# Patient Record
Sex: MALE | Race: WHITE | NOT HISPANIC OR LATINO | Employment: OTHER | URBAN - METROPOLITAN AREA
[De-identification: names, ages, dates, MRNs, and addresses within clinical notes are randomized per-mention and may not be internally consistent; named-entity substitution may affect disease eponyms.]

---

## 2017-02-08 ENCOUNTER — OFFICE VISIT (OUTPATIENT)
Dept: FAMILY MEDICINE CLINIC | Facility: CLINIC | Age: 67
End: 2017-02-08

## 2017-02-08 VITALS
BODY MASS INDEX: 29.4 KG/M2 | HEART RATE: 74 BPM | SYSTOLIC BLOOD PRESSURE: 114 MMHG | TEMPERATURE: 98.6 F | DIASTOLIC BLOOD PRESSURE: 68 MMHG | RESPIRATION RATE: 16 BRPM | WEIGHT: 194 LBS | HEIGHT: 68 IN

## 2017-02-08 DIAGNOSIS — R51.9 HEADACHE, UNSPECIFIED HEADACHE TYPE: Primary | ICD-10-CM

## 2017-02-08 DIAGNOSIS — M51.37 DEGENERATION OF INTERVERTEBRAL DISC OF LUMBOSACRAL REGION: ICD-10-CM

## 2017-02-08 PROCEDURE — 99213 OFFICE O/P EST LOW 20 MIN: CPT | Performed by: FAMILY MEDICINE

## 2017-02-08 RX ORDER — HYDROCODONE BITARTRATE AND ACETAMINOPHEN 5; 325 MG/1; MG/1
TABLET ORAL
Qty: 90 TABLET | Refills: 0 | Status: SHIPPED | OUTPATIENT
Start: 2017-02-08 | End: 2017-05-10 | Stop reason: SDUPTHER

## 2017-02-08 NOTE — PROGRESS NOTES
"Subjective   Rob Shah is a 66 y.o. male.     History of Present Illness     Chief Complaint:   Chief Complaint   Patient presents with   • chronic headache     HOSPITAL FOLLOW UP    • Back Pain     MED REFILL - PAIN MED - J[facility]       Rob Shah 66 y.o. male who presents today for Medical Management of the below listed issues and medication refills.  he has a history of   Patient Active Problem List   Diagnosis   • Degeneration of lumbar intervertebral disc   • ED (erectile dysfunction)   • Hypothyroidism, acquired   • Insomnia   • Nodular prostate without urinary obstruction   • Osteoarthritis   • Hyperlipidemia   • Testicular hypofunction   • Bilateral tinnitus   • Allergic rhinitis due to pollen   • Nausea   .  Since the last visit, he has overall felt well until having to go to the Ed in November for headaches. That visit was as follows:         HPI:  HPI Comments: For the past month, pt has had constant, waxing and waning sharp global headache (starts in frontal head and radiates to vertex and occiput) and dizziness (\"feeling off balance\"). Headache is currently rated at 4/10 and is exacerbated by lying down and improves by pushing on neck. For the last 2 days, he has also had nausea and vomiting (1 episode today). He denies focal weakness, numbness, vision changes, vertigo, neck pain, neck stiffness, fevers, chills, cough, sinus congestion, sore throat, chest pain, and trouble breathing. He reports being referred to ED from  for further evaluation. Pt has no other complaints at this time.      12:58 PM- Rechecked pt. He is resting comfortably and is in no acute distress. Headache has improved after toradol and nausea has resolved after zofran. Pt has had no vomiting in ED. D/w pt and family about all pertinent stable results including normal creatinine and nothing acute found on CT head. Informed pt of plan to prescribe fioricet for headaches (advised pt to not take fioricet while taking his " "hydrocodone prescription). Instructed to f/u with PMD next week for recheck. RTER if sx worsen. Pt understands and agrees with plan. All questions addressed.      His HAs have been gone ever since the ED visit.      he has been compliant with   Current Outpatient Prescriptions:   •  HYDROcodone-acetaminophen (NORCO) 5-325 MG per tablet, Take 1 tablet QD, Disp: 90 tablet, Rfl: 0  •  atorvastatin (LIPITOR) 40 MG tablet, Take 1 tablet by mouth daily., Disp: 90 tablet, Rfl: 3  •  butalbital-acetaminophen-caffeine (FIORICET, ESGIC) -40 MG per tablet, Take 1-2 tablets by mouth Every 6 (Six) Hours As Needed for headaches., Disp: 15 tablet, Rfl: 0  •  fluticasone (FLONASE) 50 MCG/ACT nasal spray, 2 sprays into each nostril daily., Disp: 3 each, Rfl: 3  •  levothyroxine (SYNTHROID) 100 MCG tablet, Take 1 tablet by mouth daily., Disp: 90 tablet, Rfl: 3  •  ondansetron ODT (ZOFRAN-ODT) 4 MG disintegrating tablet, Take 1 tablet by mouth every 8 (eight) hours as needed for nausea or vomiting., Disp: 30 tablet, Rfl: 2  •  traZODone (DESYREL) 150 MG tablet, Take 0.5-2 tabs qhs prn sleep (Patient taking differently: Take 150 mg by mouth Every Night. Take 0.5-2 tabs qhs prn sleep), Disp: 60 tablet, Rfl: 5.  he denies medication side effects.    All of the chronic condition(s) listed above are stable w/o issues.    Visit Vitals   • /68   • Pulse 74   • Temp 98.6 °F (37 °C) (Oral)   • Resp 16   • Ht 68\" (172.7 cm)   • Wt 194 lb (88 kg)   • BMI 29.5 kg/m2       Results for orders placed or performed during the hospital encounter of 12/08/16   POCT rapid strep A   Result Value Ref Range    Rapid Strep A Screen Negative Negative, VALID, INVALID, Not Performed    Internal Control Passed Passed    Lot Number [id number]     Expiration Date 10/2018          The following portions of the patient's history were reviewed and updated as appropriate: allergies, current medications, past family history, past medical history, past social " history, past surgical history and problem list.    Review of Systems   Constitutional: Negative for activity change, chills, fatigue and fever.   Respiratory: Negative for cough and chest tightness.    Cardiovascular: Negative for chest pain and palpitations.   Gastrointestinal: Negative for abdominal pain and nausea.   Endocrine: Negative for cold intolerance and polydipsia.   Neurological: Negative for headaches.   Psychiatric/Behavioral: Negative for behavioral problems and dysphoric mood.   All other systems reviewed and are negative.      Objective   Physical Exam   Constitutional: He appears well-developed and well-nourished.   Neck: Neck supple. No thyromegaly present.   Cardiovascular: Normal rate and regular rhythm.    No murmur heard.  Pulmonary/Chest: Effort normal and breath sounds normal.   Abdominal: Bowel sounds are normal.   Psychiatric: He has a normal mood and affect. His behavior is normal.   Nursing note and vitals reviewed.  Hospital records reviewed with pt confirming HPI.      Assessment/Plan   Rob was seen today for chronic headache and back pain.    Diagnoses and all orders for this visit:    Headache, unspecified headache type    Degeneration of intervertebral disc of lumbosacral region  -     HYDROcodone-acetaminophen (NORCO) 5-325 MG per tablet; Take 1 tablet QD      Headaches resolved currently. Will monitor and do further neck w/u (source) if returns.

## 2017-05-04 DIAGNOSIS — E03.9 HYPOTHYROIDISM, ACQUIRED: ICD-10-CM

## 2017-05-04 DIAGNOSIS — E78.5 HYPERLIPIDEMIA, UNSPECIFIED HYPERLIPIDEMIA TYPE: Primary | ICD-10-CM

## 2017-05-04 LAB
ALBUMIN SERPL-MCNC: 4.4 G/DL (ref 3.5–5.2)
ALBUMIN/GLOB SERPL: 1.6 G/DL
ALP SERPL-CCNC: 91 U/L (ref 39–117)
ALT SERPL-CCNC: 29 U/L (ref 1–41)
AST SERPL-CCNC: 29 U/L (ref 1–40)
BASOPHILS # BLD AUTO: 0.03 10*3/MM3 (ref 0–0.2)
BASOPHILS NFR BLD AUTO: 0.5 % (ref 0–1.5)
BILIRUB SERPL-MCNC: 0.3 MG/DL (ref 0.1–1.2)
BUN SERPL-MCNC: 14 MG/DL (ref 8–23)
BUN/CREAT SERPL: 11.4 (ref 7–25)
CALCIUM SERPL-MCNC: 9.9 MG/DL (ref 8.6–10.5)
CHLORIDE SERPL-SCNC: 104 MMOL/L (ref 98–107)
CHOLEST SERPL-MCNC: 165 MG/DL (ref 0–200)
CO2 SERPL-SCNC: 27.8 MMOL/L (ref 22–29)
CREAT SERPL-MCNC: 1.23 MG/DL (ref 0.76–1.27)
EOSINOPHIL # BLD AUTO: 0.18 10*3/MM3 (ref 0–0.7)
EOSINOPHIL NFR BLD AUTO: 3.1 % (ref 0.3–6.2)
ERYTHROCYTE [DISTWIDTH] IN BLOOD BY AUTOMATED COUNT: 13.2 % (ref 11.5–14.5)
GLOBULIN SER CALC-MCNC: 2.8 GM/DL
GLUCOSE SERPL-MCNC: 85 MG/DL (ref 65–99)
HCT VFR BLD AUTO: 43.6 % (ref 40.4–52.2)
HDLC SERPL-MCNC: 62 MG/DL (ref 40–60)
HGB BLD-MCNC: 14.3 G/DL (ref 13.7–17.6)
IMM GRANULOCYTES # BLD: 0 10*3/MM3 (ref 0–0.03)
IMM GRANULOCYTES NFR BLD: 0 % (ref 0–0.5)
LDLC SERPL CALC-MCNC: 78 MG/DL (ref 0–100)
LYMPHOCYTES # BLD AUTO: 1.94 10*3/MM3 (ref 0.9–4.8)
LYMPHOCYTES NFR BLD AUTO: 32.9 % (ref 19.6–45.3)
MCH RBC QN AUTO: 31.2 PG (ref 27–32.7)
MCHC RBC AUTO-ENTMCNC: 32.8 G/DL (ref 32.6–36.4)
MCV RBC AUTO: 95.2 FL (ref 79.8–96.2)
MONOCYTES # BLD AUTO: 0.6 10*3/MM3 (ref 0.2–1.2)
MONOCYTES NFR BLD AUTO: 10.2 % (ref 5–12)
NEUTROPHILS # BLD AUTO: 3.15 10*3/MM3 (ref 1.9–8.1)
NEUTROPHILS NFR BLD AUTO: 53.3 % (ref 42.7–76)
PLATELET # BLD AUTO: 262 10*3/MM3 (ref 140–500)
POTASSIUM SERPL-SCNC: 4.9 MMOL/L (ref 3.5–5.2)
PROT SERPL-MCNC: 7.2 G/DL (ref 6–8.5)
RBC # BLD AUTO: 4.58 10*6/MM3 (ref 4.6–6)
SODIUM SERPL-SCNC: 144 MMOL/L (ref 136–145)
T4 FREE SERPL-MCNC: 1.51 NG/DL (ref 0.93–1.7)
TRIGL SERPL-MCNC: 125 MG/DL (ref 0–150)
TSH SERPL DL<=0.005 MIU/L-ACNC: 4.06 MIU/ML (ref 0.27–4.2)
VLDLC SERPL CALC-MCNC: 25 MG/DL (ref 5–40)
WBC # BLD AUTO: 5.9 10*3/MM3 (ref 4.5–10.7)

## 2017-05-10 ENCOUNTER — OFFICE VISIT (OUTPATIENT)
Dept: FAMILY MEDICINE CLINIC | Facility: CLINIC | Age: 67
End: 2017-05-10

## 2017-05-10 VITALS
BODY MASS INDEX: 29.25 KG/M2 | WEIGHT: 193 LBS | TEMPERATURE: 98.2 F | HEART RATE: 66 BPM | DIASTOLIC BLOOD PRESSURE: 75 MMHG | HEIGHT: 68 IN | RESPIRATION RATE: 16 BRPM | SYSTOLIC BLOOD PRESSURE: 122 MMHG

## 2017-05-10 DIAGNOSIS — J30.1 NON-SEASONAL ALLERGIC RHINITIS DUE TO POLLEN: ICD-10-CM

## 2017-05-10 DIAGNOSIS — E78.2 MIXED HYPERLIPIDEMIA: ICD-10-CM

## 2017-05-10 DIAGNOSIS — F51.01 PRIMARY INSOMNIA: ICD-10-CM

## 2017-05-10 DIAGNOSIS — M51.37 DEGENERATION OF INTERVERTEBRAL DISC OF LUMBOSACRAL REGION: ICD-10-CM

## 2017-05-10 DIAGNOSIS — Z00.00 ANNUAL PHYSICAL EXAM: Primary | ICD-10-CM

## 2017-05-10 DIAGNOSIS — E03.9 HYPOTHYROIDISM, ACQUIRED: ICD-10-CM

## 2017-05-10 PROCEDURE — G0438 PPPS, INITIAL VISIT: HCPCS | Performed by: FAMILY MEDICINE

## 2017-05-10 PROCEDURE — 96160 PT-FOCUSED HLTH RISK ASSMT: CPT | Performed by: FAMILY MEDICINE

## 2017-05-10 PROCEDURE — 99214 OFFICE O/P EST MOD 30 MIN: CPT | Performed by: FAMILY MEDICINE

## 2017-05-10 RX ORDER — ATORVASTATIN CALCIUM 40 MG/1
40 TABLET, FILM COATED ORAL DAILY
Qty: 90 TABLET | Refills: 3 | Status: SHIPPED | OUTPATIENT
Start: 2017-05-10 | End: 2018-04-30 | Stop reason: SDUPTHER

## 2017-05-10 RX ORDER — LEVOTHYROXINE SODIUM 0.1 MG/1
100 TABLET ORAL DAILY
Qty: 90 TABLET | Refills: 3 | Status: SHIPPED | OUTPATIENT
Start: 2017-05-10 | End: 2018-04-30 | Stop reason: SDUPTHER

## 2017-05-10 RX ORDER — HYDROCODONE BITARTRATE AND ACETAMINOPHEN 5; 325 MG/1; MG/1
TABLET ORAL
Qty: 90 TABLET | Refills: 0 | Status: SHIPPED | OUTPATIENT
Start: 2017-05-10 | End: 2017-08-15 | Stop reason: SDUPTHER

## 2017-05-10 RX ORDER — FLUTICASONE PROPIONATE 50 MCG
2 SPRAY, SUSPENSION (ML) NASAL DAILY
Qty: 3 EACH | Refills: 3 | Status: SHIPPED | OUTPATIENT
Start: 2017-05-10 | End: 2018-04-30 | Stop reason: SDUPTHER

## 2017-05-10 RX ORDER — TRAZODONE HYDROCHLORIDE 150 MG/1
TABLET ORAL
Qty: 180 TABLET | Refills: 1 | Status: SHIPPED | OUTPATIENT
Start: 2017-05-10 | End: 2018-05-01

## 2017-08-15 ENCOUNTER — OFFICE VISIT (OUTPATIENT)
Dept: FAMILY MEDICINE CLINIC | Facility: CLINIC | Age: 67
End: 2017-08-15

## 2017-08-15 VITALS
HEIGHT: 68 IN | HEART RATE: 64 BPM | WEIGHT: 198 LBS | SYSTOLIC BLOOD PRESSURE: 123 MMHG | RESPIRATION RATE: 16 BRPM | BODY MASS INDEX: 30.01 KG/M2 | DIASTOLIC BLOOD PRESSURE: 79 MMHG | TEMPERATURE: 98.6 F

## 2017-08-15 DIAGNOSIS — M54.2 CHRONIC NECK PAIN: Primary | ICD-10-CM

## 2017-08-15 DIAGNOSIS — G89.29 CHRONIC NECK PAIN: Primary | ICD-10-CM

## 2017-08-15 DIAGNOSIS — M51.37 DEGENERATION OF INTERVERTEBRAL DISC OF LUMBOSACRAL REGION: ICD-10-CM

## 2017-08-15 PROCEDURE — 99214 OFFICE O/P EST MOD 30 MIN: CPT | Performed by: FAMILY MEDICINE

## 2017-08-15 PROCEDURE — 72050 X-RAY EXAM NECK SPINE 4/5VWS: CPT | Performed by: FAMILY MEDICINE

## 2017-08-15 RX ORDER — DIAZEPAM 5 MG/1
TABLET ORAL
Qty: 2 TABLET | Refills: 0 | Status: SHIPPED | OUTPATIENT
Start: 2017-08-15 | End: 2017-09-12

## 2017-08-15 RX ORDER — HYDROCODONE BITARTRATE AND ACETAMINOPHEN 5; 325 MG/1; MG/1
TABLET ORAL
Qty: 90 TABLET | Refills: 0 | Status: SHIPPED | OUTPATIENT
Start: 2017-08-15 | End: 2018-05-01 | Stop reason: SDUPTHER

## 2017-08-15 NOTE — PROGRESS NOTES
"Subjective   Rob Shah is a 66 y.o. male.     History of Present Illness     Chief Complaint:   Chief Complaint   Patient presents with   • Pain     neck pain and med refill       Rob Shah 66 y.o. male who presents today for Medical Management of the below listed issues and medication refills.  he has a problem list of   Patient Active Problem List   Diagnosis   • Degeneration of lumbar intervertebral disc   • ED (erectile dysfunction)   • Hypothyroidism, acquired   • Insomnia   • Nodular prostate without urinary obstruction   • Osteoarthritis   • Hyperlipidemia   • Testicular hypofunction   • Bilateral tinnitus   • Allergic rhinitis due to pollen   • Nausea   .  Since the last visit, he has had progression of chronic neck pain (even on the narcotic for his low back) over the past 3-4 months, even to the point that he has lost ROM, and \"grinds\" a lot.  he has been compliant with   Current Outpatient Prescriptions:   •  HYDROcodone-acetaminophen (NORCO) 5-325 MG per tablet, Take 1 tablet QD, Disp: 90 tablet, Rfl: 0  •  Acetaminophen (TYLENOL ARTHRITIS PAIN PO), Take 4 tablet/day by mouth Daily., Disp: , Rfl:   •  atorvastatin (LIPITOR) 40 MG tablet, Take 1 tablet by mouth Daily., Disp: 90 tablet, Rfl: 3  •  butalbital-acetaminophen-caffeine (FIORICET, ESGIC) -40 MG per tablet, Take 1-2 tablets by mouth Every 6 (Six) Hours As Needed for headaches., Disp: 15 tablet, Rfl: 0  •  diazePAM (VALIUM) 5 MG tablet, Take 1 tab prior to the MRI and may repeat x 1 prn, Disp: 2 tablet, Rfl: 0  •  fluticasone (FLONASE) 50 MCG/ACT nasal spray, 2 sprays into each nostril Daily., Disp: 3 each, Rfl: 3  •  levothyroxine (SYNTHROID) 100 MCG tablet, Take 1 tablet by mouth Daily., Disp: 90 tablet, Rfl: 3  •  ondansetron ODT (ZOFRAN-ODT) 4 MG disintegrating tablet, Take 1 tablet by mouth every 8 (eight) hours as needed for nausea or vomiting., Disp: 30 tablet, Rfl: 2  •  traZODone (DESYREL) 150 MG tablet, Take 0.5-2 tabs qhs prn " "sleep, Disp: 180 tablet, Rfl: 1.  he denies medication side effects.    Pt IS very claustrophobic regarding MRIs.    All of the chronic condition(s) listed above are stable w/o issues.    /79  Pulse 64  Temp 98.6 °F (37 °C) (Oral)   Resp 16  Ht 68\" (172.7 cm)  Wt 198 lb (89.8 kg)  BMI 30.11 kg/m2    Results for orders placed or performed in visit on 05/04/17   Comprehensive metabolic panel   Result Value Ref Range    Glucose 85 65 - 99 mg/dL    BUN 14 8 - 23 mg/dL    Creatinine 1.23 0.76 - 1.27 mg/dL    eGFR Non African Am 59 (L) >60 mL/min/1.73    eGFR African Am 71 >60 mL/min/1.73    BUN/Creatinine Ratio 11.4 7.0 - 25.0    Sodium 144 136 - 145 mmol/L    Potassium 4.9 3.5 - 5.2 mmol/L    Chloride 104 98 - 107 mmol/L    Total CO2 27.8 22.0 - 29.0 mmol/L    Calcium 9.9 8.6 - 10.5 mg/dL    Total Protein 7.2 6.0 - 8.5 g/dL    Albumin 4.40 3.50 - 5.20 g/dL    Globulin 2.8 gm/dL    A/G Ratio 1.6 g/dL    Total Bilirubin 0.3 0.1 - 1.2 mg/dL    Alkaline Phosphatase 91 39 - 117 U/L    AST (SGOT) 29 1 - 40 U/L    ALT (SGPT) 29 1 - 41 U/L   Lipid panel   Result Value Ref Range    Total Cholesterol 165 0 - 200 mg/dL    Triglycerides 125 0 - 150 mg/dL    HDL Cholesterol 62 (H) 40 - 60 mg/dL    VLDL Cholesterol 25 5 - 40 mg/dL    LDL Cholesterol  78 0 - 100 mg/dL   TSH   Result Value Ref Range    TSH 4.060 0.270 - 4.200 mIU/mL   T4, Free   Result Value Ref Range    Free T4 1.51 0.93 - 1.70 ng/dL   CBC and Differential   Result Value Ref Range    WBC 5.90 4.50 - 10.70 10*3/mm3    RBC 4.58 (L) 4.60 - 6.00 10*6/mm3    Hemoglobin 14.3 13.7 - 17.6 g/dL    Hematocrit 43.6 40.4 - 52.2 %    MCV 95.2 79.8 - 96.2 fL    MCH 31.2 27.0 - 32.7 pg    MCHC 32.8 32.6 - 36.4 g/dL    RDW 13.2 11.5 - 14.5 %    Platelets 262 140 - 500 10*3/mm3    Neutrophil Rel % 53.3 42.7 - 76.0 %    Lymphocyte Rel % 32.9 19.6 - 45.3 %    Monocyte Rel % 10.2 5.0 - 12.0 %    Eosinophil Rel % 3.1 0.3 - 6.2 %    Basophil Rel % 0.5 0.0 - 1.5 %    Neutrophils " Absolute 3.15 1.90 - 8.10 10*3/mm3    Lymphocytes Absolute 1.94 0.90 - 4.80 10*3/mm3    Monocytes Absolute 0.60 0.20 - 1.20 10*3/mm3    Eosinophils Absolute 0.18 0.00 - 0.70 10*3/mm3    Basophils Absolute 0.03 0.00 - 0.20 10*3/mm3    Immature Granulocyte Rel % 0.0 0.0 - 0.5 %    Immature Grans Absolute 0.00 0.00 - 0.03 10*3/mm3         The following portions of the patient's history were reviewed and updated as appropriate: allergies, current medications, past family history, past medical history, past social history, past surgical history and problem list.    Review of Systems   Constitutional: Negative for activity change, chills, fatigue and fever.   Respiratory: Negative for cough and shortness of breath.    Cardiovascular: Negative for chest pain and palpitations.   Gastrointestinal: Negative for abdominal pain.   Endocrine: Negative for cold intolerance.   Musculoskeletal: Positive for neck pain.   Neurological: Negative for weakness and numbness.   Psychiatric/Behavioral: Negative for behavioral problems and dysphoric mood. The patient is not nervous/anxious.        Objective   Physical Exam   Constitutional: He appears well-developed and well-nourished.   Neck: Neck supple. No thyromegaly present.   Cardiovascular: Normal rate and regular rhythm.    No murmur heard.  Pulmonary/Chest: Effort normal and breath sounds normal.   Abdominal: Bowel sounds are normal.   Musculoskeletal: He exhibits tenderness (cervical midline).   Psychiatric: He has a normal mood and affect. His behavior is normal.   Nursing note and vitals reviewed.  XR Neck: ordered due to worsening pain, no comparison, read by me: loss of lordosis and some spurring with slight fusion.      The patient has read and signed the [facility] Controlled Substance Contract.  I will continue to see patient for regular follow up appointments.  They are well controlled on their medication.  JORGE LUIS has been reviewed by me and is updated every 3 months.  The patient is aware of the potential for addiction and dependence.    Assessment/Plan   Rob was seen today for pain.    Diagnoses and all orders for this visit:    Chronic neck pain  -     diazePAM (VALIUM) 5 MG tablet; Take 1 tab prior to the MRI and may repeat x 1 prn  -     XR Spine Cervical Complete 4 or 5 View  -     Ambulatory Referral to Neurosurgery  -     MRI cervical spine wo contrast; Future    Degeneration of intervertebral disc of lumbosacral region  -     HYDROcodone-acetaminophen (NORCO) 5-325 MG per tablet; Take 1 tablet QD

## 2017-08-28 DIAGNOSIS — M54.2 CERVICAL SPINE PAIN: Primary | ICD-10-CM

## 2017-08-29 ENCOUNTER — TELEPHONE (OUTPATIENT)
Dept: FAMILY MEDICINE CLINIC | Facility: CLINIC | Age: 67
End: 2017-08-29

## 2017-09-12 ENCOUNTER — OFFICE VISIT (OUTPATIENT)
Dept: NEUROSURGERY | Facility: CLINIC | Age: 67
End: 2017-09-12

## 2017-09-12 VITALS
WEIGHT: 197 LBS | HEART RATE: 76 BPM | DIASTOLIC BLOOD PRESSURE: 72 MMHG | RESPIRATION RATE: 16 BRPM | BODY MASS INDEX: 29.86 KG/M2 | SYSTOLIC BLOOD PRESSURE: 112 MMHG | HEIGHT: 68 IN

## 2017-09-12 DIAGNOSIS — M25.511 PAIN OF BOTH SHOULDER JOINTS: ICD-10-CM

## 2017-09-12 DIAGNOSIS — M25.512 PAIN OF BOTH SHOULDER JOINTS: ICD-10-CM

## 2017-09-12 DIAGNOSIS — M50.30 DEGENERATIVE DISC DISEASE, CERVICAL: ICD-10-CM

## 2017-09-12 DIAGNOSIS — M54.2 NECK PAIN: Primary | ICD-10-CM

## 2017-09-12 PROCEDURE — 99203 OFFICE O/P NEW LOW 30 MIN: CPT | Performed by: NURSE PRACTITIONER

## 2017-09-12 NOTE — PROGRESS NOTES
Subjective   Patient ID: Rob Shah is a 66 y.o. male is being seen for consultation today at the request of Rob Travis MD for neck and shoulder pain. Patient presents accompanied by his wife.     History of Present Illness   Patient presents for evaluation and treatment of neck pain. It has been present for more than one year. There is a constant aching 1-2/10 from base of skull to base of neck.  It is aggravated by head movement especially rotation.  This causes a spike of a stabbing pain 8-9 out of 10.  This pain will go from occipital down into the neck and radiated into the shoulders right greater than left.  There is no associated numbness or tingling or weakness of the arms.  He was previously on meloxicam for arthritis and his symptoms seem to present after discontinuation of this due to renal disease.    The following portions of the patient's history were reviewed and updated as appropriate: allergies, current medications, past family history, past medical history, past social history, past surgical history and problem list.    Review of Systems   Constitutional: Negative for chills and fever.   HENT: Positive for tinnitus (bilateral). Negative for sore throat and trouble swallowing.    Eyes: Negative for visual disturbance.   Respiratory: Negative for cough, shortness of breath and wheezing.    Cardiovascular: Negative for chest pain and palpitations.   Gastrointestinal: Negative for abdominal pain, nausea and vomiting.   Genitourinary: Negative for difficulty urinating and enuresis.   Musculoskeletal: Positive for neck pain (bilateral shoulders) and neck stiffness.   Skin: Negative for rash.   Neurological: Negative for weakness and numbness.   Psychiatric/Behavioral: Positive for sleep disturbance.       Objective   Physical Exam   Constitutional: He is oriented to person, place, and time. He appears well-developed and well-nourished.   Cardiovascular: Intact distal pulses.    Pulmonary/Chest:  Effort normal.   Musculoskeletal:        Right shoulder: He exhibits tenderness (mild right anterior- bicepital notch). He exhibits normal range of motion and no pain.        Cervical back: He exhibits decreased range of motion (very limited.) and tenderness (paraspinous; upper trapezius- right >left.). Pain: negative axial load, spurling.   Neurological: He is alert and oriented to person, place, and time. He has normal strength. He has a normal Romberg Test and a normal Tandem Gait Test. Gait normal.   Reflex Scores:       Tricep reflexes are 2+ on the right side and 2+ on the left side.       Bicep reflexes are 2+ on the right side and 2+ on the left side.       Brachioradialis reflexes are 2+ on the right side and 2+ on the left side.       Patellar reflexes are 2+ on the right side and 2+ on the left side.       Achilles reflexes are 2+ on the right side and 2+ on the left side.  Vitals reviewed.    Neurologic Exam     Mental Status   Oriented to person, place, and time.   Level of consciousness: alert    Motor Exam   Muscle bulk: normal  Overall muscle tone: normal    Strength   Strength 5/5 throughout.     Sensory Exam   Right arm light touch: normal  Left arm light touch: normal  Right arm vibration: normal  Left arm vibration: normal  Right arm pinprick: normal  Left arm pinprick: normal       Temperature intact to cold     Gait, Coordination, and Reflexes     Gait  Gait: normal    Coordination   Romberg: negative  Tandem walking coordination: normal    Reflexes   Right brachioradialis: 2+  Left brachioradialis: 2+  Right biceps: 2+  Left biceps: 2+  Right triceps: 2+  Left triceps: 2+  Right patellar: 2+  Left patellar: 2+  Right achilles: 2+  Left achilles: 2+  Right Gallardo: absent  Left Gallardo: absent  Right ankle clonus: absent  Left ankle clonus: absent      Assessment/Plan   Independent Review of Radiographic Studies:    MRI of the cervical spine from [facility] an open MRI dated August 24, 2017 was  reviewed.  There is degenerative disc disease at multiple levels causing broad-based disc osteophytes.  There is no significant central stenosis.  At C3/4 there is moderate to severe left foraminal narrowing secondary to broad-based disc osteophyte as well as facet hypertrophy.  At C6/7 there is a left-sided broad-based disc osteophyte with mild to moderate left neural foraminal narrowing.  At C7/T1 there is right neural foraminal fluid collection consistent with perineural cyst.  There is slight C2 on 3 anterolisthesis.    Medical Decision Making:    Patient presents for evaluation of neck pain as well as bilateral right greater than left shoulder pain.  Has been present for about a year, but it constantly bothers him especially with any head rotation.  He has no radicular complaints.    His exam is as noted above with no neurologic red flags.  He has reduced range of motion of the neck and discomfort with palpation of the paraspinous muscles as well as upper trapezius right greater than left.  There is no weakness or sensory changes.  His MRI is as noted above with degenerative disc disease but no significant cervical stenosis.  There is some neural foraminal stenosis more so on the left than the right.  There is a C7/T1 right neural foraminal fluid collection but this is well below where the level of the shoulder would enervate.    I recommended conservative measures including some physical therapy.  He has had to stop NSAIDs secondary to renal disease.  This may be the provoking factors of his neck discomfort.  I recommended Tylenol arthritis 1 tablet twice daily as well as 1 tablet along with his low dose hydrocodone managed by his PCP at bedtime or he could take 2 Tylenol arthritis at bedtime.  Also recommended heat and massage as well as physical therapy.  We will see him back in about 2 months to see if he has improved.  He will have cervical x-rays including flexion-extension for his follow-up  visit.    Plan: Physical therapy twice weekly for 4-6 weeks.  Cervical x-rays including flexion-extension.  Return to office 2 months.  Rob was seen today for neck pain.    Diagnoses and all orders for this visit:    Neck pain  -     Ambulatory Referral to Physical Therapy Evaluate and treat  -     XR spine cervical complete w flex ext; Future    Pain of both shoulder joints  -     Ambulatory Referral to Physical Therapy Evaluate and treat  -     XR spine cervical complete w flex ext; Future    Degenerative disc disease, cervical  -     Ambulatory Referral to Physical Therapy Evaluate and treat  -     XR spine cervical complete w flex ext; Future      Return in about 2 months (around 11/12/2017) for with imaging, Follow-up with NP.

## 2017-09-21 ENCOUNTER — TELEPHONE (OUTPATIENT)
Dept: FAMILY MEDICINE CLINIC | Facility: CLINIC | Age: 67
End: 2017-09-21

## 2017-09-21 DIAGNOSIS — M51.36 DEGENERATION OF LUMBAR INTERVERTEBRAL DISC: ICD-10-CM

## 2017-09-21 DIAGNOSIS — M50.30 DEGENERATIVE DISC DISEASE, CERVICAL: Primary | ICD-10-CM

## 2017-11-07 ENCOUNTER — TELEPHONE (OUTPATIENT)
Dept: NEUROSURGERY | Facility: CLINIC | Age: 67
End: 2017-11-07

## 2017-11-07 NOTE — TELEPHONE ENCOUNTER
LVM - need to know where he went to PT (did not go to "[facility] [place]). Please get phone number/facilty.

## 2017-11-08 NOTE — TELEPHONE ENCOUNTER
Patient called today to let us know that he did not go to PT because he said that he was feeling better and did not think that he needed it. He wants to know if that will effect his appointment for next Wed 11/15.  He said that he will get his x ray done before his appt.

## 2017-11-08 NOTE — TELEPHONE ENCOUNTER
This will not affect his appointment since he is feeling better.  I will review x-rays with him and as long as they're stable, likely have him follow up PRN

## 2017-11-15 ENCOUNTER — HOSPITAL ENCOUNTER (OUTPATIENT)
Dept: GENERAL RADIOLOGY | Facility: HOSPITAL | Age: 67
Discharge: HOME | End: 2017-11-15
Admitting: NURSE PRACTITIONER

## 2017-11-15 ENCOUNTER — OFFICE VISIT (OUTPATIENT)
Dept: NEUROSURGERY | Facility: CLINIC | Age: 67
End: 2017-11-15

## 2017-11-15 VITALS
RESPIRATION RATE: 16 BRPM | DIASTOLIC BLOOD PRESSURE: 76 MMHG | SYSTOLIC BLOOD PRESSURE: 110 MMHG | WEIGHT: 202 LBS | BODY MASS INDEX: 30.62 KG/M2 | HEIGHT: 68 IN | HEART RATE: 72 BPM

## 2017-11-15 DIAGNOSIS — M54.2 NECK PAIN: ICD-10-CM

## 2017-11-15 DIAGNOSIS — M25.512 PAIN OF BOTH SHOULDER JOINTS: ICD-10-CM

## 2017-11-15 DIAGNOSIS — M54.2 NECK PAIN, CHRONIC: Primary | ICD-10-CM

## 2017-11-15 DIAGNOSIS — M25.511 PAIN OF BOTH SHOULDER JOINTS: ICD-10-CM

## 2017-11-15 DIAGNOSIS — G89.29 NECK PAIN, CHRONIC: Primary | ICD-10-CM

## 2017-11-15 DIAGNOSIS — M50.30 DEGENERATIVE DISC DISEASE, CERVICAL: ICD-10-CM

## 2017-11-15 PROCEDURE — 99213 OFFICE O/P EST LOW 20 MIN: CPT | Performed by: NURSE PRACTITIONER

## 2017-11-15 PROCEDURE — 72052 X-RAY EXAM NECK SPINE 6/>VWS: CPT

## 2017-11-15 RX ORDER — BACLOFEN 10 MG/1
10 TABLET ORAL 3 TIMES DAILY
Qty: 90 TABLET | Refills: 0 | Status: SHIPPED | OUTPATIENT
Start: 2017-11-15 | End: 2017-12-12 | Stop reason: SDUPTHER

## 2017-11-15 NOTE — PROGRESS NOTES
"Subjective   Patient ID: Rob Shah is a 67 y.o. male is here today for follow-up neck pain. Patient presents unaccompanied.     History of Present Illness    He returns to the office today for ongoing follow-up of neck pain.  He was last here in September for consultation of neck and shoulder pain.  He reported pain that radiated from the bottom of his head down into the neck and radiated to both shoulders, right greater than left.  He denied any specific arm pain, as well as numbness, tingling and weakness.  Patient had been present for approximately one year.    He was referred to physical therapy, but did not go. He has undergone repeat cervical x-ray imaging prior to today's visit.    He reports that the posterior neck pain persists in that it remains constant.  He has a hard time turning his head from side to side secondary to muscle tightness.  Pain continues to be worse on the right side.  When the neck pain is very bad it causes headaches and this is when he will take a hydrocodone, typically only one per day.  That is prescribed by his primary care provider.  He has not tried muscle relaxers and remains off NSAIDs secondary to kidney issues.  He also continues to deny any radiating pain, numbness, tingling or weakness in either arm.    He presents unaccompanied.    /76 (BP Location: Right arm, Patient Position: Sitting)  Pulse 72  Resp 16  Ht 68\" (172.7 cm)  Wt 202 lb (91.6 kg)  BMI 30.71 kg/m2      The following portions of the patient's history were reviewed and updated as appropriate: allergies, current medications, past family history, past medical history, past social history, past surgical history and problem list.    Review of Systems   Musculoskeletal: Positive for neck pain and neck stiffness.   Neurological: Negative for weakness and numbness.   Psychiatric/Behavioral: Positive for sleep disturbance (occ'l).       Objective   Physical Exam   Constitutional: He is oriented to person, " place, and time. Vital signs are normal. He appears well-developed and well-nourished. He is cooperative.  Non-toxic appearance. He does not have a sickly appearance. He does not appear ill.   HENT:   Head: Normocephalic and atraumatic.   Neck: Neck supple. No tracheal deviation present.   Pulmonary/Chest: Effort normal and breath sounds normal.   Musculoskeletal: He exhibits tenderness (Bilateral posterior neck tenderness, right greater than left). He exhibits no deformity.        Cervical back: He exhibits decreased range of motion, tenderness and pain. He exhibits no bony tenderness, no swelling, no edema and no spasm.   Decreased cervical range of motion secondary to pain and muscle tightness  Strength equal bilateral upper extremities, normal muscle tone and bulk   Neurological: He is alert and oriented to person, place, and time. He has normal strength and normal reflexes. He displays normal reflexes. No cranial nerve deficit or sensory deficit. Coordination and gait normal. GCS eye subscore is 4. GCS verbal subscore is 5. GCS motor subscore is 6.   Skin: Skin is warm and dry.   Psychiatric: He has a normal mood and affect. His behavior is normal. Judgment and thought content normal.   Vitals reviewed.    Neurologic Exam     Mental Status   Oriented to person, place, and time.     Motor Exam     Strength   Strength 5/5 throughout.       Assessment/Plan   Independent Review of Radiographic Studies:    Cervical flexion and extension x-rays dated November 15, 2017 reveal loss of normal lordosis, bone spurs noted at C4, C5, C6 and C7.  No abnormal motion or instability.    Medical Decision Making:    He returns to the office today for ongoing follow-up of neck pain.  Exam as noted above, no red flags.  He did not go to physical therapy as he was not sure that it would help with his issues.  I stressed about neurosurgical practice that we only do surgery and situations where it is absolutely necessary and once all  conservative measures have been tried.  I also explained that physical therapy would include cervical traction to help stretch out the neck muscles, massage therapy and dry needling.  He is agreeable to try PT for the neck discomfort.  I also prescribed baclofen to help relax the muscles and instructed him to use heat before and after physical therapy to help decrease the inflammation and hopefully help relax the muscles.  I also encouraged him to try home stretching exercises.  We'll plan on seeing him back once trial of physical therapy has been completed.  He is to take the muscle relaxers as directed.    Plan: Return to office after trial of PT, take muscle relaxers as directed  Rob was seen today for neck pain.    Diagnoses and all orders for this visit:    Neck pain, chronic  -     Ambulatory Referral to Physical Therapy Evaluate and treat    Other orders  -     baclofen (LIORESAL) 10 MG tablet; Take 1 tablet by mouth 3 (Three) Times a Day.      Return in about 2 months (around 1/15/2018).

## 2017-12-13 RX ORDER — BACLOFEN 10 MG/1
10 TABLET ORAL 3 TIMES DAILY PRN
Qty: 45 TABLET | Refills: 1 | Status: SHIPPED | OUTPATIENT
Start: 2017-12-13 | End: 2018-05-01 | Stop reason: SDUPTHER

## 2017-12-13 NOTE — TELEPHONE ENCOUNTER
Patient reports only taking on days he does PT (twice a week) - takes one after PT and one at bedtime on those days. He is taking appropriately.

## 2017-12-13 NOTE — TELEPHONE ENCOUNTER
Previous order was for Baclofen TID. I suspect Jerilyn meant TID PRN, but it looks like he is taking it TID. Please tell him he can reduce to as needed (maybe after PT and otherwise as needed). I have changed the rx to reflect PRN.

## 2018-04-17 ENCOUNTER — TELEPHONE (OUTPATIENT)
Dept: FAMILY MEDICINE CLINIC | Facility: CLINIC | Age: 68
End: 2018-04-17

## 2018-04-17 DIAGNOSIS — E78.5 HYPERLIPIDEMIA, UNSPECIFIED HYPERLIPIDEMIA TYPE: Primary | ICD-10-CM

## 2018-04-17 DIAGNOSIS — E03.9 HYPOTHYROIDISM, ACQUIRED: ICD-10-CM

## 2018-04-25 LAB
ALBUMIN SERPL-MCNC: 4.4 G/DL (ref 3.5–5.2)
ALBUMIN/GLOB SERPL: 1.8 G/DL
ALP SERPL-CCNC: 78 U/L (ref 39–117)
ALT SERPL-CCNC: 22 U/L (ref 1–41)
AST SERPL-CCNC: 24 U/L (ref 1–40)
BASOPHILS # BLD AUTO: 0.02 10*3/MM3 (ref 0–0.2)
BASOPHILS NFR BLD AUTO: 0.3 % (ref 0–1.5)
BILIRUB SERPL-MCNC: 0.4 MG/DL (ref 0.1–1.2)
BUN SERPL-MCNC: 13 MG/DL (ref 8–23)
BUN/CREAT SERPL: 11 (ref 7–25)
CALCIUM SERPL-MCNC: 9.6 MG/DL (ref 8.6–10.5)
CHLORIDE SERPL-SCNC: 107 MMOL/L (ref 98–107)
CHOLEST SERPL-MCNC: 148 MG/DL (ref 0–200)
CO2 SERPL-SCNC: 26.8 MMOL/L (ref 22–29)
CREAT SERPL-MCNC: 1.18 MG/DL (ref 0.76–1.27)
EOSINOPHIL # BLD AUTO: 0.19 10*3/MM3 (ref 0–0.7)
EOSINOPHIL NFR BLD AUTO: 2.6 % (ref 0.3–6.2)
ERYTHROCYTE [DISTWIDTH] IN BLOOD BY AUTOMATED COUNT: 12.8 % (ref 11.5–14.5)
GFR SERPLBLD CREATININE-BSD FMLA CKD-EPI: 61 ML/MIN/1.73
GFR SERPLBLD CREATININE-BSD FMLA CKD-EPI: 74 ML/MIN/1.73
GLOBULIN SER CALC-MCNC: 2.5 GM/DL
GLUCOSE SERPL-MCNC: 84 MG/DL (ref 65–99)
HCT VFR BLD AUTO: 45 % (ref 40.4–52.2)
HDLC SERPL-MCNC: 57 MG/DL (ref 40–60)
HGB BLD-MCNC: 14.8 G/DL (ref 13.7–17.6)
IMM GRANULOCYTES # BLD: 0.01 10*3/MM3 (ref 0–0.03)
IMM GRANULOCYTES NFR BLD: 0.1 % (ref 0–0.5)
LDLC SERPL CALC-MCNC: 73 MG/DL (ref 0–100)
LDLC/HDLC SERPL: 1.28 {RATIO}
LYMPHOCYTES # BLD AUTO: 1.94 10*3/MM3 (ref 0.9–4.8)
LYMPHOCYTES NFR BLD AUTO: 26.5 % (ref 19.6–45.3)
MCH RBC QN AUTO: 31.8 PG (ref 27–32.7)
MCHC RBC AUTO-ENTMCNC: 32.9 G/DL (ref 32.6–36.4)
MCV RBC AUTO: 96.8 FL (ref 79.8–96.2)
MONOCYTES # BLD AUTO: 0.71 10*3/MM3 (ref 0.2–1.2)
MONOCYTES NFR BLD AUTO: 9.7 % (ref 5–12)
NEUTROPHILS # BLD AUTO: 4.47 10*3/MM3 (ref 1.9–8.1)
NEUTROPHILS NFR BLD AUTO: 60.9 % (ref 42.7–76)
PLATELET # BLD AUTO: 269 10*3/MM3 (ref 140–500)
POTASSIUM SERPL-SCNC: 4.5 MMOL/L (ref 3.5–5.2)
PROT SERPL-MCNC: 6.9 G/DL (ref 6–8.5)
RBC # BLD AUTO: 4.65 10*6/MM3 (ref 4.6–6)
SODIUM SERPL-SCNC: 144 MMOL/L (ref 136–145)
T4 FREE SERPL-MCNC: 1.49 NG/DL (ref 0.93–1.7)
TRIGL SERPL-MCNC: 91 MG/DL (ref 0–150)
TSH SERPL DL<=0.005 MIU/L-ACNC: 2.15 MIU/ML (ref 0.27–4.2)
VLDLC SERPL CALC-MCNC: 18.2 MG/DL (ref 5–40)
WBC # BLD AUTO: 7.33 10*3/MM3 (ref 4.5–10.7)

## 2018-04-30 RX ORDER — TRAZODONE HYDROCHLORIDE 150 MG/1
TABLET ORAL
Qty: 180 TABLET | Refills: 3 | Status: CANCELLED | OUTPATIENT
Start: 2018-04-30

## 2018-05-01 ENCOUNTER — OFFICE VISIT (OUTPATIENT)
Dept: FAMILY MEDICINE CLINIC | Facility: CLINIC | Age: 68
End: 2018-05-01

## 2018-05-01 VITALS
RESPIRATION RATE: 14 BRPM | BODY MASS INDEX: 30.07 KG/M2 | HEIGHT: 69 IN | HEART RATE: 66 BPM | DIASTOLIC BLOOD PRESSURE: 88 MMHG | WEIGHT: 203 LBS | TEMPERATURE: 97.8 F | SYSTOLIC BLOOD PRESSURE: 123 MMHG

## 2018-05-01 DIAGNOSIS — F51.01 PRIMARY INSOMNIA: ICD-10-CM

## 2018-05-01 DIAGNOSIS — M51.37 DEGENERATION OF INTERVERTEBRAL DISC OF LUMBOSACRAL REGION: ICD-10-CM

## 2018-05-01 DIAGNOSIS — E03.9 HYPOTHYROIDISM, ACQUIRED: ICD-10-CM

## 2018-05-01 DIAGNOSIS — J30.89 CHRONIC NONSEASONAL ALLERGIC RHINITIS DUE TO POLLEN: ICD-10-CM

## 2018-05-01 DIAGNOSIS — R11.0 NAUSEA: ICD-10-CM

## 2018-05-01 DIAGNOSIS — E78.2 MIXED HYPERLIPIDEMIA: ICD-10-CM

## 2018-05-01 PROCEDURE — 99214 OFFICE O/P EST MOD 30 MIN: CPT | Performed by: FAMILY MEDICINE

## 2018-05-01 RX ORDER — FLUTICASONE PROPIONATE 50 MCG
2 SPRAY, SUSPENSION (ML) NASAL DAILY
Qty: 3 BOTTLE | Refills: 3 | Status: SHIPPED | OUTPATIENT
Start: 2018-05-01 | End: 2019-04-02 | Stop reason: SDUPTHER

## 2018-05-01 RX ORDER — ATORVASTATIN CALCIUM 40 MG/1
40 TABLET, FILM COATED ORAL DAILY
Qty: 90 TABLET | Refills: 3 | Status: SHIPPED | OUTPATIENT
Start: 2018-05-01 | End: 2019-04-02 | Stop reason: SDUPTHER

## 2018-05-01 RX ORDER — ONDANSETRON 4 MG/1
4 TABLET, ORALLY DISINTEGRATING ORAL EVERY 8 HOURS PRN
Qty: 30 TABLET | Refills: 2 | Status: SHIPPED | OUTPATIENT
Start: 2018-05-01 | End: 2020-04-16 | Stop reason: SDUPTHER

## 2018-05-01 RX ORDER — LEVOTHYROXINE SODIUM 0.1 MG/1
100 TABLET ORAL DAILY
Qty: 90 TABLET | Refills: 3 | Status: SHIPPED | OUTPATIENT
Start: 2018-05-01 | End: 2019-04-02 | Stop reason: SDUPTHER

## 2018-05-01 RX ORDER — HYDROCODONE BITARTRATE AND ACETAMINOPHEN 5; 325 MG/1; MG/1
TABLET ORAL
Qty: 90 TABLET | Refills: 0 | Status: SHIPPED | OUTPATIENT
Start: 2018-05-01 | End: 2019-04-02 | Stop reason: SDUPTHER

## 2018-05-01 RX ORDER — BACLOFEN 10 MG/1
10 TABLET ORAL
Qty: 90 TABLET | Refills: 1 | Status: SHIPPED | OUTPATIENT
Start: 2018-05-01 | End: 2018-05-01 | Stop reason: SDUPTHER

## 2018-05-01 RX ORDER — BACLOFEN 10 MG/1
10 TABLET ORAL
Qty: 90 TABLET | Refills: 3 | Status: SHIPPED | OUTPATIENT
Start: 2018-05-01 | End: 2019-02-17

## 2018-05-01 NOTE — PROGRESS NOTES
Subjective   Rob Shah is a 67 y.o. male.     History of Present Illness     Chief Complaint:   Chief Complaint   Patient presents with   • Hypothyroidism     med refill  - lab results   • Hyperlipidemia       Rob Shah 67 y.o. male who presents today for Medical Management of the below listed issues and medication refills.  he has a problem list of   Patient Active Problem List   Diagnosis   • Degeneration of lumbar intervertebral disc   • ED (erectile dysfunction)   • Hypothyroidism, acquired   • Insomnia   • Nodular prostate without urinary obstruction   • Osteoarthritis   • Hyperlipidemia   • Testicular hypofunction   • Bilateral tinnitus   • Allergic rhinitis due to pollen   • Nausea   • Neck pain   • Pain of both shoulder joints   • Degenerative disc disease, cervical   .  Since the last visit, he has overall felt well.  he has been compliant with   Current Outpatient Prescriptions:   •  ondansetron ODT (ZOFRAN-ODT) 4 MG disintegrating tablet, Take 1 tablet by mouth Every 8 (Eight) Hours As Needed for Nausea or Vomiting., Disp: 30 tablet, Rfl: 2  •  Acetaminophen (TYLENOL ARTHRITIS PAIN PO), Take 4 tablet/day by mouth Daily., Disp: , Rfl:   •  atorvastatin (LIPITOR) 40 MG tablet, Take 1 tablet by mouth Daily., Disp: 90 tablet, Rfl: 3  •  baclofen (LIORESAL) 10 MG tablet, Take 1 tablet by mouth every night at bedtime., Disp: 90 tablet, Rfl: 1  •  butalbital-acetaminophen-caffeine (FIORICET, ESGIC) -40 MG per tablet, Take 1-2 tablets by mouth Every 6 (Six) Hours As Needed for headaches., Disp: 15 tablet, Rfl: 0  •  fluticasone (FLONASE) 50 MCG/ACT nasal spray, 2 sprays into each nostril Daily., Disp: 3 bottle, Rfl: 3  •  HYDROcodone-acetaminophen (NORCO) 5-325 MG per tablet, Take 1 tablet QD, Disp: 90 tablet, Rfl: 0  •  levothyroxine (SYNTHROID) 100 MCG tablet, Take 1 tablet by mouth Daily., Disp: 90 tablet, Rfl: 3  •  Multiple Vitamins-Minerals (CENTRUM SILVER 50+MEN PO), Take  by mouth., Disp: , Rfl: .  " he denies medication side effects.    All of the chronic condition(s) listed above are stable w/o issues.    /88   Pulse 66   Temp 97.8 °F (36.6 °C) (Oral)   Resp 14   Ht 175.3 cm (69\")   Wt 92.1 kg (203 lb)   BMI 29.98 kg/m²     Results for orders placed or performed in visit on 04/17/18   Comprehensive metabolic panel   Result Value Ref Range    Glucose 84 65 - 99 mg/dL    BUN 13 8 - 23 mg/dL    Creatinine 1.18 0.76 - 1.27 mg/dL    eGFR Non African Am 61 >60 mL/min/1.73    eGFR African Am 74 >60 mL/min/1.73    BUN/Creatinine Ratio 11.0 7.0 - 25.0    Sodium 144 136 - 145 mmol/L    Potassium 4.5 3.5 - 5.2 mmol/L    Chloride 107 98 - 107 mmol/L    Total CO2 26.8 22.0 - 29.0 mmol/L    Calcium 9.6 8.6 - 10.5 mg/dL    Total Protein 6.9 6.0 - 8.5 g/dL    Albumin 4.40 3.50 - 5.20 g/dL    Globulin 2.5 gm/dL    A/G Ratio 1.8 g/dL    Total Bilirubin 0.4 0.1 - 1.2 mg/dL    Alkaline Phosphatase 78 39 - 117 U/L    AST (SGOT) 24 1 - 40 U/L    ALT (SGPT) 22 1 - 41 U/L   Lipid Panel With LDL/HDL Ratio   Result Value Ref Range    Total Cholesterol 148 0 - 200 mg/dL    Triglycerides 91 0 - 150 mg/dL    HDL Cholesterol 57 40 - 60 mg/dL    VLDL Cholesterol 18.2 5 - 40 mg/dL    LDL Cholesterol  73 0 - 100 mg/dL    LDL/HDL Ratio 1.28    TSH   Result Value Ref Range    TSH 2.150 0.270 - 4.200 mIU/mL   T4, Free   Result Value Ref Range    Free T4 1.49 0.93 - 1.70 ng/dL   CBC and Differential   Result Value Ref Range    WBC 7.33 4.50 - 10.70 10*3/mm3    RBC 4.65 4.60 - 6.00 10*6/mm3    Hemoglobin 14.8 13.7 - 17.6 g/dL    Hematocrit 45.0 40.4 - 52.2 %    MCV 96.8 (H) 79.8 - 96.2 fL    MCH 31.8 27.0 - 32.7 pg    MCHC 32.9 32.6 - 36.4 g/dL    RDW 12.8 11.5 - 14.5 %    Platelets 269 140 - 500 10*3/mm3    Neutrophil Rel % 60.9 42.7 - 76.0 %    Lymphocyte Rel % 26.5 19.6 - 45.3 %    Monocyte Rel % 9.7 5.0 - 12.0 %    Eosinophil Rel % 2.6 0.3 - 6.2 %    Basophil Rel % 0.3 0.0 - 1.5 %    Neutrophils Absolute 4.47 1.90 - 8.10 10*3/mm3 "    Lymphocytes Absolute 1.94 0.90 - 4.80 10*3/mm3    Monocytes Absolute 0.71 0.20 - 1.20 10*3/mm3    Eosinophils Absolute 0.19 0.00 - 0.70 10*3/mm3    Basophils Absolute 0.02 0.00 - 0.20 10*3/mm3    Immature Granulocyte Rel % 0.1 0.0 - 0.5 %    Immature Grans Absolute 0.01 0.00 - 0.03 10*3/mm3           The following portions of the patient's history were reviewed and updated as appropriate: allergies, current medications, past family history, past medical history, past social history, past surgical history and problem list.    Review of Systems   Constitutional: Negative for activity change, chills, fatigue and fever.   Respiratory: Negative for cough and shortness of breath.    Cardiovascular: Negative for chest pain and palpitations.   Gastrointestinal: Negative for abdominal pain.   Endocrine: Negative for cold intolerance.   Psychiatric/Behavioral: Negative for behavioral problems and dysphoric mood. The patient is not nervous/anxious.        Objective   Physical Exam   Constitutional: He appears well-developed and well-nourished.   Neck: Neck supple. No thyromegaly present.   Cardiovascular: Normal rate and regular rhythm.    No murmur heard.  Pulmonary/Chest: Effort normal and breath sounds normal.   Abdominal: Bowel sounds are normal.   Psychiatric: He has a normal mood and affect. His behavior is normal.   Nursing note and vitals reviewed.  Labs reviewed with pt today during visit. All questions answered.      Assessment/Plan   Rob was seen today for hypothyroidism and hyperlipidemia.    Diagnoses and all orders for this visit:    Mixed hyperlipidemia  -     atorvastatin (LIPITOR) 40 MG tablet; Take 1 tablet by mouth Daily.    Chronic nonseasonal allergic rhinitis due to pollen  -     fluticasone (FLONASE) 50 MCG/ACT nasal spray; 2 sprays into each nostril Daily.    Hypothyroidism, acquired  -     levothyroxine (SYNTHROID) 100 MCG tablet; Take 1 tablet by mouth Daily.    Primary insomnia    Nausea  -      ondansetron ODT (ZOFRAN-ODT) 4 MG disintegrating tablet; Take 1 tablet by mouth Every 8 (Eight) Hours As Needed for Nausea or Vomiting.    Degeneration of intervertebral disc of lumbosacral region  -     HYDROcodone-acetaminophen (NORCO) 5-325 MG per tablet; Take 1 tablet QD  -     baclofen (LIORESAL) 10 MG tablet; Take 1 tablet by mouth every night at bedtime.    Other orders  -     Cancel: traZODone (DESYREL) 150 MG tablet; Take 0.5-2 tabs qhs prn sleep

## 2019-01-29 ENCOUNTER — OFFICE VISIT (OUTPATIENT)
Dept: FAMILY MEDICINE CLINIC | Facility: CLINIC | Age: 69
End: 2019-01-29

## 2019-01-29 VITALS
HEIGHT: 69 IN | RESPIRATION RATE: 16 BRPM | BODY MASS INDEX: 29.18 KG/M2 | WEIGHT: 197 LBS | SYSTOLIC BLOOD PRESSURE: 125 MMHG | DIASTOLIC BLOOD PRESSURE: 78 MMHG | TEMPERATURE: 98.4 F | HEART RATE: 70 BPM

## 2019-01-29 DIAGNOSIS — S39.012A STRAIN OF LUMBAR REGION, INITIAL ENCOUNTER: Primary | ICD-10-CM

## 2019-01-29 PROCEDURE — 99213 OFFICE O/P EST LOW 20 MIN: CPT | Performed by: FAMILY MEDICINE

## 2019-01-29 RX ORDER — METHYLPREDNISOLONE 4 MG/1
TABLET ORAL
Qty: 21 TABLET | Refills: 0 | Status: SHIPPED | OUTPATIENT
Start: 2019-01-29 | End: 2019-02-17

## 2019-01-29 NOTE — PROGRESS NOTES
"Isabell Shah is a 68 y.o. male.     CC: Low Back Pain    History of Present Illness     Pt comes in today c/o some right lower back pain x 3 weeks w/o injury. Awoke with this and reports the pain is mainly with sitting and arising from a chair. Pain is mainly lower right side and moves to the buttock. No other sx. Tried a topical w/o help.      The following portions of the patient's history were reviewed and updated as appropriate: allergies, current medications, past family history, past medical history, past social history, past surgical history and problem list.    Review of Systems   Constitutional: Negative for activity change, chills, fatigue and fever.   Respiratory: Negative for cough and shortness of breath.    Cardiovascular: Negative for chest pain and palpitations.   Gastrointestinal: Negative for abdominal pain.   Endocrine: Negative for cold intolerance.   Genitourinary: Positive for dysuria.   Musculoskeletal: Positive for back pain.   Neurological: Positive for weakness and headaches. Negative for numbness.   Psychiatric/Behavioral: Negative for behavioral problems and dysphoric mood. The patient is not nervous/anxious.      /78   Pulse 70   Temp 98.4 °F (36.9 °C) (Oral)   Resp 16   Ht 175.3 cm (69\")   Wt 89.4 kg (197 lb)   BMI 29.09 kg/m²     Objective   Physical Exam   Constitutional: He appears well-developed and well-nourished.   Neck: Neck supple. No thyromegaly present.   Cardiovascular: Normal rate and regular rhythm.   No murmur heard.  Pulmonary/Chest: Effort normal and breath sounds normal.   Abdominal: Bowel sounds are normal. There is no tenderness.   Musculoskeletal: He exhibits tenderness (right lower paraspinal muscles and the SI  joint).   Neurological:   Reflex Scores:       Patellar reflexes are 2+ on the right side and 2+ on the left side.       Achilles reflexes are 2+ on the right side and 2+ on the left side.  Negative SLR   Psychiatric: He has a normal " mood and affect. His behavior is normal.   Nursing note and vitals reviewed.      Assessment/Plan   Rob was seen today for right lower back pain.    Diagnoses and all orders for this visit:    Strain of lumbar region, initial encounter  -     MethylPREDNISolone (MEDROL) 4 MG tablet; follow package directions  -     Ambulatory Referral to Physical Therapy Evaluate and treat    Heat/stretching discussed.

## 2019-02-17 ENCOUNTER — HOSPITAL ENCOUNTER (EMERGENCY)
Facility: HOSPITAL | Age: 69
Discharge: HOME | End: 2019-02-17
Attending: EMERGENCY MEDICINE | Admitting: EMERGENCY MEDICINE

## 2019-02-17 VITALS
HEART RATE: 73 BPM | SYSTOLIC BLOOD PRESSURE: 129 MMHG | RESPIRATION RATE: 16 BRPM | WEIGHT: 202 LBS | HEIGHT: 68 IN | BODY MASS INDEX: 30.62 KG/M2 | OXYGEN SATURATION: 96 % | TEMPERATURE: 98.6 F | DIASTOLIC BLOOD PRESSURE: 73 MMHG

## 2019-02-17 DIAGNOSIS — J68.0 CHEMICAL PNEUMONITIS (HCC): Primary | ICD-10-CM

## 2019-02-17 PROCEDURE — 94799 UNLISTED PULMONARY SVC/PX: CPT

## 2019-02-17 PROCEDURE — 94640 AIRWAY INHALATION TREATMENT: CPT

## 2019-02-17 PROCEDURE — 93005 ELECTROCARDIOGRAM TRACING: CPT | Performed by: EMERGENCY MEDICINE

## 2019-02-17 PROCEDURE — 93010 ELECTROCARDIOGRAM REPORT: CPT | Performed by: INTERNAL MEDICINE

## 2019-02-17 PROCEDURE — 99284 EMERGENCY DEPT VISIT MOD MDM: CPT

## 2019-02-17 RX ORDER — ACETAMINOPHEN 325 MG/1
650 TABLET ORAL EVERY 6 HOURS PRN
COMMUNITY
End: 2019-11-14 | Stop reason: HOSPADM

## 2019-02-17 RX ORDER — NEBULIZER
5 EACH MISCELLANEOUS ONCE
Status: DISCONTINUED | OUTPATIENT
Start: 2019-02-17 | End: 2019-02-17 | Stop reason: SDUPTHER

## 2019-02-17 RX ADMIN — Medication 3 ML: at 22:22

## 2019-02-18 NOTE — ED PROVIDER NOTES
EMERGENCY DEPARTMENT ENCOUNTER    Room Number:  22/22  Date seen:  2/17/2019  Time seen: 9:26 PM  PCP: Rob Travis MD  Historian: patient, spouse      HPI:  Chief Complaint: SOA    Context: Rob Shah is a 68 y.o. male who presents to the ED c/o SOA that occurred about 45 minutes ago after he accidentally mixed Lysol and Clorox while cleaning his shower. Pt also complains of a sore throat and dry cough. Pt was given two albuterol breathing treatments by EMS PTA with improvement of his SOA.    Quality: SOA  Intensity/Severity: moderate  Duration: 45 minutes  Onset quality: gradual  Timing: constant  Progression: improved  Aggravating Factors: none  Alleviating Factors: none  Previous Episodes: none  Treatment before arrival: Pt was given two albuterol breathing treatments by EMS PTA with improvement of his SOA.  Associated Symptoms: sore throat, cough    PAST MEDICAL HISTORY  Active Ambulatory Problems     Diagnosis Date Noted   • Degeneration of lumbar intervertebral disc    • ED (erectile dysfunction)    • Hypothyroidism, acquired    • Insomnia    • Nodular prostate without urinary obstruction    • Osteoarthritis    • Hyperlipidemia    • Testicular hypofunction    • Bilateral tinnitus    • Allergic rhinitis due to pollen 12/15/2015   • Nausea 07/06/2016   • Neck pain 09/12/2017   • Pain of both shoulder joints 09/12/2017   • Degenerative disc disease, cervical 09/12/2017     Resolved Ambulatory Problems     Diagnosis Date Noted   • No Resolved Ambulatory Problems     Past Medical History:   Diagnosis Date   • Allergic rhinitis    • Back pain with radiation    • Bilateral tinnitus    • Colon polyps    • Degeneration of intervertebral disc    • ED (erectile dysfunction)    • H/O complete eye exam 2016   • Headache    • History of panic attacks    • Hyperlipidemia    • Hypothyroidism, acquired    • Insomnia    • Kidney failure    • Liver mass 01/18/2016   • Low back pain    • Nodular prostate without urinary  obstruction    • Osteoarthritis    • PONV (postoperative nausea and vomiting)    • Sleep apnea    • Testicular hypofunction          PAST SURGICAL HISTORY  Past Surgical History:   Procedure Laterality Date   • CARDIAC CATHETERIZATION      D/T CP AND ABNML LABS   • COLONOSCOPY  10/18/2011       • COLONOSCOPY N/A 2016    Procedure: COLONOSCOPY;  Surgeon: Tiffany Sandhu MD;  Location: [facility] ENDOSCOPY;  Service:    • HAND SURGERY Right    • HERNIA REPAIR     • LUMBAR EPIDURAL INJECTION     • VASECTOMY           FAMILY HISTORY  Family History   Problem Relation Age of Onset   • Heart failure Mother    • Kidney disease Mother    • Cancer Father         BRAIN   • Colon cancer Paternal Uncle          SOCIAL HISTORY  Social History     Socioeconomic History   • Marital status:      Spouse name: Not on file   • Number of children: 2   • Years of education: 12TH GRADE   • Highest education level: Not on file   Social Needs   • Financial resource strain: Not on file   • Food insecurity - worry: Not on file   • Food insecurity - inability: Not on file   • Transportation needs - medical: Not on file   • Transportation needs - non-medical: Not on file   Occupational History   • Occupation: RETIRED    Tobacco Use   • Smoking status: Former Smoker     Packs/day: 1.00     Years: 30.00     Pack years: 30.00     Types: Cigarettes     Last attempt to quit:      Years since quittin.1   • Smokeless tobacco: Never Used   Substance and Sexual Activity   • Alcohol use: Yes     Comment: rare   • Drug use: No   • Sexual activity: Defer   Other Topics Concern   • Not on file   Social History Narrative   • Not on file         ALLERGIES  Penicillins        REVIEW OF SYSTEMS  Review of Systems   Constitutional: Negative for fever.   HENT: Positive for sore throat.    Respiratory: Positive for cough and shortness of breath.    Cardiovascular: Negative for chest pain.   Gastrointestinal: Negative for  abdominal pain.   Endocrine: Negative for polyuria.   Genitourinary: Negative for dysuria.   Musculoskeletal: Negative for neck pain.   Skin: Negative for rash.   Neurological: Negative for headaches.   All other systems reviewed and are negative.           PHYSICAL EXAM  ED Triage Vitals   Temp Heart Rate Resp BP SpO2   02/17/19 2101 02/17/19 2101 02/17/19 2101 02/17/19 2103 02/17/19 2101   98.6 °F (37 °C) 73 16 142/78 99 %      Temp src Heart Rate Source Patient Position BP Location FiO2 (%)   02/17/19 2101 02/17/19 2101 02/17/19 2116 02/17/19 2116 --   Tympanic Monitor Sitting Right arm          GENERAL: not distressed, speaking in full sentences  HENT: nares patent, uvula midline, no trismus, no posterior oropharyngeal erythema  EYES: no scleral icterus  CV: regular rhythm, regular rate  RESPIRATORY: normal effort, CTAB  ABDOMEN: soft, non-tender  MUSCULOSKELETAL: no deformity  NEURO: alert, GILMORE, FC  SKIN: warm, dry    Vital signs and nursing notes reviewed.      PROCEDURES  Procedures  EKG          EKG time: 2146  Rhythm/Rate: NSR, 80  P waves and NE: normal  QRS, axis: normal   ST and T waves: normal     Interpreted Contemporaneously by me, independently viewed  changed compared to prior on 11/23/16 (rate is faster)      MEDICATIONS GIVEN IN ER  Medications   sodium bicarbonate 5% nebulized solution (3 mL Nebulization Given 2/17/19 2222)       PROGRESS AND CONSULTS     2130- Discussed the plan to call poison control regarding the pt's treatment but that the pt will likely just need to be observed for a short time prior to discharging the pt home. Pt understands and agrees with the plan, all questions answered.    2133- Discussed the pt's case with Blanca Oconnor (poison control), who recommends ordering a bicarb mini-neb that consists of 2cc of normal saline and 3cc of sodium bicarbonate at 8.4%.    2147- Ordered sodium bicarb nebulizer, per poison control.    2227- Rechecked pt. Pt is resting comfortably and  receiving his breathing treatment at this time. O2-99% on RA. Notified pt and family of my discussion with poison control and that they recommended ordering this breathing treatment. Discussed the plan to re-evaluate the pt once his breathing treatment is finished. Pt understands and agrees with the plan, all questions answered.    2249- Rechecked pt. Pt is resting comfortably but states that his symptoms are unchanged after the breathing treatment. On re-exam, the pt's lungs are CTAB. Discussed the plan to discharge the pt home. Pt understands and agrees with the plan, all questions answered.    MEDICAL DECISION MAKING      MDM  Number of Diagnoses or Management Options  Chemical pneumonitis (CMS/HCC):   Diagnosis management comments: Pt feels well. Clear lungs. No need for CXR based on prior literature.        Amount and/or Complexity of Data Reviewed  Tests in the medicine section of CPT®: ordered and reviewed (See EKG procedure note)  Decide to obtain previous medical records or to obtain history from someone other than the patient: yes (Wife states patient looks well. He was using Lysol and bleach. She has no sx. )  Obtain history from someone other than the patient: yes (Spouse)  Discuss the patient with other providers: yes (D/w Blanca Oconnor (poison control))    Patient Progress  Patient progress: stable             DIAGNOSIS  Final diagnoses:   Chemical pneumonitis (CMS/HCC)         DISPOSITION  DISCHARGE    Patient discharged in stable condition.    Reviewed implications of results, diagnosis, meds, responsibility to follow up, warning signs and symptoms of possible worsening, potential complications and reasons to return to ER.    Patient/Family voiced understanding of above instructions.    Discussed plan for discharge, as there is no emergent indication for admission. Patient referred to primary care provider for BP management due to today's BP. Pt/family is agreeable and understands need for follow up  and repeat testing.  Pt is aware that discharge does not mean that nothing is wrong but it indicates no emergency is present that requires admission and they must continue care with follow-up as given below or physician of their choice.     FOLLOW-UP  Rob Travis MD  [id number] [id number] [phone]    Call   As needed, If symptoms worsen         Medication List      No changes were made to your prescriptions during this visit.               Latest Documented Vital Signs:  As of 10:51 PM  BP- 124/70 HR- 79 Temp- 98.6 °F (37 °C) (Tympanic) O2 sat- 93%        --  Documentation assistance provided by mando Spring for Dr. Jai MD.  Information recorded by the scribe was done at my direction and has been verified and validated by me.     Shayla Spring  02/17/19 [id number]       Galdino Vergara II, MD  02/18/19 [id number]

## 2019-03-21 ENCOUNTER — TELEPHONE (OUTPATIENT)
Dept: FAMILY MEDICINE CLINIC | Facility: CLINIC | Age: 69
End: 2019-03-21

## 2019-03-21 DIAGNOSIS — E78.2 MIXED HYPERLIPIDEMIA: Primary | ICD-10-CM

## 2019-03-21 DIAGNOSIS — E03.9 HYPOTHYROIDISM, ACQUIRED: ICD-10-CM

## 2019-03-26 LAB
ALBUMIN SERPL-MCNC: 4.3 G/DL (ref 3.5–5.2)
ALBUMIN/GLOB SERPL: 1.9 G/DL
ALP SERPL-CCNC: 76 U/L (ref 39–117)
ALT SERPL-CCNC: 34 U/L (ref 1–41)
AST SERPL-CCNC: 41 U/L (ref 1–40)
BASOPHILS # BLD AUTO: 0.05 10*3/MM3 (ref 0–0.2)
BASOPHILS NFR BLD AUTO: 0.8 % (ref 0–1.5)
BILIRUB SERPL-MCNC: 0.5 MG/DL (ref 0.2–1.2)
BUN SERPL-MCNC: 10 MG/DL (ref 8–23)
BUN/CREAT SERPL: 8.5 (ref 7–25)
CALCIUM SERPL-MCNC: 9.1 MG/DL (ref 8.6–10.5)
CHLORIDE SERPL-SCNC: 105 MMOL/L (ref 98–107)
CHOLEST SERPL-MCNC: 136 MG/DL (ref 0–200)
CO2 SERPL-SCNC: 27.2 MMOL/L (ref 22–29)
CREAT SERPL-MCNC: 1.18 MG/DL (ref 0.76–1.27)
EOSINOPHIL # BLD AUTO: 0.12 10*3/MM3 (ref 0–0.4)
EOSINOPHIL NFR BLD AUTO: 1.8 % (ref 0.3–6.2)
ERYTHROCYTE [DISTWIDTH] IN BLOOD BY AUTOMATED COUNT: 12.1 % (ref 12.3–15.4)
GLOBULIN SER CALC-MCNC: 2.3 GM/DL
GLUCOSE SERPL-MCNC: 95 MG/DL (ref 65–99)
HCT VFR BLD AUTO: 42.8 % (ref 37.5–51)
HDLC SERPL-MCNC: 58 MG/DL (ref 40–60)
HGB BLD-MCNC: 13.6 G/DL (ref 13–17.7)
IMM GRANULOCYTES # BLD AUTO: 0.02 10*3/MM3 (ref 0–0.05)
IMM GRANULOCYTES NFR BLD AUTO: 0.3 % (ref 0–0.5)
LDLC SERPL CALC-MCNC: 58 MG/DL (ref 0–100)
LDLC/HDLC SERPL: 1 {RATIO}
LYMPHOCYTES # BLD AUTO: 1.48 10*3/MM3 (ref 0.7–3.1)
LYMPHOCYTES NFR BLD AUTO: 22.7 % (ref 19.6–45.3)
MCH RBC QN AUTO: 30.7 PG (ref 26.6–33)
MCHC RBC AUTO-ENTMCNC: 31.8 G/DL (ref 31.5–35.7)
MCV RBC AUTO: 96.6 FL (ref 79–97)
MONOCYTES # BLD AUTO: 0.78 10*3/MM3 (ref 0.1–0.9)
MONOCYTES NFR BLD AUTO: 11.9 % (ref 5–12)
NEUTROPHILS # BLD AUTO: 4.08 10*3/MM3 (ref 1.4–7)
NEUTROPHILS NFR BLD AUTO: 62.5 % (ref 42.7–76)
NRBC BLD AUTO-RTO: 0 /100 WBC (ref 0–0)
PLATELET # BLD AUTO: 281 10*3/MM3 (ref 140–450)
POTASSIUM SERPL-SCNC: 4.7 MMOL/L (ref 3.5–5.2)
PROT SERPL-MCNC: 6.6 G/DL (ref 6–8.5)
RBC # BLD AUTO: 4.43 10*6/MM3 (ref 4.14–5.8)
SODIUM SERPL-SCNC: 145 MMOL/L (ref 136–145)
T4 FREE SERPL-MCNC: 1.55 NG/DL (ref 0.93–1.7)
TRIGL SERPL-MCNC: 100 MG/DL (ref 0–150)
TSH SERPL DL<=0.005 MIU/L-ACNC: 2.22 MIU/ML (ref 0.27–4.2)
VLDLC SERPL CALC-MCNC: 20 MG/DL (ref 5–40)
WBC # BLD AUTO: 6.53 10*3/MM3 (ref 3.4–10.8)

## 2019-04-02 ENCOUNTER — OFFICE VISIT (OUTPATIENT)
Dept: FAMILY MEDICINE CLINIC | Facility: CLINIC | Age: 69
End: 2019-04-02

## 2019-04-02 VITALS
SYSTOLIC BLOOD PRESSURE: 111 MMHG | OXYGEN SATURATION: 98 % | BODY MASS INDEX: 30.62 KG/M2 | WEIGHT: 202 LBS | HEIGHT: 68 IN | HEART RATE: 62 BPM | DIASTOLIC BLOOD PRESSURE: 74 MMHG | RESPIRATION RATE: 14 BRPM | TEMPERATURE: 97.8 F

## 2019-04-02 DIAGNOSIS — Z23 IMMUNIZATION DUE: ICD-10-CM

## 2019-04-02 DIAGNOSIS — E03.9 HYPOTHYROIDISM, ACQUIRED: ICD-10-CM

## 2019-04-02 DIAGNOSIS — M51.37 DEGENERATION OF INTERVERTEBRAL DISC OF LUMBOSACRAL REGION: ICD-10-CM

## 2019-04-02 DIAGNOSIS — J30.89 CHRONIC NONSEASONAL ALLERGIC RHINITIS DUE TO POLLEN: ICD-10-CM

## 2019-04-02 DIAGNOSIS — E78.2 MIXED HYPERLIPIDEMIA: Primary | ICD-10-CM

## 2019-04-02 PROCEDURE — 99214 OFFICE O/P EST MOD 30 MIN: CPT | Performed by: FAMILY MEDICINE

## 2019-04-02 PROCEDURE — G0009 ADMIN PNEUMOCOCCAL VACCINE: HCPCS | Performed by: FAMILY MEDICINE

## 2019-04-02 PROCEDURE — 90670 PCV13 VACCINE IM: CPT | Performed by: FAMILY MEDICINE

## 2019-04-02 RX ORDER — ATORVASTATIN CALCIUM 40 MG/1
40 TABLET, FILM COATED ORAL DAILY
Qty: 90 TABLET | Refills: 3 | Status: SHIPPED | OUTPATIENT
Start: 2019-04-02 | End: 2020-04-16 | Stop reason: SDUPTHER

## 2019-04-02 RX ORDER — HYDROCODONE BITARTRATE AND ACETAMINOPHEN 5; 325 MG/1; MG/1
TABLET ORAL
Qty: 90 TABLET | Refills: 0 | Status: SHIPPED | OUTPATIENT
Start: 2019-04-02 | End: 2019-08-27 | Stop reason: SDUPTHER

## 2019-04-02 RX ORDER — FLUTICASONE PROPIONATE 50 MCG
2 SPRAY, SUSPENSION (ML) NASAL DAILY
Qty: 3 BOTTLE | Refills: 3 | Status: SHIPPED | OUTPATIENT
Start: 2019-04-02 | End: 2020-04-16 | Stop reason: SDUPTHER

## 2019-04-02 RX ORDER — LEVOTHYROXINE SODIUM 0.1 MG/1
100 TABLET ORAL DAILY
Qty: 90 TABLET | Refills: 3 | Status: SHIPPED | OUTPATIENT
Start: 2019-04-02 | End: 2020-04-16 | Stop reason: SDUPTHER

## 2019-04-02 NOTE — PROGRESS NOTES
"Subjective   Rob Shah is a 68 y.o. male.     History of Present Illness     Chief Complaint:   Chief Complaint   Patient presents with   • Hyperlipidemia     med refill - lab results - [facility]    • Hypothyroidism   • Allergies       Rob Shah 68 y.o. male who presents today for Medical Management of the below listed issues and medication refills.  he has a problem list of   Patient Active Problem List   Diagnosis   • Degeneration of lumbar intervertebral disc   • ED (erectile dysfunction)   • Hypothyroidism, acquired   • Insomnia   • Nodular prostate without urinary obstruction   • Osteoarthritis   • Hyperlipidemia   • Testicular hypofunction   • Bilateral tinnitus   • Allergic rhinitis due to pollen   • Nausea   • Neck pain   • Pain of both shoulder joints   • Degenerative disc disease, cervical   .  Since the last visit, he has overall felt well.  he has been compliant with   Current Outpatient Medications:   •  acetaminophen (TYLENOL) 325 MG tablet, Take 650 mg by mouth Every 6 (Six) Hours As Needed for Mild Pain ., Disp: , Rfl:   •  atorvastatin (LIPITOR) 40 MG tablet, Take 1 tablet by mouth Daily., Disp: 90 tablet, Rfl: 3  •  fluticasone (FLONASE) 50 MCG/ACT nasal spray, 2 sprays into the nostril(s) as directed by provider Daily., Disp: 3 bottle, Rfl: 3  •  HYDROcodone-acetaminophen (NORCO) 5-325 MG per tablet, Take 1 tablet QD, Disp: 90 tablet, Rfl: 0  •  levothyroxine (SYNTHROID) 100 MCG tablet, Take 1 tablet by mouth Daily., Disp: 90 tablet, Rfl: 3  •  Multiple Vitamins-Minerals (CENTRUM SILVER 50+MEN PO), Take  by mouth., Disp: , Rfl:   •  ondansetron ODT (ZOFRAN-ODT) 4 MG disintegrating tablet, Take 1 tablet by mouth Every 8 (Eight) Hours As Needed for Nausea or Vomiting., Disp: 30 tablet, Rfl: 2.  he denies medication side effects.    All of the chronic condition(s) listed above are stable w/o issues.    /74   Pulse 62   Temp 97.8 °F (36.6 °C) (Oral)   Resp 14   Ht 172.7 cm (68\") "   Wt 91.6 kg (202 lb)   SpO2 98%   BMI 30.71 kg/m²     Results for orders placed or performed in visit on 03/21/19   Comprehensive metabolic panel   Result Value Ref Range    Glucose 95 65 - 99 mg/dL    BUN 10 8 - 23 mg/dL    Creatinine 1.18 0.76 - 1.27 mg/dL    eGFR Non African Am 61 >60 mL/min/1.73    eGFR African Am 74 >60 mL/min/1.73    BUN/Creatinine Ratio 8.5 7.0 - 25.0    Sodium 145 136 - 145 mmol/L    Potassium 4.7 3.5 - 5.2 mmol/L    Chloride 105 98 - 107 mmol/L    Total CO2 27.2 22.0 - 29.0 mmol/L    Calcium 9.1 8.6 - 10.5 mg/dL    Total Protein 6.6 6.0 - 8.5 g/dL    Albumin 4.30 3.50 - 5.20 g/dL    Globulin 2.3 gm/dL    A/G Ratio 1.9 g/dL    Total Bilirubin 0.5 0.2 - 1.2 mg/dL    Alkaline Phosphatase 76 39 - 117 U/L    AST (SGOT) 41 (H) 1 - 40 U/L    ALT (SGPT) 34 1 - 41 U/L   Lipid Panel With LDL/HDL Ratio   Result Value Ref Range    Total Cholesterol 136 0 - 200 mg/dL    Triglycerides 100 0 - 150 mg/dL    HDL Cholesterol 58 40 - 60 mg/dL    VLDL Cholesterol 20 5 - 40 mg/dL    LDL Cholesterol  58 0 - 100 mg/dL    LDL/HDL Ratio 1.00    TSH   Result Value Ref Range    TSH 2.220 0.270 - 4.200 mIU/mL   T4, Free   Result Value Ref Range    Free T4 1.55 0.93 - 1.70 ng/dL   CBC and Differential   Result Value Ref Range    WBC 6.53 3.40 - 10.80 10*3/mm3    RBC 4.43 4.14 - 5.80 10*6/mm3    Hemoglobin 13.6 13.0 - 17.7 g/dL    Hematocrit 42.8 37.5 - 51.0 %    MCV 96.6 79.0 - 97.0 fL    MCH 30.7 26.6 - 33.0 pg    MCHC 31.8 31.5 - 35.7 g/dL    RDW 12.1 (L) 12.3 - 15.4 %    Platelets 281 140 - 450 10*3/mm3    Neutrophil Rel % 62.5 42.7 - 76.0 %    Lymphocyte Rel % 22.7 19.6 - 45.3 %    Monocyte Rel % 11.9 5.0 - 12.0 %    Eosinophil Rel % 1.8 0.3 - 6.2 %    Basophil Rel % 0.8 0.0 - 1.5 %    Neutrophils Absolute 4.08 1.40 - 7.00 10*3/mm3    Lymphocytes Absolute 1.48 0.70 - 3.10 10*3/mm3    Monocytes Absolute 0.78 0.10 - 0.90 10*3/mm3    Eosinophils Absolute 0.12 0.00 - 0.40 10*3/mm3    Basophils Absolute 0.05 0.00 -  0.20 10*3/mm3    Immature Granulocyte Rel % 0.3 0.0 - 0.5 %    Immature Grans Absolute 0.02 0.00 - 0.05 10*3/mm3    nRBC 0.0 0.0 - 0.0 /100 WBC           The following portions of the patient's history were reviewed and updated as appropriate: allergies, current medications, past family history, past medical history, past social history, past surgical history and problem list.    Review of Systems   Constitutional: Negative for activity change, chills, fatigue and fever.   Respiratory: Negative for cough and shortness of breath.    Cardiovascular: Negative for chest pain and palpitations.   Gastrointestinal: Negative for abdominal pain.   Endocrine: Negative for cold intolerance.   Psychiatric/Behavioral: Negative for behavioral problems and dysphoric mood. The patient is not nervous/anxious.        Objective   Physical Exam   Constitutional: He appears well-developed and well-nourished.   Neck: Neck supple. No thyromegaly present.   Cardiovascular: Normal rate and regular rhythm.   No murmur heard.  Pulmonary/Chest: Effort normal and breath sounds normal.   Abdominal: Bowel sounds are normal. There is no tenderness.   Psychiatric: He has a normal mood and affect. His behavior is normal.   Nursing note and vitals reviewed.  Labs reviewed with pt today during visit. All questions answered.      Assessment/Plan   Rob was seen today for hyperlipidemia, hypothyroidism and allergies.    Diagnoses and all orders for this visit:    Mixed hyperlipidemia  -     atorvastatin (LIPITOR) 40 MG tablet; Take 1 tablet by mouth Daily.    Chronic nonseasonal allergic rhinitis due to pollen  -     fluticasone (FLONASE) 50 MCG/ACT nasal spray; 2 sprays into the nostril(s) as directed by provider Daily.    Hypothyroidism, acquired  -     levothyroxine (SYNTHROID) 100 MCG tablet; Take 1 tablet by mouth Daily.    Degeneration of intervertebral disc of lumbosacral region  -     HYDROcodone-acetaminophen (NORCO) 5-325 MG per tablet; Take  1 tablet QD    Immunization due  -     Pneumococcal Conjugate Vaccine 13-[employer] All

## 2019-08-27 ENCOUNTER — OFFICE VISIT (OUTPATIENT)
Dept: FAMILY MEDICINE CLINIC | Facility: CLINIC | Age: 69
End: 2019-08-27

## 2019-08-27 VITALS
TEMPERATURE: 98.5 F | DIASTOLIC BLOOD PRESSURE: 81 MMHG | OXYGEN SATURATION: 94 % | HEIGHT: 68 IN | RESPIRATION RATE: 16 BRPM | BODY MASS INDEX: 31.07 KG/M2 | WEIGHT: 205 LBS | HEART RATE: 72 BPM | SYSTOLIC BLOOD PRESSURE: 122 MMHG

## 2019-08-27 DIAGNOSIS — M51.37 DEGENERATION OF INTERVERTEBRAL DISC OF LUMBOSACRAL REGION: ICD-10-CM

## 2019-08-27 DIAGNOSIS — M50.30 DEGENERATIVE DISC DISEASE, CERVICAL: ICD-10-CM

## 2019-08-27 DIAGNOSIS — M22.2X2 PATELLOFEMORAL PAIN SYNDROME OF LEFT KNEE: Primary | ICD-10-CM

## 2019-08-27 PROCEDURE — 73560 X-RAY EXAM OF KNEE 1 OR 2: CPT | Performed by: FAMILY MEDICINE

## 2019-08-27 PROCEDURE — 99214 OFFICE O/P EST MOD 30 MIN: CPT | Performed by: FAMILY MEDICINE

## 2019-08-27 RX ORDER — HYDROCODONE BITARTRATE AND ACETAMINOPHEN 5; 325 MG/1; MG/1
TABLET ORAL
Qty: 90 TABLET | Refills: 0 | Status: SHIPPED | OUTPATIENT
Start: 2019-08-27 | End: 2020-04-16 | Stop reason: SDUPTHER

## 2019-08-27 RX ORDER — METHYLPREDNISOLONE 4 MG/1
TABLET ORAL
Qty: 21 TABLET | Refills: 0 | Status: SHIPPED | OUTPATIENT
Start: 2019-08-27 | End: 2019-11-14 | Stop reason: HOSPADM

## 2019-08-27 RX ORDER — BACLOFEN 10 MG/1
10 TABLET ORAL DAILY
Qty: 90 TABLET | Refills: 1 | Status: SHIPPED | OUTPATIENT
Start: 2019-08-27 | End: 2020-04-16 | Stop reason: SDUPTHER

## 2019-08-27 NOTE — PROGRESS NOTES
"Subjective   Rob Shah is a 68 y.o. male.     CC: Left Knee Pain    History of Present Illness     Pt comes in today c/o left knee pain w/o injury x 2 weeks that is worse with squatting/bending/stair climbing. No locking up but sometimes feels like it could give out. Has been working on his pool. No f/c. No edema. Hurts mainly on the knee cap and in the posterior knee.      The following portions of the patient's history were reviewed and updated as appropriate: allergies, current medications, past family history, past medical history, past social history, past surgical history and problem list.    Review of Systems   Constitutional: Negative for activity change, chills, fatigue and fever.   Respiratory: Negative for cough and shortness of breath.    Cardiovascular: Negative for chest pain and palpitations.   Gastrointestinal: Negative for abdominal pain.   Endocrine: Negative for cold intolerance.   Musculoskeletal:        Knee pain   Psychiatric/Behavioral: Negative for behavioral problems and dysphoric mood. The patient is not nervous/anxious.        /81   Pulse 72   Temp 98.5 °F (36.9 °C) (Oral)   Resp 16   Ht 172.7 cm (68\")   Wt 93 kg (205 lb)   SpO2 94%   BMI 31.17 kg/m²     Objective   Physical Exam   Constitutional: He appears well-developed and well-nourished.   Neck: Neck supple. No thyromegaly present.   Cardiovascular: Normal rate and regular rhythm.   No murmur heard.  Pulmonary/Chest: Effort normal and breath sounds normal.   Abdominal: Bowel sounds are normal. There is no tenderness.   Musculoskeletal: He exhibits tenderness (infrapatellar tendon, as well as the medial joint space; no laxity.).   Psychiatric: He has a normal mood and affect. His behavior is normal.   Nursing note and vitals reviewed.  XR Knee: ordered due to pain, no comparison, read by me: wnl (MILD CALCIFICATION OF THE INFRAPATELLAR TENDON)    Assessment/Plan   Rob was seen today for left knee pain.    Diagnoses and " all orders for this visit:    Patellofemoral pain syndrome of left knee  -     XR Knee 1 or 2 View Left (In Office)  -     diclofenac (VOLTAREN) 1 % gel gel; Apply 4 g topically to the appropriate area as directed 4 (Four) Times a Day.  -     methylPREDNISolone (MEDROL) 4 MG tablet; follow package directions    Degeneration of intervertebral disc of lumbosacral region  -     HYDROcodone-acetaminophen (NORCO) 5-325 MG per tablet; Take 1 tablet QD  -     baclofen (LIORESAL) 10 MG tablet; Take 1 tablet by mouth Daily.    Degenerative disc disease, cervical  -     HYDROcodone-acetaminophen (NORCO) 5-325 MG per tablet; Take 1 tablet QD  -     baclofen (LIORESAL) 10 MG tablet; Take 1 tablet by mouth Daily.

## 2019-09-03 ENCOUNTER — PREP FOR SURGERY (OUTPATIENT)
Dept: OTHER | Facility: HOSPITAL | Age: 69
End: 2019-09-03

## 2019-09-03 DIAGNOSIS — Z86.010 HISTORY OF COLON POLYPS: Primary | ICD-10-CM

## 2019-09-05 PROBLEM — Z86.0100 HISTORY OF COLON POLYPS: Status: ACTIVE | Noted: 2019-09-05

## 2019-09-05 PROBLEM — Z86.010 HISTORY OF COLON POLYPS: Status: ACTIVE | Noted: 2019-09-05

## 2019-09-23 ENCOUNTER — TRANSCRIBE ORDERS (OUTPATIENT)
Dept: ADMINISTRATIVE | Facility: HOSPITAL | Age: 69
End: 2019-09-23

## 2019-09-23 DIAGNOSIS — Z13.6 SCREENING FOR CARDIOVASCULAR CONDITION: Primary | ICD-10-CM

## 2019-10-10 ENCOUNTER — HOSPITAL ENCOUNTER (OUTPATIENT)
Dept: CARDIOLOGY | Facility: HOSPITAL | Age: 69
Discharge: HOME | End: 2019-10-10
Admitting: FAMILY MEDICINE

## 2019-10-10 VITALS
DIASTOLIC BLOOD PRESSURE: 72 MMHG | HEIGHT: 67 IN | BODY MASS INDEX: 32.18 KG/M2 | HEART RATE: 53 BPM | WEIGHT: 205 LBS | SYSTOLIC BLOOD PRESSURE: 127 MMHG

## 2019-10-10 DIAGNOSIS — Z13.6 SCREENING FOR CARDIOVASCULAR CONDITION: ICD-10-CM

## 2019-10-10 LAB
BH CV ECHO MEAS - DIST AO DIAM: 1.47 CM
BH CV VAS BP LEFT ARM: NORMAL MMHG
BH CV VAS BP RIGHT ARM: NORMAL MMHG
BH CV XLRA MEAS - MID AO DIAM: 1.81 CM
BH CV XLRA MEAS - PAD LEFT ABI DP: 1.3
BH CV XLRA MEAS - PAD LEFT ABI PT: 1.29
BH CV XLRA MEAS - PAD LEFT ARM: 127 MMHG
BH CV XLRA MEAS - PAD LEFT LEG DP: 166 MMHG
BH CV XLRA MEAS - PAD LEFT LEG PT: 164 MMHG
BH CV XLRA MEAS - PAD RIGHT ABI DP: 1.32
BH CV XLRA MEAS - PAD RIGHT ABI PT: 1.3
BH CV XLRA MEAS - PAD RIGHT ARM: 124 MMHG
BH CV XLRA MEAS - PAD RIGHT LEG DP: 168 MMHG
BH CV XLRA MEAS - PAD RIGHT LEG PT: 166 MMHG
BH CV XLRA MEAS - PROX AO DIAM: 2.01 CM
BH CV XLRA MEAS LEFT ICA/CCA RATIO: 1.22
BH CV XLRA MEAS LEFT MID CCA PSV: NORMAL CM/SEC
BH CV XLRA MEAS LEFT MID ICA PSV: NORMAL CM/SEC
BH CV XLRA MEAS LEFT PROX ECA PSV: NORMAL CM/SEC
BH CV XLRA MEAS RIGHT ICA/CCA RATIO: 1.03
BH CV XLRA MEAS RIGHT MID CCA PSV: NORMAL CM/SEC
BH CV XLRA MEAS RIGHT MID ICA PSV: NORMAL CM/SEC
BH CV XLRA MEAS RIGHT PROX ECA PSV: NORMAL CM/SEC

## 2019-10-10 PROCEDURE — 93799 UNLISTED CV SVC/PROCEDURE: CPT

## 2019-11-13 RX ORDER — INFLUENZA VACCINE, ADJUVANTED 15; 15; 15 UG/.5ML; UG/.5ML; UG/.5ML
INJECTION, SUSPENSION INTRAMUSCULAR
Refills: 0 | COMMUNITY
Start: 2019-08-27 | End: 2019-11-13

## 2019-11-14 ENCOUNTER — HOSPITAL ENCOUNTER (OUTPATIENT)
Facility: HOSPITAL | Age: 69
Setting detail: HOSPITAL OUTPATIENT SURGERY
Discharge: HOME | End: 2019-11-14
Attending: COLON & RECTAL SURGERY | Admitting: COLON & RECTAL SURGERY

## 2019-11-14 ENCOUNTER — ANESTHESIA EVENT (OUTPATIENT)
Dept: GASTROENTEROLOGY | Facility: HOSPITAL | Age: 69
End: 2019-11-14

## 2019-11-14 ENCOUNTER — ANESTHESIA (OUTPATIENT)
Dept: GASTROENTEROLOGY | Facility: HOSPITAL | Age: 69
End: 2019-11-14

## 2019-11-14 VITALS
SYSTOLIC BLOOD PRESSURE: 131 MMHG | OXYGEN SATURATION: 99 % | HEIGHT: 68 IN | WEIGHT: 204.2 LBS | BODY MASS INDEX: 30.95 KG/M2 | TEMPERATURE: 98.7 F | RESPIRATION RATE: 18 BRPM | DIASTOLIC BLOOD PRESSURE: 92 MMHG | HEART RATE: 62 BPM

## 2019-11-14 DIAGNOSIS — Z86.010 HISTORY OF COLON POLYPS: ICD-10-CM

## 2019-11-14 PROCEDURE — 25010000002 PROPOFOL 10 MG/ML EMULSION: Performed by: ANESTHESIOLOGY

## 2019-11-14 PROCEDURE — 88305 TISSUE EXAM BY PATHOLOGIST: CPT | Performed by: COLON & RECTAL SURGERY

## 2019-11-14 PROCEDURE — 45385 COLONOSCOPY W/LESION REMOVAL: CPT | Performed by: COLON & RECTAL SURGERY

## 2019-11-14 RX ORDER — PROPOFOL 10 MG/ML
VIAL (ML) INTRAVENOUS CONTINUOUS PRN
Status: DISCONTINUED | OUTPATIENT
Start: 2019-11-14 | End: 2019-11-14 | Stop reason: SURG

## 2019-11-14 RX ORDER — LIDOCAINE HYDROCHLORIDE 20 MG/ML
INJECTION, SOLUTION INFILTRATION; PERINEURAL AS NEEDED
Status: DISCONTINUED | OUTPATIENT
Start: 2019-11-14 | End: 2019-11-14 | Stop reason: SURG

## 2019-11-14 RX ORDER — PROPOFOL 10 MG/ML
VIAL (ML) INTRAVENOUS AS NEEDED
Status: DISCONTINUED | OUTPATIENT
Start: 2019-11-14 | End: 2019-11-14 | Stop reason: SURG

## 2019-11-14 RX ORDER — SODIUM CHLORIDE, SODIUM LACTATE, POTASSIUM CHLORIDE, CALCIUM CHLORIDE 600; 310; 30; 20 MG/100ML; MG/100ML; MG/100ML; MG/100ML
30 INJECTION, SOLUTION INTRAVENOUS CONTINUOUS PRN
Status: DISCONTINUED | OUTPATIENT
Start: 2019-11-14 | End: 2019-11-14 | Stop reason: HOSPADM

## 2019-11-14 RX ADMIN — PROPOFOL 140 MCG/KG/MIN: 10 INJECTION, EMULSION INTRAVENOUS at 13:00

## 2019-11-14 RX ADMIN — SODIUM CHLORIDE, POTASSIUM CHLORIDE, SODIUM LACTATE AND CALCIUM CHLORIDE 30 ML/HR: 600; 310; 30; 20 INJECTION, SOLUTION INTRAVENOUS at 11:10

## 2019-11-14 RX ADMIN — LIDOCAINE HYDROCHLORIDE 50 MG: 20 INJECTION, SOLUTION INFILTRATION; PERINEURAL at 12:57

## 2019-11-14 RX ADMIN — PROPOFOL 190 MG: 10 INJECTION, EMULSION INTRAVENOUS at 12:57

## 2019-11-14 NOTE — ANESTHESIA PREPROCEDURE EVALUATION
Anesthesia Evaluation     Patient summary reviewed and Nursing notes reviewed   history of anesthetic complications: PONV  NPO Solid Status: > 8 hours  NPO Liquid Status: > 4 hours           Airway   Mallampati: II  TM distance: >3 FB  Neck ROM: full  No difficulty expected  Dental - normal exam     Pulmonary - normal exam    breath sounds clear to auscultation  (+) sleep apnea,   Cardiovascular - normal exam    Rhythm: regular  Rate: normal    (+) hyperlipidemia,       Neuro/Psych  (+) headaches, psychiatric history,     GI/Hepatic/Renal/Endo    (+) obesity,   liver disease, renal disease CRI,     Musculoskeletal     (+) chronic pain, neck pain,   Abdominal   (+) obese,    Substance History      OB/GYN          Other   arthritis,                    Anesthesia Plan    ASA 3     MAC     intravenous induction     Anesthetic plan, all risks, benefits, and alternatives have been provided, discussed and informed consent has been obtained with: patient.

## 2019-11-14 NOTE — H&P
Rob Shah is a 69 y.o. male  who is referred by Marcus Sandhu MD for a colonoscopy. He is an  has a history of previous adenomatous polyp.     He denies any change in bowel function, melena, or hematochezia.    Past Medical History:   Diagnosis Date   • Allergic rhinitis     CHRONIC, D/T POLLEN   • Atypical chest pain 01/16/2015    ADMITTED TO [facility]   • Bilateral tinnitus    • BPH (benign prostatic hyperplasia)     FOLLOWED BY DR. ROSE LANZA   • Bulging lumbar disc 07/01/2016    SEEN AT [facility] ER   • Chemical pneumonitis (CMS/HCC) 02/17/2019    D/T MIXING BLEACH AND LYSOL, SEEN AT [facility] ER   • Chronic daily headache 11/23/2016    SEEN AT [facility] ER   • Chronic low back pain    • Chronic neck pain    • CKD (chronic kidney disease)     STAGE II, FOLLOWED BY DR. MAX NOEL   • Colon polyps     FOLLOWED BY DR. MARCUS SANDHU   • DDD (degenerative disc disease), cervical    • DDD (degenerative disc disease), lumbosacral    • Degeneration of intervertebral disc    • ED (erectile dysfunction)     FOLLOWED BY DR. ROSE LANZA   • Global amnesia 2007    ADMITTED TO [facility]   • Herpetic gingivostomatitis 10/16/2005    SEEN AT [facility]   • Hyperlipidemia     MIXED   • Hypogonadism male     FOLLOWED BY DR. ROSE LANZA   • Hypothyroidism, acquired    • IBS (irritable bowel syndrome)    • Insomnia    • Liver hemangioma 01/18/2016    FOLLOWED BY DR. MAX NOEL   • Low testosterone     FOLLOWED BY DR. ROSE LANZA   • Lumbar strain 01/2019   • Nodular prostate without urinary obstruction     FOLLOWED BY DR. ROSE LANZA   • Osteoarthritis    • Panic attacks    • Pharyngitis 12/08/2016    SEEN AT [facility]   • PONV (postoperative nausea and vomiting)    • Sebaceous cyst 07/02/2013    RIGHT ARM, SEEN AT [facility]   • Sleep apnea    • Testicular hypofunction     FOLLOWED BY DR. ROSE LANZA       Past Surgical History:   Procedure Laterality Date   • CARDIAC CATHETERIZATION  2003    D/T CP AND ABNML LABS   •  COLONOSCOPY N/A 10/18/2011    DR.RAYMOND BELLO AT [facility]   • COLONOSCOPY N/A 11/9/2016    3 TUBULAR ADENOMA POLYPS IN CECUM, 2 TUBULAR ADENOMA POLYPS IN ASCENDING, 7 MM TUBULAR ADENOMA POLYP IN HEPATIC FLEXURE, RESCOPE IN 3 YRS, DR. MARCUS BEGUM AT [facility]   • HAND SURGERY Right 1961   • HERNIA REPAIR N/A 1978    EPIGASTRIC HERNIA REPAIR   • LUMBAR EPIDURAL INJECTION N/A 08/11/2016    DR. THOMAS CASEY AT [facility]   • VASECTOMY N/A 1972       Medications Prior to Admission   Medication Sig Dispense Refill Last Dose   • atorvastatin (LIPITOR) 40 MG tablet Take 1 tablet by mouth Daily. 90 tablet 3 11/13/2019 at Unknown time   • fluticasone (FLONASE) 50 MCG/ACT nasal spray 2 sprays into the nostril(s) as directed by provider Daily. 3 bottle 3 11/14/2019 at Unknown time   • levothyroxine (SYNTHROID) 100 MCG tablet Take 1 tablet by mouth Daily. 90 tablet 3 11/14/2019 at Unknown time   • Multiple Vitamins-Minerals (CENTRUM SILVER 50+MEN PO) Take  by mouth.   11/13/2019 at Unknown time   • acetaminophen (TYLENOL) 325 MG tablet Take 650 mg by mouth Every 6 (Six) Hours As Needed for Mild Pain .      • baclofen (LIORESAL) 10 MG tablet Take 1 tablet by mouth Daily. 90 tablet 1 More than a month at Unknown time   • diclofenac (VOLTAREN) 1 % gel gel Apply 4 g topically to the appropriate area as directed 4 (Four) Times a Day. 5 tube 2    • HYDROcodone-acetaminophen (NORCO) 5-325 MG per tablet Take 1 tablet QD 90 tablet 0 More than a month at Unknown time   • methylPREDNISolone (MEDROL) 4 MG tablet follow package directions 21 tablet 0    • ondansetron ODT (ZOFRAN-ODT) 4 MG disintegrating tablet Take 1 tablet by mouth Every 8 (Eight) Hours As Needed for Nausea or Vomiting. 30 tablet 2 More than a month at Unknown time       Allergies   Allergen Reactions   • Penicillins Other (See Comments)     Unsure, childhood       Family History   Problem Relation Age of Onset   • Heart failure Mother    • Kidney disease Mother    • Heart disease Mother     • Cancer Father         BRAIN   • Brain cancer Father    • Colon cancer Paternal Uncle    • Cancer Paternal Uncle        Social History     Socioeconomic History   • Marital status:      Spouse name: Not on file   • Number of children: 2   • Years of education: 12TH GRADE   • Highest education level: Not on file   Occupational History   • Occupation: RETIRED    Tobacco Use   • Smoking status: Former Smoker     Packs/day: 1.00     Years: 30.00     Pack years: 30.00     Types: Cigarettes     Last attempt to quit:      Years since quittin.8   • Smokeless tobacco: Never Used   Substance and Sexual Activity   • Alcohol use: Yes     Comment: RARELY   • Drug use: No   • Sexual activity: Defer       Review of Systems   Gastrointestinal: Negative for abdominal pain, nausea and vomiting.   All other systems reviewed and are negative.      Vitals:    19 1058   BP: 137/85   Pulse: 60   Resp: 16   Temp: 98.7 °F (37.1 °C)   SpO2: 97%         Physical Exam   Constitutional: He is oriented to person, place, and time. He appears well-developed and well-nourished.   HENT:   Head: Normocephalic and atraumatic.   Eyes: EOM are normal. Pupils are equal, round, and reactive to light.   Cardiovascular: Regular rhythm.   Pulmonary/Chest: Effort normal.   Abdominal: Soft. He exhibits no distension.   Musculoskeletal: Normal range of motion.   Neurological: He is alert and oriented to person, place, and time.   Skin: Skin is warm and dry.   Psychiatric: Judgment and thought content normal.         Assessment/Plan      previous adenomatous polyp.         I recommend colonoscopy.  I described risks, benefits of the procedure with the patient including but not limited to bleeding, infection, possibility of perforation and possible polypectomy. All of the patient's questions were answered and they would like to proceed with the above recommendations.

## 2019-11-14 NOTE — DISCHARGE INSTRUCTIONS
For the next 24 hours patient needs to be with a responsible adult.    For 24 hours DO NOT drive, operate machinery, appliances, drink alcohol, make important decisions or sign legal documents.    Start with a light or bland diet if you are feeling sick to your stomach otherwise advance to regular diet as tolerated.    Follow recommendations on procedure report if provided by your doctor.    Call Dr Sandhu for problems [phone]    Problems may include but not limited to: large amounts of bleeding, trouble breathing, repeated vomiting, severe unrelieved pain, fever or chills.

## 2019-11-14 NOTE — ANESTHESIA POSTPROCEDURE EVALUATION
Patient: Rob Shah    Procedure Summary     Date:  11/14/19 Room / Location:   [facility]    Anesthesia Start:  1255 Anesthesia Stop:  1322    Procedure:  COLONOSCOPY to cecum with hot snare polypectomies (N/A ) Diagnosis:       History of colon polyps      (History of colon polyps [Z86.010])    Surgeon:  Tiffany Sandhu MD Provider:  Jennifer Bruno MD    Anesthesia Type:  MAC ASA Status:  3          Anesthesia Type: MAC  Last vitals  BP   124/83 (11/14/19 1330)   Temp   37.1 °C (98.7 °F) (11/14/19 1058)   Pulse   67 (11/14/19 1330)   Resp   16 (11/14/19 1330)     SpO2   99 % (11/14/19 1330)     Post Anesthesia Care and Evaluation    Patient location during evaluation: PACU  Patient participation: complete - patient participated  Level of consciousness: awake and alert  Pain management: adequate  Airway patency: patent  Anesthetic complications: No anesthetic complications  PONV Status: none  Cardiovascular status: acceptable  Respiratory status: acceptable  Hydration status: acceptable

## 2019-11-19 LAB
LAB AP CASE REPORT: NORMAL
PATH REPORT.FINAL DX SPEC: NORMAL
PATH REPORT.GROSS SPEC: NORMAL

## 2020-04-16 ENCOUNTER — TELEMEDICINE (OUTPATIENT)
Dept: FAMILY MEDICINE CLINIC | Facility: CLINIC | Age: 70
End: 2020-04-16

## 2020-04-16 DIAGNOSIS — R11.0 NAUSEA: ICD-10-CM

## 2020-04-16 DIAGNOSIS — B00.9 HERPES SIMPLEX: ICD-10-CM

## 2020-04-16 DIAGNOSIS — E03.9 HYPOTHYROIDISM, ACQUIRED: ICD-10-CM

## 2020-04-16 DIAGNOSIS — Z00.00 ANNUAL PHYSICAL EXAM: ICD-10-CM

## 2020-04-16 DIAGNOSIS — E78.2 MIXED HYPERLIPIDEMIA: ICD-10-CM

## 2020-04-16 DIAGNOSIS — M51.37 DEGENERATION OF INTERVERTEBRAL DISC OF LUMBOSACRAL REGION: ICD-10-CM

## 2020-04-16 DIAGNOSIS — J30.89 CHRONIC NONSEASONAL ALLERGIC RHINITIS DUE TO POLLEN: Primary | ICD-10-CM

## 2020-04-16 DIAGNOSIS — M50.30 DEGENERATIVE DISC DISEASE, CERVICAL: ICD-10-CM

## 2020-04-16 PROCEDURE — 99214 OFFICE O/P EST MOD 30 MIN: CPT | Performed by: FAMILY MEDICINE

## 2020-04-16 RX ORDER — HYDROCODONE BITARTRATE AND ACETAMINOPHEN 5; 325 MG/1; MG/1
TABLET ORAL
Qty: 90 TABLET | Refills: 0 | Status: SHIPPED | OUTPATIENT
Start: 2020-04-16 | End: 2020-11-18 | Stop reason: SDUPTHER

## 2020-04-16 RX ORDER — ONDANSETRON 4 MG/1
4 TABLET, ORALLY DISINTEGRATING ORAL EVERY 8 HOURS PRN
Qty: 30 TABLET | Refills: 5 | Status: SHIPPED | OUTPATIENT
Start: 2020-04-16 | End: 2021-05-20

## 2020-04-16 RX ORDER — BACLOFEN 10 MG/1
10 TABLET ORAL DAILY
Qty: 90 TABLET | Refills: 1 | Status: SHIPPED | OUTPATIENT
Start: 2020-04-16 | End: 2020-11-18 | Stop reason: SDUPTHER

## 2020-04-16 RX ORDER — VALACYCLOVIR HYDROCHLORIDE 1 G/1
TABLET, FILM COATED ORAL
Qty: 40 TABLET | Refills: 3 | Status: SHIPPED | OUTPATIENT
Start: 2020-04-16 | End: 2021-06-19

## 2020-04-16 RX ORDER — LEVOTHYROXINE SODIUM 0.1 MG/1
100 TABLET ORAL DAILY
Qty: 90 TABLET | Refills: 1 | Status: SHIPPED | OUTPATIENT
Start: 2020-04-16 | End: 2020-11-18 | Stop reason: SDUPTHER

## 2020-04-16 RX ORDER — FLUTICASONE PROPIONATE 50 MCG
2 SPRAY, SUSPENSION (ML) NASAL DAILY
Qty: 3 BOTTLE | Refills: 3 | Status: SHIPPED | OUTPATIENT
Start: 2020-04-16 | End: 2021-04-14 | Stop reason: SDUPTHER

## 2020-04-16 RX ORDER — ATORVASTATIN CALCIUM 40 MG/1
40 TABLET, FILM COATED ORAL DAILY
Qty: 90 TABLET | Refills: 1 | Status: SHIPPED | OUTPATIENT
Start: 2020-04-16 | End: 2020-11-18 | Stop reason: SDUPTHER

## 2020-04-16 NOTE — PROGRESS NOTES
Subjective   Rob Shah is a 69 y.o. male.     CC: Video Visit for Medical Management    History of Present Illness     Chief Complaint:   Chief Complaint   Patient presents with   • Allergies   • Hyperlipidemia   • Hypothyroidism   • DDD       Rob Shah 69 y.o. male who presents today for Medical Management of the below listed issues and medication refills.  he has a problem list of   Patient Active Problem List   Diagnosis   • Degeneration of lumbar intervertebral disc   • ED (erectile dysfunction)   • Hypothyroidism, acquired   • Insomnia   • Nodular prostate without urinary obstruction   • Osteoarthritis   • Hyperlipidemia   • Testicular hypofunction   • Bilateral tinnitus   • Allergic rhinitis due to pollen   • Nausea   • Neck pain   • Pain of both shoulder joints   • Degenerative disc disease, cervical   • History of colon polyps   • Herpes simplex   .  Since the last visit, he has overall felt well.  he has been compliant with   Current Outpatient Medications:   •  atorvastatin (LIPITOR) 40 MG tablet, Take 1 tablet by mouth Daily., Disp: 90 tablet, Rfl: 1  •  azithromycin (ZITHROMAX) 250 MG tablet, Take 2 tablets the first day, then 1 tablet daily for 4 days., Disp: 6 tablet, Rfl: 0  •  baclofen (LIORESAL) 10 MG tablet, Take 1 tablet by mouth Daily., Disp: 90 tablet, Rfl: 1  •  fluticasone (FLONASE) 50 MCG/ACT nasal spray, 2 sprays into the nostril(s) as directed by provider Daily., Disp: 3 bottle, Rfl: 3  •  HYDROcodone-acetaminophen (NORCO) 5-325 MG per tablet, Take 1 tablet QD, Disp: 90 tablet, Rfl: 0  •  levothyroxine (Synthroid) 100 MCG tablet, Take 1 tablet by mouth Daily., Disp: 90 tablet, Rfl: 1  •  Multiple Vitamins-Minerals (CENTRUM SILVER 50+MEN PO), Take  by mouth., Disp: , Rfl:   •  ondansetron ODT (ZOFRAN-ODT) 4 MG disintegrating tablet, Place 1 tablet on the tongue Every 8 (Eight) Hours As Needed for Nausea or Vomiting., Disp: 30 tablet, Rfl: 5  •  valACYclovir (Valtrex) 1000 MG tablet, Take 2  tabs q12 hours x 2 doses prn, Disp: 40 tablet, Rfl: 3.  he denies medication side effects.    All of the other chronic condition(s) listed above are stable w/o issues.    There were no vitals taken for this visit.    Results for orders placed or performed during the hospital encounter of 11/14/19   Tissue Pathology Exam   Result Value Ref Range    Case Report       Surgical Pathology Report                         Case: [id number]                                  Authorizing Provider:  Tiffany Sandhu MD        Collected:           11/14/2019 01:06 PM          Ordering Location:     [facility]  Received:            11/14/2019 03:09 PM                                 [facility]                                                                  Pathologist:           Kira Joel MD                                                          Specimen:    Large Intestine, Transverse Colon, transverse colon polyp x3                               Final Diagnosis       1.  Colon, Transverse, Biopsy:  Colonic mucosa with    A.  Fragments of tubular adenoma.     mec/brb       Gross Description       1. The specimen is received in formalin labeled with the patient's name and further designated 'transverse colon polyp x 3 biopsy' are multiple small fragments of gray-tan tissue. The specimen is submitted for embedding as received.         Pt sees urology, is UTD, and gets his PSAs there.      The following portions of the patient's history were reviewed and updated as appropriate: allergies, current medications, past family history, past medical history, past social history, past surgical history and problem list.    Review of Systems   Constitutional: Negative for activity change, chills and fever.   Respiratory: Negative for cough.    Cardiovascular: Negative for chest pain.   Psychiatric/Behavioral: Negative for dysphoric mood.       Objective   Physical Exam   Constitutional: He appears well-developed and  well-nourished. No distress.   Psychiatric: He has a normal mood and affect. His behavior is normal. Thought content normal.       Assessment/Plan   Rob was seen today for allergies, hyperlipidemia, hypothyroidism and ddd.    Diagnoses and all orders for this visit:    Chronic nonseasonal allergic rhinitis due to pollen  -     fluticasone (FLONASE) 50 MCG/ACT nasal spray; 2 sprays into the nostril(s) as directed by provider Daily.    Degeneration of intervertebral disc of lumbosacral region  -     HYDROcodone-acetaminophen (NORCO) 5-325 MG per tablet; Take 1 tablet QD  -     baclofen (LIORESAL) 10 MG tablet; Take 1 tablet by mouth Daily.    Degenerative disc disease, cervical  -     HYDROcodone-acetaminophen (NORCO) 5-325 MG per tablet; Take 1 tablet QD  -     baclofen (LIORESAL) 10 MG tablet; Take 1 tablet by mouth Daily.    Mixed hyperlipidemia  -     atorvastatin (LIPITOR) 40 MG tablet; Take 1 tablet by mouth Daily.  -     Lipid panel    Hypothyroidism, acquired  -     levothyroxine (Synthroid) 100 MCG tablet; Take 1 tablet by mouth Daily.  -     TSH  -     T4, Free    Nausea  -     ondansetron ODT (ZOFRAN-ODT) 4 MG disintegrating tablet; Place 1 tablet on the tongue Every 8 (Eight) Hours As Needed for Nausea or Vomiting.    Herpes simplex  -     valACYclovir (Valtrex) 1000 MG tablet; Take 2 tabs q12 hours x 2 doses prn    Annual physical exam  Comments:  for labs only, don't bill  Orders:  -     Comprehensive metabolic panel  -     Lipid panel  -     CBC and Differential    Spent  18   minutes with chart and interview.

## 2020-08-21 ENCOUNTER — PATIENT OUTREACH (OUTPATIENT)
Dept: CASE MANAGEMENT | Facility: OTHER | Age: 70
End: 2020-08-21

## 2020-08-21 ENCOUNTER — HOSPITAL ENCOUNTER (EMERGENCY)
Facility: HOSPITAL | Age: 70
Discharge: HOME | End: 2020-08-21
Attending: EMERGENCY MEDICINE | Admitting: EMERGENCY MEDICINE

## 2020-08-21 VITALS
DIASTOLIC BLOOD PRESSURE: 86 MMHG | OXYGEN SATURATION: 99 % | WEIGHT: 200.2 LBS | RESPIRATION RATE: 16 BRPM | HEIGHT: 68 IN | SYSTOLIC BLOOD PRESSURE: 142 MMHG | TEMPERATURE: 98.9 F | BODY MASS INDEX: 30.34 KG/M2 | HEART RATE: 79 BPM

## 2020-08-21 DIAGNOSIS — M54.50 ACUTE BILATERAL LOW BACK PAIN WITHOUT SCIATICA: Primary | ICD-10-CM

## 2020-08-21 PROCEDURE — 99283 EMERGENCY DEPT VISIT LOW MDM: CPT

## 2020-08-21 PROCEDURE — 96372 THER/PROPH/DIAG INJ SC/IM: CPT

## 2020-08-21 PROCEDURE — 25010000002 PROMETHAZINE PER 50 MG: Performed by: NURSE PRACTITIONER

## 2020-08-21 PROCEDURE — 25010000002 ONDANSETRON PER 1 MG: Performed by: NURSE PRACTITIONER

## 2020-08-21 PROCEDURE — 25010000002 HYDROMORPHONE 1 MG/ML SOLUTION: Performed by: NURSE PRACTITIONER

## 2020-08-21 PROCEDURE — 96376 TX/PRO/DX INJ SAME DRUG ADON: CPT

## 2020-08-21 PROCEDURE — 96375 TX/PRO/DX INJ NEW DRUG ADDON: CPT

## 2020-08-21 PROCEDURE — 96374 THER/PROPH/DIAG INJ IV PUSH: CPT

## 2020-08-21 RX ORDER — ONDANSETRON 2 MG/ML
4 INJECTION INTRAMUSCULAR; INTRAVENOUS ONCE
Status: COMPLETED | OUTPATIENT
Start: 2020-08-21 | End: 2020-08-21

## 2020-08-21 RX ORDER — PROMETHAZINE HYDROCHLORIDE 25 MG/ML
12.5 INJECTION, SOLUTION INTRAMUSCULAR; INTRAVENOUS ONCE
Status: COMPLETED | OUTPATIENT
Start: 2020-08-21 | End: 2020-08-21

## 2020-08-21 RX ORDER — SODIUM CHLORIDE 0.9 % (FLUSH) 0.9 %
10 SYRINGE (ML) INJECTION AS NEEDED
Status: DISCONTINUED | OUTPATIENT
Start: 2020-08-21 | End: 2020-08-21 | Stop reason: HOSPADM

## 2020-08-21 RX ORDER — PROMETHAZINE HYDROCHLORIDE 25 MG/1
25 SUPPOSITORY RECTAL EVERY 6 HOURS PRN
Qty: 4 EACH | Refills: 0 | Status: SHIPPED | OUTPATIENT
Start: 2020-08-21 | End: 2020-11-18

## 2020-08-21 RX ORDER — PROMETHAZINE HYDROCHLORIDE 25 MG/1
25 TABLET ORAL EVERY 6 HOURS PRN
Qty: 12 TABLET | Refills: 0 | Status: SHIPPED | OUTPATIENT
Start: 2020-08-21 | End: 2022-11-15

## 2020-08-21 RX ORDER — CYCLOBENZAPRINE HCL 10 MG
10 TABLET ORAL ONCE
Status: COMPLETED | OUTPATIENT
Start: 2020-08-21 | End: 2020-08-21

## 2020-08-21 RX ADMIN — ONDANSETRON 4 MG: 2 INJECTION INTRAMUSCULAR; INTRAVENOUS at 05:35

## 2020-08-21 RX ADMIN — CYCLOBENZAPRINE HYDROCHLORIDE 10 MG: 10 TABLET, FILM COATED ORAL at 04:40

## 2020-08-21 RX ADMIN — ONDANSETRON 4 MG: 2 INJECTION INTRAMUSCULAR; INTRAVENOUS at 04:35

## 2020-08-21 RX ADMIN — HYDROMORPHONE HYDROCHLORIDE 1 MG: 1 INJECTION, SOLUTION INTRAMUSCULAR; INTRAVENOUS; SUBCUTANEOUS at 04:35

## 2020-08-21 RX ADMIN — PROMETHAZINE HYDROCHLORIDE 12.5 MG: 25 INJECTION INTRAMUSCULAR; INTRAVENOUS at 06:09

## 2020-08-21 NOTE — ED PROVIDER NOTES
EMERGENCY DEPARTMENT ENCOUNTER    Room Number:  03/03  Date seen:  8/24/2020  Time seen: 4:13 AM  PCP: Rob Travis MD  Historian: patient, wife  HPI:  Chief complaint:acute back pain  A complete HPI/ROS/PMH/PSH/SH/FH are unobtainable due to: not applicable  Context:Rob Shah is a 69 y.o. male who presents to the ED with c/o acute flare up of his back pain over the past 24 hours described as severe.  It is not made better by home doses of narcotics and baclofen.  It is made worse by movement and he had to call 911 to bring him here due to inability to walk.  Per patient and wife he was sitting sort of crooked on the back porch in effort to keep sun out of his face and he then began to experience the discomfort.  He has had this happen before.  He has known L4-L5 issues and has h/o being in pain management but not currently.  States he has had to come to hospital for pain relief in past.  He denies loss of bowel/bladder, saddle paresthesia, falling, foot drop or dragging his feet.  He and wife both state that pain medication tends to make him vomit.     Patient was placed in face mask in first look. Patient was wearing facemask when I entered the room and throughout our encounter. I wore full protective equipment throughout this patient encounter including a face mask, eye shield and gloves. Hand hygiene/washing of hands was performed before donning protective equipment and after removal when leaving the room.    MEDICAL RECORD REVIEW    ALLERGIES  Penicillins    PAST MEDICAL HISTORY  Active Ambulatory Problems     Diagnosis Date Noted   • Degeneration of lumbar intervertebral disc    • ED (erectile dysfunction)    • Hypothyroidism, acquired    • Insomnia    • Nodular prostate without urinary obstruction    • Osteoarthritis    • Hyperlipidemia    • Testicular hypofunction    • Bilateral tinnitus    • Allergic rhinitis due to pollen 12/15/2015   • Nausea 07/06/2016   • Neck pain 09/12/2017   • Pain of both  shoulder joints 09/12/2017   • Degenerative disc disease, cervical 09/12/2017   • History of colon polyps 09/05/2019   • Herpes simplex 04/16/2020     Resolved Ambulatory Problems     Diagnosis Date Noted   • No Resolved Ambulatory Problems     Past Medical History:   Diagnosis Date   • Allergic rhinitis    • Atypical chest pain 01/16/2015   • BPH (benign prostatic hyperplasia)    • Bulging lumbar disc 07/01/2016   • Chemical pneumonitis (CMS/HCC) 02/17/2019   • Chronic daily headache 11/23/2016   • Chronic low back pain    • Chronic neck pain    • CKD (chronic kidney disease)    • Colon polyps    • DDD (degenerative disc disease), cervical    • DDD (degenerative disc disease), lumbosacral    • Degeneration of intervertebral disc    • Global amnesia 2007   • Herpetic gingivostomatitis 10/16/2005   • Hypogonadism male    • IBS (irritable bowel syndrome)    • Liver hemangioma 01/18/2016   • Low testosterone    • Lumbar strain 08/15/2009   • Panic attacks    • Pharyngitis 12/08/2016   • PONV (postoperative nausea and vomiting)    • Sebaceous cyst 07/02/2013   • Sleep apnea    • Tinnitus 01/29/2009       PAST SURGICAL HISTORY  Past Surgical History:   Procedure Laterality Date   • CARDIAC CATHETERIZATION  2003    D/T CP AND ABNML LABS   • COLONOSCOPY N/A 10/18/2011    ENTIRE COLON WNL, RESCOPE IN 5 YRS, DR.RAYMOND BELLO AT [facility]   • COLONOSCOPY N/A 11/9/2016    3 TUBULAR ADENOMA POLYPS IN CECUM, 2 TUBULAR ADENOMA POLYPS IN ASCENDING, 7 MM TUBULAR ADENOMA POLYP IN HEPATIC FLEXURE, RESCOPE IN 3 YRS, DR. MARCUS BEGUM AT [facility]   • COLONOSCOPY N/A 11/14/2019    3 TUBULAR ADENOMA POLYPS IN TRANSVERSE, RESCOPE IN 3 YRS, DR. MARCUS BEGUM AT [facility]   • ENDOSCOPY AND COLONOSCOPY N/A 09/01/2005    NO RECORDS   • HAND SURGERY Right 1961   • HERNIA REPAIR N/A 1978    EPIGASTRIC HERNIA REPAIR   • LUMBAR EPIDURAL INJECTION N/A 08/11/2016    DR. THOMAS CASEY AT [facility]   • LUMBAR EPIDURAL INJECTION N/A 06/07/2011    DR. SULLY HERNANDEZ AT [facility]   •  LUMBAR EPIDURAL INJECTION N/A 2011    DR. BRENT DUNN AT [facility]   • LUMBAR EPIDURAL INJECTION N/A 10/07/2010    DR. JAY CHAMORRO AT [facility]   • LUMBAR EPIDURAL INJECTION N/A 2010    DR. FREDDY MEJIA AT [facility]   • LUMBAR EPIDURAL INJECTION N/A 2010    DR. FREDDY MEJIA AT [facility]   • LUMBAR EPIDURAL INJECTION N/A 2009    DR. JAY CHAMORRO AT [facility]   • LUMBAR EPIDURAL INJECTION N/A 2009    DR. BRIAN MELVIN AT [facility]   • LUMBAR EPIDURAL INJECTION N/A 2007    DR. JAY CHAMORRO AT [facility]   • LUMBAR EPIDURAL INJECTION N/A 2007    DR. AGUSTIN GONZALES AT [facility]   • LUMBAR EPIDURAL INJECTION N/A 2007    DR. KENDRA CHANG AT [facility]   • LUMBAR EPIDURAL INJECTION N/A 2006    DR. LÓPEZ DACOSTA AT [facility]   • LUMBAR EPIDURAL INJECTION N/A 2006    DR. KENDRA CHANG AT [facility]   • VASECTOMY N/A 1972       FAMILY HISTORY  Family History   Problem Relation Age of Onset   • Heart failure Mother    • Kidney disease Mother    • Heart disease Mother    • Cancer Father         BRAIN   • Brain cancer Father    • Colon cancer Paternal Uncle    • Cancer Paternal Uncle        SOCIAL HISTORY  Social History     Socioeconomic History   • Marital status:      Spouse name: Not on file   • Number of children: 2   • Years of education: 12TH GRADE   • Highest education level: Not on file   Occupational History   • Occupation: RETIRED    Tobacco Use   • Smoking status: Former Smoker     Packs/day: 1.00     Years: 30.00     Pack years: 30.00     Types: Cigarettes     Last attempt to quit:      Years since quittin.6   • Smokeless tobacco: Never Used   Substance and Sexual Activity   • Alcohol use: Yes     Comment: RARELY   • Drug use: No   • Sexual activity: Defer       REVIEW OF SYSTEMS  Review of Systems    All systems reviewed and negative except for those discussed in HPI.     PHYSICAL EXAM    ED Triage Vitals [20 0352]   Temp Heart Rate Resp BP SpO2   99.3 °F (37.4 °C) 70 16 152/94 98 %      Temp src Heart  Rate Source Patient Position BP Location FiO2 (%)   Oral Monitor Lying Right arm --     Physical Exam    I have reviewed the triage vital signs and nursing notes.      GENERAL: appears uncomfortable but not toxic  HENT: nares patent, mm moist  EYES: no scleral icterus  NECK: no ROM limitations  CV: regular rhythm, regular rate, no murmur, no rubs, no gallups  RESPIRATORY: normal effort, CTAB  ABDOMEN: soft  : deferred  MUSCULOSKELETAL: no deformity, no deformity or stepoff to lower lumbar spine.  Pedal pushes strong and equal.   NEURO: alert, moves all extremities, follows commands  SKIN: warm, dry    LAB RESULTS  No results found for this or any previous visit (from the past 24 hour(s)).      RADIOLOGY RESULTS  No orders to display     No orders to display         PROGRESS, DATA ANALYSIS, CONSULTS AND MEDICAL DECISION MAKING  All labs have been independently reviewed by me.  All radiology studies have been reviewed by me and discussed with radiologist dictating the report.  EKG's independently viewed and interpreted by me unless stated otherwise. Discussion below represents my analysis of pertinent findings related to patient's condition, differential diagnosis, treatment plan and final disposition.     ED Course as of Aug 24 1422   Fri Aug 21, 2020   0529 Pt states pain is significantly better.  He did have  a bit of nausea, pain meds tend to nauseate him; he did have a dose of Zofran.      [EW]   0533 Pt is able to sit up on side of bed.  He began to experience some nausea and vomited a small amount. Despite vomiting, his mobility is improving after pain medication.     [EW]   0535 Medical record review shows patient had similar visit in July 2016 for similar episode of LBP.     [EW]   0539 Pt is able to stand and take a few steps.  States pain is tolerable.  He is vomiting again. Zofran has been ordered.     [EW]      ED Course User Index  [EW] Arabella Dent, APRN     0600:  Transfer of care to Adelia Ventura  "KEANU pending improvement of n/v after pain medications.  I anticipate we can get him home.     DDX: flare up of acute on chronic lumbar pain, nausea/vomiting with pain meds to be considered, admission for pain management vs controlling pain in ED    MDM: The patient endorse NO: fevers, chills, recent spinal procedures, bowel/bladder incontinence, IV drug use, cancer, recent weight loss, trauma ,weakness numbness, tingling, or dysuria    0600: Reviewed pt's history and workup with Dr. Arzate.  After a bedside evaluation, Dr. Arzate agrees with the plan of care.      Disposition vitals:  /86 (BP Location: Right arm, Patient Position: Sitting)   Pulse 79   Temp 98.9 °F (37.2 °C) (Oral)   Resp 16   Ht 172.7 cm (68\")   Wt 90.8 kg (200 lb 3.2 oz)   SpO2 99%   BMI 30.44 kg/m²       DIAGNOSIS  Final diagnoses:   Acute bilateral low back pain without sciatica       FOLLOW UP   Rob Travis MD  [id number] [address]  [phone]    Schedule an appointment as soon as possible for a visit            Arabella Dent, APRN  08/24/20 1425    "

## 2020-08-21 NOTE — ED NOTES
Pt to ED from home per [facility] with reports of chronic lower back pain.  Pt tweaked back this morning and is now having severe pain.  Pt was formerly followed by pain management for this issue, but is not anymore.    Pt has taken baclofen and hydrocodone tonight without relief.      All triage performed with this RN wearing appropriate PPE.  Pt placed in mask upon arrival to ED.     Twila Welsh RN  08/21/20 0352

## 2020-08-21 NOTE — ED PROVIDER NOTES
EMERGENCY DEPARTMENT ENCOUNTER    Room number:  03/03  Date Seen:  8/21/2020  Time of transfer:0600  PCP:  Rob Travis MD    Medications ordered in ED:  Medications   sodium chloride 0.9 % flush 10 mL (has no administration in time range)   ondansetron (ZOFRAN) injection 4 mg (4 mg Intravenous Given 8/21/20 0435)   HYDROmorphone (DILAUDID) injection 1 mg (1 mg Intravenous Given 8/21/20 0435)   cyclobenzaprine (FLEXERIL) tablet 10 mg (10 mg Oral Given 8/21/20 0440)   ondansetron (ZOFRAN) injection 4 mg (4 mg Intravenous Given 8/21/20 0535)   promethazine (PHENERGAN) injection 12.5 mg (12.5 mg Intramuscular Given 8/21/20 0609)       Progress and Consult Notes:  0600:  Patient care transferred from MIRANDA Cash pending recheck and disposition.  Patient has been nauseous with dry heaving since he received pain medication we are trying to get this under control prior to discharge.    0642: Patient up walking.  Still some mild nausea although appears to be doing better.  Patient agrees he is good for discharge at this time.  Will send him home with p.o. and [facility] Phenergan as needed.  Patient has pain medication at home.  Patient agrees to plan of care.    Diagnosis:  Final diagnoses:   Acute bilateral low back pain without sciatica       Follow Up:  Rob Travis MD  [id number] [id number]  [phone]    Schedule an appointment as soon as possible for a visit         RX:     Medication List      New Prescriptions    * promethazine 25 MG suppository  Commonly known as:  PHENERGAN  Insert 1 suppository into the rectum Every 6 (Six) Hours As Needed for   Nausea or Vomiting.     * promethazine 25 MG tablet  Commonly known as:  PHENERGAN  Take 1 tablet by mouth Every 6 (Six) Hours As Needed for Nausea or   Vomiting.         * This list has 2 medication(s) that are the same as other medications   prescribed for you. Read the directions carefully, and ask your doctor or   other care  provider to review them with you.              Provider attestation:  I personally reviewed the past medical history, past surgical history, social history, family history, current medications, and allergies as they appear in the chart.    The patient was seen and examined by myself and Dr. Arzate, who agree with plan.          Adelia Ventura PA  08/21/20 0729

## 2020-08-21 NOTE — OUTREACH NOTE
Care Plan Note      Responses   Lifestyle Goals  Routine follow-up with doctor(s), Less pain, Medication management   Barriers  Pain, Disease education   Self Management  Medication Adherence   Suggested Appointments  Other (See Comment) [Follow up with Angy next week. ]   Annual Wellness Visit:   Patient Will Schedule   AWV Materials  Send Materials   Other Patient Education/Resources   24/7 [facility] Nurse Call Line [[facility] [phone]. ]   24/7 Nurse Call Line Education Method  Verbal   Does patient have depression diagnosis?  No   Advanced Directives:  Not Interested At This Time   Ed Visits past 12 months:  1   Hospitalizations past 12 months  None   Discharge destination:  Home   Medication Adherence  Medications understood   Goal Progress  Making Progress Toward Goal(s)   Readiness Scale  7   Confidence Scale  7   Health Literacy  Good        The main concerns and/or symptoms the patient would like to address are: Spoke with patient regarding his ED visit and follow up care. Patient states he slept after arriving home and did not wake in time to schedule follow up with Dr. Travis. Patient will reach out to Angy's office Monday for an appointment.    Education/instruction provided by Care Coordinator: Introduced self, explained ACM RN role and provided contact information. ACM reviewed patient's status. Patient states he feels much better. He slept after arriving home. Patient was unable to contact Dr. Travis today and will do so Monday. [facility] provided patient 24/7 Nurse call line and [facility] line as well. Patient will utilize if problems or concerns arise this weekend. AWV information sent to patient via mail. Hannah active. AD not on file.     Follow Up Outreach Due: as needed    Leticia Armendariz RN  Ambulatory     8/21/2020, 17:11

## 2020-08-21 NOTE — ED NOTES
Pt states was sitting on the deck Thursday morning, in an odd position to keep sun out of his eyes, felt increase in back pain, took baclofen and norco, but it didn't help.  Pt took both again throughout the day with last dose @ 0230.  Pt states still has back pain.  Pt denies bowel/bladder incontinence, or numbness in lower extremity.  Pt states unable to walk since 0200. I wore full protective equipment throughout this patient encounter including a face mask, eye shield and gloves. Hand hygiene/washing of hands was performed before donning protective equipment and after removal when leaving the room.       Vy Roth RN  08/21/20 0596

## 2020-10-19 ENCOUNTER — TRANSCRIBE ORDERS (OUTPATIENT)
Dept: ADMINISTRATIVE | Facility: HOSPITAL | Age: 70
End: 2020-10-19

## 2020-10-19 DIAGNOSIS — M23.204 DEGENERATIVE TEAR OF LEFT MEDIAL MENISCUS: Primary | ICD-10-CM

## 2020-11-04 ENCOUNTER — HOSPITAL ENCOUNTER (OUTPATIENT)
Dept: MRI IMAGING | Facility: HOSPITAL | Age: 70
Discharge: HOME | End: 2020-11-04
Admitting: ORTHOPAEDIC SURGERY

## 2020-11-04 DIAGNOSIS — M23.204 DEGENERATIVE TEAR OF LEFT MEDIAL MENISCUS: ICD-10-CM

## 2020-11-04 PROCEDURE — 73721 MRI JNT OF LWR EXTRE W/O DYE: CPT

## 2020-11-16 ENCOUNTER — TRANSCRIBE ORDERS (OUTPATIENT)
Dept: PREADMISSION TESTING | Facility: HOSPITAL | Age: 70
End: 2020-11-16

## 2020-11-16 ENCOUNTER — LAB (OUTPATIENT)
Dept: LAB | Facility: HOSPITAL | Age: 70
End: 2020-11-16

## 2020-11-16 DIAGNOSIS — Z01.818 OTHER SPECIFIED PRE-OPERATIVE EXAMINATION: Primary | ICD-10-CM

## 2020-11-16 LAB
ALBUMIN SERPL-MCNC: 4.3 G/DL (ref 3.5–5.2)
ALBUMIN/GLOB SERPL: 1.9 G/DL
ALP SERPL-CCNC: 87 U/L (ref 39–117)
ALT SERPL W P-5'-P-CCNC: 27 U/L (ref 1–41)
ANION GAP SERPL CALCULATED.3IONS-SCNC: 6.6 MMOL/L (ref 5–15)
AST SERPL-CCNC: 25 U/L (ref 1–40)
BASOPHILS # BLD AUTO: 0.04 10*3/MM3 (ref 0–0.2)
BASOPHILS NFR BLD AUTO: 0.5 % (ref 0–1.5)
BILIRUB SERPL-MCNC: 0.3 MG/DL (ref 0–1.2)
BUN SERPL-MCNC: 13 MG/DL (ref 8–23)
BUN/CREAT SERPL: 12.5 (ref 7–25)
CALCIUM SPEC-SCNC: 9.1 MG/DL (ref 8.6–10.5)
CHLORIDE SERPL-SCNC: 102 MMOL/L (ref 98–107)
CHOLEST SERPL-MCNC: 137 MG/DL (ref 0–200)
CO2 SERPL-SCNC: 28.4 MMOL/L (ref 22–29)
CREAT SERPL-MCNC: 1.04 MG/DL (ref 0.76–1.27)
DEPRECATED RDW RBC AUTO: 42 FL (ref 37–54)
EOSINOPHIL # BLD AUTO: 0.11 10*3/MM3 (ref 0–0.4)
EOSINOPHIL NFR BLD AUTO: 1.5 % (ref 0.3–6.2)
ERYTHROCYTE [DISTWIDTH] IN BLOOD BY AUTOMATED COUNT: 12.1 % (ref 12.3–15.4)
GFR SERPL CREATININE-BSD FRML MDRD: 70 ML/MIN/1.73
GLOBULIN UR ELPH-MCNC: 2.3 GM/DL
GLUCOSE SERPL-MCNC: 87 MG/DL (ref 65–99)
HCT VFR BLD AUTO: 41.6 % (ref 37.5–51)
HDLC SERPL-MCNC: 64 MG/DL (ref 40–60)
HGB BLD-MCNC: 14 G/DL (ref 13–17.7)
LDLC SERPL CALC-MCNC: 57 MG/DL (ref 0–100)
LDLC/HDLC SERPL: 0.87 {RATIO}
LYMPHOCYTES # BLD AUTO: 1.92 10*3/MM3 (ref 0.7–3.1)
LYMPHOCYTES NFR BLD AUTO: 25.3 % (ref 19.6–45.3)
MCH RBC QN AUTO: 31.8 PG (ref 26.6–33)
MCHC RBC AUTO-ENTMCNC: 33.7 G/DL (ref 31.5–35.7)
MCV RBC AUTO: 94.5 FL (ref 79–97)
MONOCYTES # BLD AUTO: 0.71 10*3/MM3 (ref 0.1–0.9)
MONOCYTES NFR BLD AUTO: 9.4 % (ref 5–12)
NEUTROPHILS NFR BLD AUTO: 4.78 10*3/MM3 (ref 1.7–7)
NEUTROPHILS NFR BLD AUTO: 63 % (ref 42.7–76)
PLATELET # BLD AUTO: 272 10*3/MM3 (ref 140–450)
PMV BLD AUTO: 10 FL (ref 6–12)
POTASSIUM SERPL-SCNC: 4.1 MMOL/L (ref 3.5–5.2)
PROT SERPL-MCNC: 6.6 G/DL (ref 6–8.5)
RBC # BLD AUTO: 4.4 10*6/MM3 (ref 4.14–5.8)
SODIUM SERPL-SCNC: 137 MMOL/L (ref 136–145)
T4 FREE SERPL-MCNC: 1.55 NG/DL (ref 0.93–1.7)
TRIGL SERPL-MCNC: 86 MG/DL (ref 0–150)
TSH SERPL DL<=0.05 MIU/L-ACNC: 2.06 UIU/ML (ref 0.27–4.2)
VLDLC SERPL-MCNC: 16 MG/DL (ref 5–40)
WBC # BLD AUTO: 7.58 10*3/MM3 (ref 3.4–10.8)

## 2020-11-16 PROCEDURE — 84439 ASSAY OF FREE THYROXINE: CPT | Performed by: FAMILY MEDICINE

## 2020-11-16 PROCEDURE — 85027 COMPLETE CBC AUTOMATED: CPT | Performed by: FAMILY MEDICINE

## 2020-11-16 PROCEDURE — 80061 LIPID PANEL: CPT | Performed by: FAMILY MEDICINE

## 2020-11-16 PROCEDURE — 84443 ASSAY THYROID STIM HORMONE: CPT | Performed by: FAMILY MEDICINE

## 2020-11-16 PROCEDURE — 80053 COMPREHEN METABOLIC PANEL: CPT | Performed by: FAMILY MEDICINE

## 2020-11-16 PROCEDURE — 36415 COLL VENOUS BLD VENIPUNCTURE: CPT | Performed by: FAMILY MEDICINE

## 2020-11-18 ENCOUNTER — OFFICE VISIT (OUTPATIENT)
Dept: FAMILY MEDICINE CLINIC | Facility: CLINIC | Age: 70
End: 2020-11-18

## 2020-11-18 ENCOUNTER — APPOINTMENT (OUTPATIENT)
Dept: PREADMISSION TESTING | Facility: HOSPITAL | Age: 70
End: 2020-11-18

## 2020-11-18 VITALS
DIASTOLIC BLOOD PRESSURE: 76 MMHG | WEIGHT: 199 LBS | HEART RATE: 78 BPM | OXYGEN SATURATION: 97 % | SYSTOLIC BLOOD PRESSURE: 149 MMHG | BODY MASS INDEX: 30.16 KG/M2 | HEIGHT: 68 IN | RESPIRATION RATE: 20 BRPM | TEMPERATURE: 98.7 F

## 2020-11-18 VITALS
HEART RATE: 80 BPM | OXYGEN SATURATION: 95 % | HEIGHT: 68 IN | TEMPERATURE: 97.1 F | SYSTOLIC BLOOD PRESSURE: 135 MMHG | BODY MASS INDEX: 30.62 KG/M2 | RESPIRATION RATE: 16 BRPM | DIASTOLIC BLOOD PRESSURE: 87 MMHG | WEIGHT: 202 LBS

## 2020-11-18 DIAGNOSIS — Z00.00 MEDICARE ANNUAL WELLNESS VISIT, SUBSEQUENT: Primary | ICD-10-CM

## 2020-11-18 DIAGNOSIS — M51.37 DEGENERATION OF INTERVERTEBRAL DISC OF LUMBOSACRAL REGION: ICD-10-CM

## 2020-11-18 DIAGNOSIS — M50.30 DEGENERATIVE DISC DISEASE, CERVICAL: ICD-10-CM

## 2020-11-18 DIAGNOSIS — E03.9 HYPOTHYROIDISM, ACQUIRED: ICD-10-CM

## 2020-11-18 DIAGNOSIS — E78.2 MIXED HYPERLIPIDEMIA: ICD-10-CM

## 2020-11-18 LAB — QT INTERVAL: 381 MS

## 2020-11-18 PROCEDURE — G0439 PPPS, SUBSEQ VISIT: HCPCS | Performed by: FAMILY MEDICINE

## 2020-11-18 PROCEDURE — 96160 PT-FOCUSED HLTH RISK ASSMT: CPT | Performed by: FAMILY MEDICINE

## 2020-11-18 PROCEDURE — 93005 ELECTROCARDIOGRAM TRACING: CPT

## 2020-11-18 PROCEDURE — 99214 OFFICE O/P EST MOD 30 MIN: CPT | Performed by: FAMILY MEDICINE

## 2020-11-18 PROCEDURE — 93010 ELECTROCARDIOGRAM REPORT: CPT | Performed by: INTERNAL MEDICINE

## 2020-11-18 RX ORDER — BACLOFEN 10 MG/1
10 TABLET ORAL DAILY
Qty: 90 TABLET | Refills: 0 | Status: SHIPPED | OUTPATIENT
Start: 2020-11-18 | End: 2021-04-05 | Stop reason: SDUPTHER

## 2020-11-18 RX ORDER — HYDROCODONE BITARTRATE AND ACETAMINOPHEN 5; 325 MG/1; MG/1
TABLET ORAL
Qty: 90 TABLET | Refills: 0 | Status: SHIPPED | OUTPATIENT
Start: 2020-11-18 | End: 2020-12-02 | Stop reason: HOSPADM

## 2020-11-18 RX ORDER — LEVOTHYROXINE SODIUM 0.1 MG/1
100 TABLET ORAL DAILY
Qty: 90 TABLET | Refills: 1 | Status: SHIPPED | OUTPATIENT
Start: 2020-11-18 | End: 2021-04-05 | Stop reason: SDUPTHER

## 2020-11-18 RX ORDER — ATORVASTATIN CALCIUM 40 MG/1
40 TABLET, FILM COATED ORAL DAILY
Qty: 90 TABLET | Refills: 1 | Status: SHIPPED | OUTPATIENT
Start: 2020-11-18 | End: 2021-04-05 | Stop reason: SDUPTHER

## 2020-11-18 NOTE — PROGRESS NOTES
The ABCs of the Annual Wellness Visit  Subsequent Medicare Wellness Visit    Chief Complaint   Patient presents with   • Hypothyroidism     med refills,    • Hyperlipidemia   • Medicare Wellness-subsequent       Subjective   History of Present Illness:  Rob Shah is a 70 y.o. male who presents for a Subsequent Medicare Wellness Visit.    HEALTH RISK ASSESSMENT    Recent Hospitalizations:  No hospitalization(s) within the last year.    Current Medical Providers:  Patient Care Team:  Rob Travis MD as PCP - General  Rob Travis MD as PCP - Family Medicine  Demetrius Ramon MD as Consulting Physician (Urology)    Smoking Status:  Social History     Tobacco Use   Smoking Status Former Smoker   • Packs/day: 1.00   • Years: 30.00   • Pack years: 30.00   • Types: Cigarettes   • Quit date:    • Years since quittin.8   Smokeless Tobacco Never Used       Alcohol Consumption:  Social History     Substance and Sexual Activity   Alcohol Use Yes    Comment: RARELY       Depression Screen:   PHQ-2/PHQ-9 Depression Screening 2020   Little interest or pleasure in doing things 0   Feeling down, depressed, or hopeless 0   Trouble falling or staying asleep, or sleeping too much 0   Feeling tired or having little energy 0   Poor appetite or overeating 0   Feeling bad about yourself - or that you are a failure or have let yourself or your family down 0   Trouble concentrating on things, such as reading the newspaper or watching television 0   Moving or speaking so slowly that other people could have noticed. Or the opposite - being so fidgety or restless that you have been moving around a lot more than usual 0   Thoughts that you would be better off dead, or of hurting yourself in some way 0   Total Score 0   If you checked off any problems, how difficult have these problems made it for you to do your work, take care of things at home, or get along with other people? Not difficult at all       Fall Risk  Screen:  PEACE Fall Risk Assessment was completed, and patient is at LOW risk for falls.Assessment completed on:11/18/2020    Health Habits and Functional and Cognitive Screening:  Functional & Cognitive Status 11/18/2020   Do you have difficulty preparing food and eating? No   Do you have difficulty bathing yourself, getting dressed or grooming yourself? No   Do you have difficulty using the toilet? No   Do you have difficulty moving around from place to place? No   Do you have trouble with steps or getting out of a bed or a chair? No   Current Diet Well Balanced Diet   Dental Exam Up to date   Eye Exam Up to date   Exercise (times per week) 0 times per week   Current Exercises Include No Regular Exercise   Current Exercise Activities Include -   Do you need help using the phone?  No   Are you deaf or do you have serious difficulty hearing?  No   Do you need help with transportation? No   Do you need help shopping? No   Do you need help preparing meals?  No   Do you need help with housework?  No   Do you need help with laundry? No   Do you need help taking your medications? No   Do you need help managing money? No   Do you ever drive or ride in a car without wearing a seat belt? No   Have you felt unusual stress, anger or loneliness in the last month? No   Who do you live with? Spouse   If you need help, do you have trouble finding someone available to you? No   Have you been bothered in the last four weeks by sexual problems? No   Do you have difficulty concentrating, remembering or making decisions? No         Does the patient have evidence of cognitive impairment? No    Asprin use counseling:Does not need ASA (and currently is not on it)    Age-appropriate Screening Schedule:  Refer to the list below for future screening recommendations based on patient's age, sex and/or medical conditions. Orders for these recommended tests are listed in the plan section. The patient has been provided with a written  plan.    Health Maintenance   Topic Date Due   • TDAP/TD VACCINES (1 - Tdap) 10/18/1969   • LIPID PANEL  11/16/2021   • COLONOSCOPY  11/14/2029   • INFLUENZA VACCINE  Completed   • ZOSTER VACCINE  Completed          The following portions of the patient's history were reviewed and updated as appropriate: allergies, current medications, past family history, past medical history, past social history, past surgical history and problem list.    Outpatient Medications Prior to Visit   Medication Sig Dispense Refill   • fluticasone (FLONASE) 50 MCG/ACT nasal spray 2 sprays into the nostril(s) as directed by provider Daily. 3 bottle 3   • Multiple Vitamins-Minerals (CENTRUM SILVER 50+MEN PO) Take  by mouth.     • ondansetron ODT (ZOFRAN-ODT) 4 MG disintegrating tablet Place 1 tablet on the tongue Every 8 (Eight) Hours As Needed for Nausea or Vomiting. 30 tablet 5   • promethazine (PHENERGAN) 25 MG tablet Take 1 tablet by mouth Every 6 (Six) Hours As Needed for Nausea or Vomiting. 12 tablet 0   • valACYclovir (Valtrex) 1000 MG tablet Take 2 tabs q12 hours x 2 doses prn 40 tablet 3   • atorvastatin (LIPITOR) 40 MG tablet Take 1 tablet by mouth Daily. 90 tablet 1   • baclofen (LIORESAL) 10 MG tablet Take 1 tablet by mouth Daily. 90 tablet 1   • HYDROcodone-acetaminophen (NORCO) 5-325 MG per tablet Take 1 tablet QD 90 tablet 0   • levothyroxine (Synthroid) 100 MCG tablet Take 1 tablet by mouth Daily. 90 tablet 1   • azithromycin (ZITHROMAX) 250 MG tablet Take 2 tablets the first day, then 1 tablet daily for 4 days. 6 tablet 0   • promethazine (PHENERGAN) 25 MG suppository Insert 1 suppository into the rectum Every 6 (Six) Hours As Needed for Nausea or Vomiting. 4 each 0     No facility-administered medications prior to visit.        Patient Active Problem List   Diagnosis   • Degeneration of lumbar intervertebral disc   • ED (erectile dysfunction)   • Hypothyroidism, acquired   • Insomnia   • Nodular prostate without urinary  "obstruction   • Osteoarthritis   • Hyperlipidemia   • Testicular hypofunction   • Bilateral tinnitus   • Allergic rhinitis due to pollen   • Nausea   • Neck pain   • Pain of both shoulder joints   • Degenerative disc disease, cervical   • History of colon polyps   • Herpes simplex       Advanced Care Planning:  ACP discussion was held with the patient during this visit. Patient has an advance directive (not in EMR), copy requested.    Review of Systems    Compared to one year ago, the patient feels his physical health is the same.  Compared to one year ago, the patient feels his mental health is the same.    Reviewed chart for potential of high risk medication in the elderly: yes  Reviewed chart for potential of harmful drug interactions in the elderly:yes    Objective         Vitals:    11/18/20 1301   BP: 135/87   Pulse: 80   Resp: 16   Temp: 97.1 °F (36.2 °C)   TempSrc: Skin   SpO2: 95%   Weight: 91.6 kg (202 lb)   Height: 172.7 cm (68\")   PainSc:   3   PainLoc: Knee       Body mass index is 30.71 kg/m².  Discussed the patient's BMI with him. The BMI is above average; no BMI management plan is appropriate..    Physical Exam    Lab Results   Component Value Date    TRIG 86 11/16/2020    HDL 64 (H) 11/16/2020    LDL 57 11/16/2020    VLDL 16 11/16/2020        Assessment/Plan   Medicare Risks and Personalized Health Plan  CMS Preventative Services Quick Reference  Cardiovascular risk    The above risks/problems have been discussed with the patient.  Pertinent information has been shared with the patient in the After Visit Summary.  Follow up plans and orders are seen below in the Assessment/Plan Section.    Diagnoses and all orders for this visit:    1. Medicare annual wellness visit, subsequent (Primary)    2. Mixed hyperlipidemia  -     atorvastatin (LIPITOR) 40 MG tablet; Take 1 tablet by mouth Daily.  Dispense: 90 tablet; Refill: 1    3. Degeneration of intervertebral disc of lumbosacral region  -     baclofen " (LIORESAL) 10 MG tablet; Take 1 tablet by mouth Daily.  Dispense: 90 tablet; Refill: 0  -     HYDROcodone-acetaminophen (NORCO) 5-325 MG per tablet; Take 1 tablet QD  Dispense: 90 tablet; Refill: 0    4. Degenerative disc disease, cervical  -     baclofen (LIORESAL) 10 MG tablet; Take 1 tablet by mouth Daily.  Dispense: 90 tablet; Refill: 0  -     HYDROcodone-acetaminophen (NORCO) 5-325 MG per tablet; Take 1 tablet QD  Dispense: 90 tablet; Refill: 0    5. Hypothyroidism, acquired  -     levothyroxine (Synthroid) 100 MCG tablet; Take 1 tablet by mouth Daily.  Dispense: 90 tablet; Refill: 1      Follow Up:  Return in about 6 months (around 5/18/2021) for Recheck.     An After Visit Summary and PPPS were given to the patient.

## 2020-11-18 NOTE — PATIENT INSTRUCTIONS
Medicare Wellness  Personal Prevention Plan of Service     Date of Office Visit:  2020  Encounter Provider:  Rob Travis MD  Place of Service:  [facility]  Patient Name: Rob Shah  :  1950    As part of the Medicare Wellness portion of your visit today, we are providing you with this personalized preventive plan of services (PPPS). This plan is based upon recommendations of the [place] Preventive Services Task Force (USPSTF) and the Advisory Committee on Immunization Practices (ACIP).    This lists the preventive care services that should be considered, and provides dates of when you are due. Items listed as completed are up-to-date and do not require any further intervention.    Health Maintenance   Topic Date Due   • TDAP/TD VACCINES (1 - Tdap) 10/18/1967   • LIPID PANEL  2021   • ANNUAL WELLNESS VISIT  2021   • COLONOSCOPY  2029   • INFLUENZA VACCINE  Completed   • Pneumococcal Vaccine 65+  Completed   • ZOSTER VACCINE  Completed   • HEPATITIS C SCREENING  Discontinued   • AAA SCREEN (ONE-TIME)  Discontinued       No orders of the defined types were placed in this encounter.      Return in about 6 months (around 2021) for Recheck.

## 2020-11-30 ENCOUNTER — LAB (OUTPATIENT)
Dept: LAB | Facility: HOSPITAL | Age: 70
End: 2020-11-30

## 2020-11-30 DIAGNOSIS — Z01.818 OTHER SPECIFIED PRE-OPERATIVE EXAMINATION: ICD-10-CM

## 2020-11-30 PROCEDURE — U0004 COV-19 TEST NON-CDC HGH THRU: HCPCS

## 2020-11-30 PROCEDURE — C9803 HOPD COVID-19 SPEC COLLECT: HCPCS

## 2020-12-01 LAB — SARS-COV-2 RNA RESP QL NAA+PROBE: NOT DETECTED

## 2020-12-02 ENCOUNTER — HOSPITAL ENCOUNTER (OUTPATIENT)
Facility: HOSPITAL | Age: 70
Setting detail: HOSPITAL OUTPATIENT SURGERY
Discharge: HOME | End: 2020-12-02
Attending: ORTHOPAEDIC SURGERY | Admitting: ORTHOPAEDIC SURGERY

## 2020-12-02 ENCOUNTER — ANESTHESIA (OUTPATIENT)
Dept: PERIOP | Facility: HOSPITAL | Age: 70
End: 2020-12-02

## 2020-12-02 ENCOUNTER — ANESTHESIA EVENT (OUTPATIENT)
Dept: PERIOP | Facility: HOSPITAL | Age: 70
End: 2020-12-02

## 2020-12-02 VITALS
TEMPERATURE: 98.2 F | HEIGHT: 68 IN | BODY MASS INDEX: 30.17 KG/M2 | DIASTOLIC BLOOD PRESSURE: 86 MMHG | HEART RATE: 76 BPM | WEIGHT: 199.08 LBS | OXYGEN SATURATION: 96 % | SYSTOLIC BLOOD PRESSURE: 162 MMHG | RESPIRATION RATE: 16 BRPM

## 2020-12-02 DIAGNOSIS — S83.232D COMPLEX TEAR OF MEDIAL MENISCUS OF LEFT KNEE AS CURRENT INJURY, SUBSEQUENT ENCOUNTER: Primary | ICD-10-CM

## 2020-12-02 PROCEDURE — 25010000002 DEXAMETHASONE PER 1 MG: Performed by: STUDENT IN AN ORGANIZED HEALTH CARE EDUCATION/TRAINING PROGRAM

## 2020-12-02 PROCEDURE — 25010000002 PROPOFOL 10 MG/ML EMULSION: Performed by: STUDENT IN AN ORGANIZED HEALTH CARE EDUCATION/TRAINING PROGRAM

## 2020-12-02 PROCEDURE — 25010000002 FENTANYL CITRATE (PF) 100 MCG/2ML SOLUTION: Performed by: STUDENT IN AN ORGANIZED HEALTH CARE EDUCATION/TRAINING PROGRAM

## 2020-12-02 PROCEDURE — 25010000002 ONDANSETRON PER 1 MG: Performed by: STUDENT IN AN ORGANIZED HEALTH CARE EDUCATION/TRAINING PROGRAM

## 2020-12-02 RX ORDER — PROMETHAZINE HYDROCHLORIDE 25 MG/1
25 TABLET ORAL ONCE AS NEEDED
Status: DISCONTINUED | OUTPATIENT
Start: 2020-12-02 | End: 2020-12-02 | Stop reason: HOSPADM

## 2020-12-02 RX ORDER — PROPOFOL 10 MG/ML
VIAL (ML) INTRAVENOUS AS NEEDED
Status: DISCONTINUED | OUTPATIENT
Start: 2020-12-02 | End: 2020-12-02 | Stop reason: SURG

## 2020-12-02 RX ORDER — HYDROCODONE BITARTRATE AND ACETAMINOPHEN 7.5; 325 MG/1; MG/1
1 TABLET ORAL ONCE AS NEEDED
Status: COMPLETED | OUTPATIENT
Start: 2020-12-02 | End: 2020-12-02

## 2020-12-02 RX ORDER — SODIUM CHLORIDE 0.9 % (FLUSH) 0.9 %
3-10 SYRINGE (ML) INJECTION AS NEEDED
Status: DISCONTINUED | OUTPATIENT
Start: 2020-12-02 | End: 2020-12-02 | Stop reason: HOSPADM

## 2020-12-02 RX ORDER — HYDROCODONE BITARTRATE AND ACETAMINOPHEN 5; 325 MG/1; MG/1
1 TABLET ORAL EVERY 4 HOURS PRN
Qty: 24 TABLET | Refills: 0 | Status: SHIPPED | OUTPATIENT
Start: 2020-12-02 | End: 2021-04-05 | Stop reason: SDUPTHER

## 2020-12-02 RX ORDER — TRIAMCINOLONE ACETONIDE 40 MG/ML
INJECTION, SUSPENSION INTRA-ARTICULAR; INTRAMUSCULAR
Status: DISCONTINUED
Start: 2020-12-02 | End: 2020-12-02 | Stop reason: WASHOUT

## 2020-12-02 RX ORDER — HYDROMORPHONE HYDROCHLORIDE 1 MG/ML
0.5 INJECTION, SOLUTION INTRAMUSCULAR; INTRAVENOUS; SUBCUTANEOUS
Status: DISCONTINUED | OUTPATIENT
Start: 2020-12-02 | End: 2020-12-02 | Stop reason: HOSPADM

## 2020-12-02 RX ORDER — SODIUM CHLORIDE 0.9 % (FLUSH) 0.9 %
3 SYRINGE (ML) INJECTION EVERY 12 HOURS SCHEDULED
Status: DISCONTINUED | OUTPATIENT
Start: 2020-12-02 | End: 2020-12-02 | Stop reason: HOSPADM

## 2020-12-02 RX ORDER — SODIUM CHLORIDE, SODIUM LACTATE, POTASSIUM CHLORIDE, CALCIUM CHLORIDE 600; 310; 30; 20 MG/100ML; MG/100ML; MG/100ML; MG/100ML
9 INJECTION, SOLUTION INTRAVENOUS CONTINUOUS
Status: DISCONTINUED | OUTPATIENT
Start: 2020-12-02 | End: 2020-12-02 | Stop reason: HOSPADM

## 2020-12-02 RX ORDER — ONDANSETRON 2 MG/ML
INJECTION INTRAMUSCULAR; INTRAVENOUS AS NEEDED
Status: DISCONTINUED | OUTPATIENT
Start: 2020-12-02 | End: 2020-12-02 | Stop reason: SURG

## 2020-12-02 RX ORDER — FENTANYL CITRATE 50 UG/ML
50 INJECTION, SOLUTION INTRAMUSCULAR; INTRAVENOUS
Status: DISCONTINUED | OUTPATIENT
Start: 2020-12-02 | End: 2020-12-02 | Stop reason: HOSPADM

## 2020-12-02 RX ORDER — EPHEDRINE SULFATE 50 MG/ML
5 INJECTION, SOLUTION INTRAVENOUS ONCE AS NEEDED
Status: DISCONTINUED | OUTPATIENT
Start: 2020-12-02 | End: 2020-12-02 | Stop reason: HOSPADM

## 2020-12-02 RX ORDER — BUPIVACAINE HYDROCHLORIDE AND EPINEPHRINE 5; 5 MG/ML; UG/ML
INJECTION, SOLUTION PERINEURAL AS NEEDED
Status: DISCONTINUED | OUTPATIENT
Start: 2020-12-02 | End: 2020-12-02 | Stop reason: HOSPADM

## 2020-12-02 RX ORDER — DIPHENHYDRAMINE HYDROCHLORIDE 50 MG/ML
12.5 INJECTION INTRAMUSCULAR; INTRAVENOUS
Status: DISCONTINUED | OUTPATIENT
Start: 2020-12-02 | End: 2020-12-02 | Stop reason: HOSPADM

## 2020-12-02 RX ORDER — SCOLOPAMINE TRANSDERMAL SYSTEM 1 MG/1
1 PATCH, EXTENDED RELEASE TRANSDERMAL
Status: DISCONTINUED | OUTPATIENT
Start: 2020-12-02 | End: 2020-12-02 | Stop reason: HOSPADM

## 2020-12-02 RX ORDER — PROMETHAZINE HYDROCHLORIDE 25 MG/1
25 SUPPOSITORY RECTAL ONCE AS NEEDED
Status: DISCONTINUED | OUTPATIENT
Start: 2020-12-02 | End: 2020-12-02 | Stop reason: HOSPADM

## 2020-12-02 RX ORDER — NALOXONE HCL 0.4 MG/ML
0.2 VIAL (ML) INJECTION AS NEEDED
Status: DISCONTINUED | OUTPATIENT
Start: 2020-12-02 | End: 2020-12-02 | Stop reason: HOSPADM

## 2020-12-02 RX ORDER — LIDOCAINE HYDROCHLORIDE 20 MG/ML
INJECTION, SOLUTION INFILTRATION; PERINEURAL AS NEEDED
Status: DISCONTINUED | OUTPATIENT
Start: 2020-12-02 | End: 2020-12-02 | Stop reason: SURG

## 2020-12-02 RX ORDER — LIDOCAINE HYDROCHLORIDE 10 MG/ML
0.5 INJECTION, SOLUTION EPIDURAL; INFILTRATION; INTRACAUDAL; PERINEURAL ONCE AS NEEDED
Status: DISCONTINUED | OUTPATIENT
Start: 2020-12-02 | End: 2020-12-02 | Stop reason: HOSPADM

## 2020-12-02 RX ORDER — FENTANYL CITRATE 50 UG/ML
INJECTION, SOLUTION INTRAMUSCULAR; INTRAVENOUS AS NEEDED
Status: DISCONTINUED | OUTPATIENT
Start: 2020-12-02 | End: 2020-12-02 | Stop reason: SURG

## 2020-12-02 RX ORDER — DEXAMETHASONE SODIUM PHOSPHATE 10 MG/ML
INJECTION INTRAMUSCULAR; INTRAVENOUS AS NEEDED
Status: DISCONTINUED | OUTPATIENT
Start: 2020-12-02 | End: 2020-12-02 | Stop reason: SURG

## 2020-12-02 RX ORDER — MIDAZOLAM HYDROCHLORIDE 1 MG/ML
1 INJECTION INTRAMUSCULAR; INTRAVENOUS
Status: DISCONTINUED | OUTPATIENT
Start: 2020-12-02 | End: 2020-12-02 | Stop reason: HOSPADM

## 2020-12-02 RX ORDER — FAMOTIDINE 10 MG/ML
20 INJECTION, SOLUTION INTRAVENOUS ONCE
Status: COMPLETED | OUTPATIENT
Start: 2020-12-02 | End: 2020-12-02

## 2020-12-02 RX ORDER — DIPHENHYDRAMINE HCL 25 MG
25 CAPSULE ORAL
Status: DISCONTINUED | OUTPATIENT
Start: 2020-12-02 | End: 2020-12-02 | Stop reason: HOSPADM

## 2020-12-02 RX ORDER — ONDANSETRON 2 MG/ML
4 INJECTION INTRAMUSCULAR; INTRAVENOUS ONCE AS NEEDED
Status: DISCONTINUED | OUTPATIENT
Start: 2020-12-02 | End: 2020-12-02 | Stop reason: HOSPADM

## 2020-12-02 RX ORDER — OXYCODONE AND ACETAMINOPHEN 7.5; 325 MG/1; MG/1
1 TABLET ORAL ONCE AS NEEDED
Status: DISCONTINUED | OUTPATIENT
Start: 2020-12-02 | End: 2020-12-02 | Stop reason: HOSPADM

## 2020-12-02 RX ORDER — CLINDAMYCIN PHOSPHATE 600 MG/50ML
600 INJECTION INTRAVENOUS EVERY 8 HOURS
Status: DISCONTINUED | OUTPATIENT
Start: 2020-12-02 | End: 2020-12-02 | Stop reason: HOSPADM

## 2020-12-02 RX ORDER — FLUMAZENIL 0.1 MG/ML
0.2 INJECTION INTRAVENOUS AS NEEDED
Status: DISCONTINUED | OUTPATIENT
Start: 2020-12-02 | End: 2020-12-02 | Stop reason: HOSPADM

## 2020-12-02 RX ADMIN — CLINDAMYCIN PHOSPHATE 600 MG: 12 INJECTION, SOLUTION INTRAVENOUS at 16:12

## 2020-12-02 RX ADMIN — ONDANSETRON HYDROCHLORIDE 4 MG: 2 SOLUTION INTRAMUSCULAR; INTRAVENOUS at 16:41

## 2020-12-02 RX ADMIN — FENTANYL CITRATE 50 MCG: 50 INJECTION INTRAMUSCULAR; INTRAVENOUS at 16:22

## 2020-12-02 RX ADMIN — PROPOFOL 180 MG: 10 INJECTION, EMULSION INTRAVENOUS at 16:07

## 2020-12-02 RX ADMIN — DEXAMETHASONE SODIUM PHOSPHATE 4 MG: 10 INJECTION INTRAMUSCULAR; INTRAVENOUS at 16:22

## 2020-12-02 RX ADMIN — HYDROCODONE BITARTRATE AND ACETAMINOPHEN 1 TABLET: 7.5; 325 TABLET ORAL at 17:26

## 2020-12-02 RX ADMIN — SCOPALAMINE 1 PATCH: 1 PATCH, EXTENDED RELEASE TRANSDERMAL at 14:50

## 2020-12-02 RX ADMIN — SODIUM CHLORIDE, POTASSIUM CHLORIDE, SODIUM LACTATE AND CALCIUM CHLORIDE 9 ML/HR: 600; 310; 30; 20 INJECTION, SOLUTION INTRAVENOUS at 14:44

## 2020-12-02 RX ADMIN — FAMOTIDINE 20 MG: 10 INJECTION INTRAVENOUS at 14:50

## 2020-12-02 RX ADMIN — FENTANYL CITRATE 50 MCG: 50 INJECTION INTRAMUSCULAR; INTRAVENOUS at 16:30

## 2020-12-02 RX ADMIN — LIDOCAINE HYDROCHLORIDE 100 MG: 20 INJECTION, SOLUTION INFILTRATION; PERINEURAL at 16:22

## 2020-12-02 NOTE — DISCHARGE INSTRUCTIONS
Dr. Smith  [address] [address]  [phone]    Discharge Instructions for Knee Arthroscopy      SPECIFIC POST-OP INSTRUCTIONS:  * FOLLOW UP APPOINTMENT: You will need to call our office [phone] and make a follow up appointment for    5-7 days after your date of surgery. We can see you back sooner if there are any problems or concerns.   * SUTURES: Sutures are taken out 5-7 days post-op. This will be done during your first post-op appointment in our office.   * ICE: Ice helps to decrease both pain and swelling. Ice should be applied to the front and back of the knee 20-25 minutes each hour while awake.   * DRESSING CHANGES: You can change dressing next day after surgery. You can remove tape, white gauze pads and small yellow gauze strips. We ask that you keep the incision clean and dry. Please use water proof Band-Aids to cover incision(s) while showering. These can be purchased at your local pharmacy.  These can be changed daily or as needed. Do not use any ointment on the incision(s).   * SHOWERING: The wound edges typically come together and seal by 3-5 days post-op if there is no drainage. Again, please use waterproof Band-Aids to cover incision(s) while showering. DO NOT SUBMERSE KNEE IN POOL, HOT TUB OR BATH UNTIL AT LEAST 3-4 WEEKS POST-OP (EVEN WITH WATERPROOF BANDAIDS).  * PAIN MEDICINE: You will be given a prescription for oral pain medication prior to discharge from the hospital. Please let us know if you have a sensitivity to certain pain medications prior to discharge. Additional pain medication prescriptions can be written, but must be picked up at either our [place] or [place] office locations. They can NOT be called into your pharmacy.   * ORAL ANTI-INFLAMMATORIES:   You will be asked to discontinue ALL oral anti-inflammatories 2 weeks prior to surgery. You can resume these day of surgery - AFTER YOUR PROCEDURE/ONCE YOU ARE HOME.  These can be combined with the oral pain  "medications safely. DO NOT TAKE ADDITIONAL TYLENOL WITH THE NARCOTIC PAIN MEDICATION (it already has Tylenol in it). If you were not taking an anti-inflammatory pre-op, you can start one of them the first day post-op. It will help decrease pain and swelling. Common medications and dosages are as follows:   Advil/Motrin/Ibuprofen 200mg, 4 pills every 8 hours   Aleve/Naproxen Sodium 220mg, 2 pills every 12 hours  * CRUTCHES/BRACE: You can be weight bearing as tolerated with crutches. Typically people use crutches for 3-7 days after a knee arthroscopy.  * PHYSICAL THERAPY: During your first post-op visit, we will give you a prescription to start physical therapy. This can be done downstairs at [facility] or at a location of your choice. Physical therapy is typically 2-3 times a week for 4 weeks, depending on the procedure, the individual, and his/her progress.   * DRIVING A CAR: Medically/legally we cannot recommend you drive a car while using crutches or while on pain medication.   * RETURNING TO DAILY AND RECREATIONAL ACTIVITIES: For the most part patients can progress to daily activities as tolerated (keeping in mind the restrictions listed above). Initially, we do not want you to \"overdo\" it in an attempt to minimize post-op pain and swelling. Once the swelling is controlled, you can progress with activities as tolerated. Pain and swelling should be your guide to increasing your activity level.   * RETURN TO WORK: The return to work date depends on many factors and is very dependent on the individual. You would most likely be able to return to a sedentary job after your first post-op visit (7-10 days after surgery). We can discuss specific work restrictions based on your job duties during this visit. A more physical job would obviously require a longer recovery time before return to work.     "

## 2020-12-02 NOTE — ANESTHESIA PREPROCEDURE EVALUATION
Anesthesia Evaluation     Patient summary reviewed and Nursing notes reviewed   history of anesthetic complications: PONV  NPO Solid Status: > 8 hours  NPO Liquid Status: > 4 hours           Airway   Mallampati: II  TM distance: >3 FB  Neck ROM: full  No difficulty expected  Dental - normal exam     Pulmonary - normal exam    breath sounds clear to auscultation  (+) sleep apnea,   Cardiovascular - normal exam    Rhythm: regular  Rate: normal    (+) hyperlipidemia,       Neuro/Psych  (+) headaches, psychiatric history,     GI/Hepatic/Renal/Endo    (+) obesity,   liver disease, renal disease CRI,     Musculoskeletal     (+) chronic pain, neck pain,   Abdominal   (+) obese,    Substance History      OB/GYN          Other   arthritis,                        Anesthesia Plan    ASA 3     general   (PONV, scop patch)  intravenous induction     Anesthetic plan, all risks, benefits, and alternatives have been provided, discussed and informed consent has been obtained with: patient.    Plan discussed with CRNA.

## 2020-12-02 NOTE — ANESTHESIA POSTPROCEDURE EVALUATION
Patient: Rob Shah    Procedure Summary     Date: 12/02/20 Room / Location:  [facility]  [facility]  [facility]    Anesthesia Start: 1602 Anesthesia Stop: 1653    Procedure: LEFT KNEE ARTHROSCOPY. PARTIAL MEDIAL MENISECTOMY (Left Knee) Diagnosis:     Surgeon: Juan Smith MD Provider: Rock Davis MD    Anesthesia Type: general ASA Status: 3          Anesthesia Type: general    Vitals  No vitals data found for the desired time range.          Post Anesthesia Care and Evaluation    Patient location during evaluation: bedside  Patient participation: complete - patient participated  Level of consciousness: awake and alert  Pain management: adequate  Airway patency: patent  Anesthetic complications: No anesthetic complications  PONV Status: controlled  Cardiovascular status: blood pressure returned to baseline and acceptable  Respiratory status: acceptable  Hydration status: acceptable

## 2020-12-02 NOTE — ANESTHESIA PROCEDURE NOTES
Airway  Urgency: elective    Date/Time: 12/2/2020 4:09 PM  Airway not difficult    General Information and Staff    Patient location during procedure: OR  Anesthesiologist: Rock Davis MD    Indications and Patient Condition  Indications for airway management: airway protection    Preoxygenated: yes  MILS maintained throughout  Mask difficulty assessment: 0 - not attempted    Final Airway Details  Final airway type: supraglottic airway      Successful airway: LMA  Size 4    Number of attempts at approach: 1  Assessment: lips, teeth, and gum same as pre-op and atraumatic intubation

## 2020-12-02 NOTE — OP NOTE
.KNEE ARTHROSCOPY  Procedure Note    Rob Shah  12/2/2020    Pre-op Diagnosis:   Medial meniscus tear left knee    Post-op diagnosis:  Post-Op Diagnosis Codes:  Medial meniscus tear left knee    Procedure(s):  LEFT KNEE ARTHROSCOPY. PARTIAL MEDIAL MENISECTOMY    Surgeon(s):  Juan Smith MD    Anesthesia: General  Anesthesiologist: Rock Davis MD    Staff:   Circulator: Lauro Sanches RN; Kinza Hook RN  Scrub Person: Sara Mendoza    Estimated Blood Loss: minimal    Specimens: * No orders in the log *      Findings: See Dictation    Complications: None    Procedure: The patient was taken to the operative suite.  After adequate anesthesia was established the lower extremity was prepped and draped in the usual fashion.  The left leg was elevated, exsanguinated with an Esmarch, and tourniquet inflated to 250 mmHg.  An inferior lateral portal was made and the arthroscope was introduced into the knee.  Under direct visualization an inferior medial portal was made and diagnostic arthroscopy commenced with the following findings.  The patellofemoral joint was intact.  There was some chondromalacia grade 2 on the trochlea that did not require chondroplasty.  There was a plical band that I did excised from the medial patellofemoral region that was impinging.  When the scope was placed in the medial compartment he had a large unstable oblique tear between the posterior horn and mid body extending all the way out to the periphery of the meniscus.  This created 2 unstable flaps of tissue.  The anterior and posterior cruciate ligaments were intact and the lateral compartment had normal articular cartilage and lateral meniscus.  A straight basket punch and full radius resector were used to perform a partial medial meniscectomy until a smooth contoured rim was achieved.  This involved taken a posterior posterior horn segment as well as the mid body segment all the way back towards the periphery to  achieve the stable meniscal rim.    The areas were photographed.  Vigorous irrigation and suction was performed to remove any loose debris from the knee.  The instruments are then removed and the portal sites were closed with 4-0 nylon interrupted.  The portal sites were injected with half percent Marcaine with epinephrine.  A sterile compression dressing was applied and tourniquet was released.  Patient was awoken and taken to the postanesthetic recovery unit in good condition.      Juan Smith MD     Date: 12/2/2020  Time: 16:51 EST

## 2021-02-01 ENCOUNTER — APPOINTMENT (OUTPATIENT)
Dept: VACCINE CLINIC | Facility: HOSPITAL | Age: 71
End: 2021-02-01

## 2021-02-01 ENCOUNTER — IMMUNIZATION (OUTPATIENT)
Dept: VACCINE CLINIC | Facility: HOSPITAL | Age: 71
End: 2021-02-01

## 2021-02-01 PROCEDURE — 91300 HC SARSCOV02 VAC 30MCG/0.3ML IM: CPT | Performed by: INTERNAL MEDICINE

## 2021-02-01 PROCEDURE — 0001A: CPT | Performed by: INTERNAL MEDICINE

## 2021-02-22 ENCOUNTER — IMMUNIZATION (OUTPATIENT)
Dept: VACCINE CLINIC | Facility: HOSPITAL | Age: 71
End: 2021-02-22

## 2021-02-22 PROCEDURE — 0002A: CPT | Performed by: INTERNAL MEDICINE

## 2021-02-22 PROCEDURE — 91300 HC SARSCOV02 VAC 30MCG/0.3ML IM: CPT | Performed by: INTERNAL MEDICINE

## 2021-04-04 NOTE — PROGRESS NOTES
Subjective   Rob Shah is a 70 y.o. male.     CC: Management of LBP    History of Present Illness     Pt with known h/l DDD returns today c/o increasing LBP over the past few months. No low B/B. Flared up 2 weeks ago with bending over and is quite painful. Using muscle relaxers and Nocro prn. Has h/o this prior and did excellent with epidurals with good relief.    Chief Complaint:   Chief Complaint   Patient presents with   • Back Pain     L4-L5   • Hypothyroidism   • Hyperlipidemia       Rob Shah 70 y.o. male who presents today for Medical Management of the below listed issues and medication refills.  he has a problem list of   Patient Active Problem List   Diagnosis   • Degeneration of intervertebral disc of lumbosacral region   • ED (erectile dysfunction)   • Hypothyroidism, acquired   • Insomnia   • Nodular prostate without urinary obstruction   • Osteoarthritis   • Hyperlipidemia   • Testicular hypofunction   • Bilateral tinnitus   • Allergic rhinitis due to pollen   • Nausea   • Neck pain   • Pain of both shoulder joints   • Degenerative disc disease, cervical   • History of colon polyps   • Herpes simplex   .  Since the last visit, he has overall felt well.  he has been compliant with   Current Outpatient Medications:   •  atorvastatin (LIPITOR) 40 MG tablet, Take 1 tablet by mouth Daily., Disp: 90 tablet, Rfl: 1  •  baclofen (LIORESAL) 10 MG tablet, Take 1 tablet by mouth Daily., Disp: 90 tablet, Rfl: 1  •  fluticasone (FLONASE) 50 MCG/ACT nasal spray, 2 sprays into the nostril(s) as directed by provider Daily., Disp: 3 bottle, Rfl: 3  •  HYDROcodone-acetaminophen (NORCO) 5-325 MG per tablet, Take 1 tablet by mouth Every 4 (Four) Hours As Needed for Moderate Pain  (Pain)., Disp: 40 tablet, Rfl: 0  •  levothyroxine (Synthroid) 100 MCG tablet, Take 1 tablet by mouth Daily., Disp: 90 tablet, Rfl: 1  •  Multiple Vitamins-Minerals (CENTRUM SILVER 50+MEN PO), Take  by mouth., Disp: , Rfl:   •  ondansetron  "ODT (ZOFRAN-ODT) 4 MG disintegrating tablet, Place 1 tablet on the tongue Every 8 (Eight) Hours As Needed for Nausea or Vomiting., Disp: 30 tablet, Rfl: 5  •  valACYclovir (Valtrex) 1000 MG tablet, Take 2 tabs q12 hours x 2 doses prn, Disp: 40 tablet, Rfl: 3  •  promethazine (PHENERGAN) 25 MG tablet, Take 1 tablet by mouth Every 6 (Six) Hours As Needed for Nausea or Vomiting., Disp: 12 tablet, Rfl: 0.  he denies medication side effects.    All of the other chronic condition(s) listed above are stable w/o issues.    /85   Pulse 63   Temp 97.8 °F (36.6 °C)   Resp 16   Ht 172.7 cm (67.99\")   Wt 92.5 kg (204 lb)   BMI 31.03 kg/m²     Results for orders placed or performed in visit on 11/30/20   COVID-19,BIOTAP, NP/OP SWAB IN TRANSPORT MEDIA OR SALINE 24-36 HR TAT - Swab, Nasopharynx    Specimen: Nasopharynx; Swab   Result Value Ref Range    SARS-CoV-2 PCR Not Detected Not Detected           The following portions of the patient's history were reviewed and updated as appropriate: allergies, current medications, past family history, past medical history, past social history, past surgical history and problem list.    Review of Systems   Constitutional: Negative for activity change, chills and fever.   Respiratory: Negative for cough.    Cardiovascular: Negative for chest pain.   Genitourinary: Negative for dysuria.   Musculoskeletal: Positive for back pain.   Neurological: Positive for headaches. Negative for weakness and numbness.   Psychiatric/Behavioral: Negative for dysphoric mood.       /85   Pulse 63   Temp 97.8 °F (36.6 °C)   Resp 16   Ht 172.7 cm (67.99\")   Wt 92.5 kg (204 lb)   BMI 31.03 kg/m²     Objective   Physical Exam  Constitutional:       General: He is not in acute distress.     Appearance: He is well-developed.   Pulmonary:      Effort: Pulmonary effort is normal.   Neurological:      Mental Status: He is alert and oriented to person, place, and time.   Psychiatric:         " Behavior: Behavior normal.         Thought Content: Thought content normal.     XR L-Spine: ordered due to worsening pain, no recent comparison, read by me: DDD L5/S1 with diffuse spurring but otherwise no acute pathology.    Assessment/Plan   Diagnoses and all orders for this visit:    1. Chronic bilateral low back pain without sciatica (Primary)  Comments:  worsening  Orders:  -     XR Spine Lumbar 2 or 3 View  -     HYDROcodone-acetaminophen (NORCO) 5-325 MG per tablet; Take 1 tablet by mouth Every 4 (Four) Hours As Needed for Moderate Pain  (Pain).  Dispense: 40 tablet; Refill: 0  -     MRI Lumbar Spine Without Contrast; Future    2. Hypothyroidism, acquired  -     levothyroxine (Synthroid) 100 MCG tablet; Take 1 tablet by mouth Daily.  Dispense: 90 tablet; Refill: 1    3. Mixed hyperlipidemia  -     atorvastatin (LIPITOR) 40 MG tablet; Take 1 tablet by mouth Daily.  Dispense: 90 tablet; Refill: 1    4. Degeneration of intervertebral disc of lumbosacral region  -     baclofen (LIORESAL) 10 MG tablet; Take 1 tablet by mouth Daily.  Dispense: 90 tablet; Refill: 1    5. Degenerative disc disease, cervical  -     baclofen (LIORESAL) 10 MG tablet; Take 1 tablet by mouth Daily.  Dispense: 90 tablet; Refill: 1                0 = independent

## 2021-04-05 ENCOUNTER — OFFICE VISIT (OUTPATIENT)
Dept: FAMILY MEDICINE CLINIC | Facility: CLINIC | Age: 71
End: 2021-04-05

## 2021-04-05 VITALS
RESPIRATION RATE: 16 BRPM | HEART RATE: 63 BPM | BODY MASS INDEX: 30.92 KG/M2 | DIASTOLIC BLOOD PRESSURE: 85 MMHG | TEMPERATURE: 97.8 F | SYSTOLIC BLOOD PRESSURE: 131 MMHG | WEIGHT: 204 LBS | HEIGHT: 68 IN

## 2021-04-05 DIAGNOSIS — E78.2 MIXED HYPERLIPIDEMIA: ICD-10-CM

## 2021-04-05 DIAGNOSIS — E03.9 HYPOTHYROIDISM, ACQUIRED: ICD-10-CM

## 2021-04-05 DIAGNOSIS — G89.29 CHRONIC BILATERAL LOW BACK PAIN WITHOUT SCIATICA: Primary | ICD-10-CM

## 2021-04-05 DIAGNOSIS — M50.30 DEGENERATIVE DISC DISEASE, CERVICAL: ICD-10-CM

## 2021-04-05 DIAGNOSIS — M51.37 DEGENERATION OF INTERVERTEBRAL DISC OF LUMBOSACRAL REGION: ICD-10-CM

## 2021-04-05 DIAGNOSIS — M54.50 CHRONIC BILATERAL LOW BACK PAIN WITHOUT SCIATICA: Primary | ICD-10-CM

## 2021-04-05 PROCEDURE — 72100 X-RAY EXAM L-S SPINE 2/3 VWS: CPT | Performed by: FAMILY MEDICINE

## 2021-04-05 PROCEDURE — 99214 OFFICE O/P EST MOD 30 MIN: CPT | Performed by: FAMILY MEDICINE

## 2021-04-05 RX ORDER — LEVOTHYROXINE SODIUM 0.1 MG/1
100 TABLET ORAL DAILY
Qty: 90 TABLET | Refills: 1 | Status: SHIPPED | OUTPATIENT
Start: 2021-04-05 | End: 2021-10-04 | Stop reason: SDUPTHER

## 2021-04-05 RX ORDER — HYDROCODONE BITARTRATE AND ACETAMINOPHEN 5; 325 MG/1; MG/1
1 TABLET ORAL EVERY 4 HOURS PRN
Qty: 40 TABLET | Refills: 0 | Status: SHIPPED | OUTPATIENT
Start: 2021-04-05 | End: 2021-10-04 | Stop reason: SDUPTHER

## 2021-04-05 RX ORDER — BACLOFEN 10 MG/1
10 TABLET ORAL DAILY
Qty: 90 TABLET | Refills: 1 | Status: SHIPPED | OUTPATIENT
Start: 2021-04-05 | End: 2021-10-04 | Stop reason: SDUPTHER

## 2021-04-05 RX ORDER — ATORVASTATIN CALCIUM 40 MG/1
40 TABLET, FILM COATED ORAL DAILY
Qty: 90 TABLET | Refills: 1 | Status: SHIPPED | OUTPATIENT
Start: 2021-04-05 | End: 2021-10-04 | Stop reason: SDUPTHER

## 2021-04-14 DIAGNOSIS — J30.89 CHRONIC NONSEASONAL ALLERGIC RHINITIS DUE TO POLLEN: ICD-10-CM

## 2021-04-14 RX ORDER — ALPRAZOLAM 0.5 MG/1
TABLET ORAL
Qty: 2 TABLET | Refills: 0 | Status: SHIPPED | OUTPATIENT
Start: 2021-04-14 | End: 2022-03-21

## 2021-04-14 RX ORDER — FLUTICASONE PROPIONATE 50 MCG
2 SPRAY, SUSPENSION (ML) NASAL DAILY
Qty: 48 G | Refills: 3 | Status: SHIPPED | OUTPATIENT
Start: 2021-04-14 | End: 2021-05-15 | Stop reason: SDUPTHER

## 2021-04-29 ENCOUNTER — HOSPITAL ENCOUNTER (OUTPATIENT)
Dept: MRI IMAGING | Facility: HOSPITAL | Age: 71
Discharge: HOME | End: 2021-04-29
Admitting: FAMILY MEDICINE

## 2021-04-29 DIAGNOSIS — G89.29 CHRONIC BILATERAL LOW BACK PAIN WITHOUT SCIATICA: ICD-10-CM

## 2021-04-29 DIAGNOSIS — M54.50 CHRONIC BILATERAL LOW BACK PAIN WITHOUT SCIATICA: ICD-10-CM

## 2021-04-29 PROCEDURE — 72148 MRI LUMBAR SPINE W/O DYE: CPT

## 2021-05-03 DIAGNOSIS — M51.37 DEGENERATION OF INTERVERTEBRAL DISC OF LUMBOSACRAL REGION: Primary | ICD-10-CM

## 2021-05-13 DIAGNOSIS — J30.89 CHRONIC NONSEASONAL ALLERGIC RHINITIS DUE TO POLLEN: ICD-10-CM

## 2021-05-13 RX ORDER — FLUTICASONE PROPIONATE 50 MCG
SPRAY, SUSPENSION (ML) NASAL
Qty: 48 G | Refills: 3 | OUTPATIENT
Start: 2021-05-13

## 2021-05-14 ENCOUNTER — PATIENT MESSAGE (OUTPATIENT)
Dept: FAMILY MEDICINE CLINIC | Facility: CLINIC | Age: 71
End: 2021-05-14

## 2021-05-14 DIAGNOSIS — J30.89 CHRONIC NONSEASONAL ALLERGIC RHINITIS DUE TO POLLEN: ICD-10-CM

## 2021-05-15 RX ORDER — FLUTICASONE PROPIONATE 50 MCG
2 SPRAY, SUSPENSION (ML) NASAL DAILY
Qty: 48 G | Refills: 3 | Status: SHIPPED | OUTPATIENT
Start: 2021-05-15 | End: 2022-08-18 | Stop reason: SDUPTHER

## 2021-05-20 DIAGNOSIS — R11.0 NAUSEA: ICD-10-CM

## 2021-05-20 RX ORDER — ONDANSETRON 4 MG/1
TABLET, ORALLY DISINTEGRATING ORAL
Qty: 30 TABLET | Refills: 0 | Status: SHIPPED | OUTPATIENT
Start: 2021-05-20 | End: 2021-10-05 | Stop reason: SDUPTHER

## 2021-06-18 DIAGNOSIS — B00.9 HERPES SIMPLEX: ICD-10-CM

## 2021-06-19 RX ORDER — VALACYCLOVIR HYDROCHLORIDE 1 G/1
TABLET, FILM COATED ORAL
Qty: 40 TABLET | Refills: 3 | Status: SHIPPED | OUTPATIENT
Start: 2021-06-19

## 2021-09-25 ENCOUNTER — APPOINTMENT (OUTPATIENT)
Dept: VACCINE CLINIC | Facility: HOSPITAL | Age: 71
End: 2021-09-25

## 2021-10-01 DIAGNOSIS — E78.5 HYPERLIPIDEMIA, UNSPECIFIED HYPERLIPIDEMIA TYPE: Primary | ICD-10-CM

## 2021-10-01 DIAGNOSIS — E03.9 HYPOTHYROIDISM, ACQUIRED: ICD-10-CM

## 2021-10-04 NOTE — PROGRESS NOTES
Subjective   Rob Shah is a 70 y.o. male.     History of Present Illness     Chief Complaint:   Chief Complaint   Patient presents with   • Hypothyroidism     med refill due /  no labs     • Hyperlipidemia   • Arthritis       Rob Shah 70 y.o. male who presents today for Medical Management of the below listed issues and medication refills. He  has a problem list of   Patient Active Problem List   Diagnosis   • Degeneration of intervertebral disc of lumbosacral region   • ED (erectile dysfunction)   • Hypothyroidism, acquired   • Insomnia   • Nodular prostate without urinary obstruction   • Osteoarthritis   • Hyperlipidemia   • Testicular hypofunction   • Bilateral tinnitus   • Allergic rhinitis due to pollen   • Nausea   • Neck pain   • Pain of both shoulder joints   • Degenerative disc disease, cervical   • History of colon polyps   • Herpes simplex   .  Since the last visit, He has overall felt well.  he has been compliant with   Current Outpatient Medications:   •  ALPRAZolam (Xanax) 0.5 MG tablet, Take 1 tab on the way to the exam and may repeat x 1 upon arrival prn, Disp: 2 tablet, Rfl: 0  •  atorvastatin (LIPITOR) 40 MG tablet, Take 1 tablet by mouth Daily., Disp: 90 tablet, Rfl: 1  •  baclofen (LIORESAL) 10 MG tablet, Take 1 tablet by mouth Daily., Disp: 90 tablet, Rfl: 1  •  fluticasone (FLONASE) 50 MCG/ACT nasal spray, 2 sprays into the nostril(s) as directed by provider Daily., Disp: 48 g, Rfl: 3  •  HYDROcodone-acetaminophen (NORCO) 5-325 MG per tablet, Take 1 tablet by mouth Every 4 (Four) Hours As Needed for Moderate Pain  (Pain)., Disp: 40 tablet, Rfl: 0  •  levothyroxine (Synthroid) 100 MCG tablet, Take 1 tablet by mouth Daily., Disp: 90 tablet, Rfl: 1  •  Multiple Vitamins-Minerals (CENTRUM SILVER 50+MEN PO), Take  by mouth., Disp: , Rfl:   •  ondansetron ODT (ZOFRAN-ODT) 4 MG disintegrating tablet, Place 1 tablet on the tongue Every 8 (Eight) Hours As Needed for Nausea or Vomiting., Disp: 30  "tablet, Rfl: 5  •  promethazine (PHENERGAN) 25 MG tablet, Take 1 tablet by mouth Every 6 (Six) Hours As Needed for Nausea or Vomiting., Disp: 12 tablet, Rfl: 0  •  valACYclovir (VALTREX) 1000 MG tablet, TAKE 2 TABLETS EVERY TWELVE HOURS FOR 2 DOSES AS NEEDED, Disp: 40 tablet, Rfl: 3.  He denies medication side effects.    All of the other chronic condition(s) listed above are stable w/o issues.    /87   Pulse 70   Temp 97.4 °F (36.3 °C) (Oral)   Resp 16   Ht 172.7 cm (68\")   Wt 91.6 kg (202 lb)   BMI 30.71 kg/m²     Results for orders placed or performed in visit on 11/30/20   COVID-19,BIOTAP, NP/OP SWAB IN TRANSPORT MEDIA OR SALINE 24-36 HR TAT - Swab, Nasopharynx    Specimen: Nasopharynx; Swab   Result Value Ref Range    SARS-CoV-2 PCR Not Detected Not Detected       Pt seeing urology and pain management and is UTD.      The following portions of the patient's history were reviewed and updated as appropriate: allergies, current medications, past family history, past medical history, past social history, past surgical history, and problem list.    Review of Systems   Constitutional: Negative for activity change, chills and fever.   Respiratory: Negative for cough.    Cardiovascular: Negative for chest pain.   Psychiatric/Behavioral: Negative for dysphoric mood.       Objective   Physical Exam  Constitutional:       General: He is not in acute distress.     Appearance: He is well-developed.   Cardiovascular:      Rate and Rhythm: Normal rate and regular rhythm.   Pulmonary:      Effort: Pulmonary effort is normal.      Breath sounds: Normal breath sounds.   Neurological:      Mental Status: He is alert and oriented to person, place, and time.   Psychiatric:         Behavior: Behavior normal.         Thought Content: Thought content normal.       .  The patient has read and signed the [facility] Controlled Substance Contract.  I will continue to see patient for regular follow up appointments.  They " are well controlled on their medication.  JORGE LUIS has been reviewed by me and is updated every 3 months. The patient is aware of the potential for addiction and dependence.      Assessment/Plan   Diagnoses and all orders for this visit:    1. Mixed hyperlipidemia (Primary)  -     atorvastatin (LIPITOR) 40 MG tablet; Take 1 tablet by mouth Daily.  Dispense: 90 tablet; Refill: 1    2. Degeneration of intervertebral disc of lumbosacral region  -     baclofen (LIORESAL) 10 MG tablet; Take 1 tablet by mouth Daily.  Dispense: 90 tablet; Refill: 1    3. Degenerative disc disease, cervical  -     baclofen (LIORESAL) 10 MG tablet; Take 1 tablet by mouth Daily.  Dispense: 90 tablet; Refill: 1  -     HYDROcodone-acetaminophen (NORCO) 5-325 MG per tablet; Take 1 tablet by mouth Every 4 (Four) Hours As Needed for Moderate Pain  (Pain).  Dispense: 40 tablet; Refill: 0    4. Hypothyroidism, acquired  -     levothyroxine (Synthroid) 100 MCG tablet; Take 1 tablet by mouth Daily.  Dispense: 90 tablet; Refill: 1    5. Nausea  -     ondansetron ODT (ZOFRAN-ODT) 4 MG disintegrating tablet; Place 1 tablet on the tongue Every 8 (Eight) Hours As Needed for Nausea or Vomiting.  Dispense: 30 tablet; Refill: 5

## 2021-10-05 ENCOUNTER — OFFICE VISIT (OUTPATIENT)
Dept: FAMILY MEDICINE CLINIC | Facility: CLINIC | Age: 71
End: 2021-10-05

## 2021-10-05 ENCOUNTER — LAB (OUTPATIENT)
Dept: LAB | Facility: HOSPITAL | Age: 71
End: 2021-10-05

## 2021-10-05 VITALS
TEMPERATURE: 97.4 F | BODY MASS INDEX: 30.62 KG/M2 | RESPIRATION RATE: 16 BRPM | WEIGHT: 202 LBS | HEIGHT: 68 IN | SYSTOLIC BLOOD PRESSURE: 130 MMHG | HEART RATE: 70 BPM | DIASTOLIC BLOOD PRESSURE: 87 MMHG

## 2021-10-05 DIAGNOSIS — R11.0 NAUSEA: Chronic | ICD-10-CM

## 2021-10-05 DIAGNOSIS — E03.9 HYPOTHYROIDISM, ACQUIRED: Chronic | ICD-10-CM

## 2021-10-05 DIAGNOSIS — M50.30 DEGENERATIVE DISC DISEASE, CERVICAL: Chronic | ICD-10-CM

## 2021-10-05 DIAGNOSIS — E78.2 MIXED HYPERLIPIDEMIA: Primary | Chronic | ICD-10-CM

## 2021-10-05 DIAGNOSIS — M51.37 DEGENERATION OF INTERVERTEBRAL DISC OF LUMBOSACRAL REGION: Chronic | ICD-10-CM

## 2021-10-05 LAB
T3FREE SERPL-MCNC: 2.84 PG/ML (ref 2–4.4)
T4 FREE SERPL-MCNC: 1.72 NG/DL (ref 0.93–1.7)
TSH SERPL DL<=0.05 MIU/L-ACNC: 1.44 UIU/ML (ref 0.27–4.2)

## 2021-10-05 PROCEDURE — 36415 COLL VENOUS BLD VENIPUNCTURE: CPT | Performed by: FAMILY MEDICINE

## 2021-10-05 PROCEDURE — 84439 ASSAY OF FREE THYROXINE: CPT | Performed by: FAMILY MEDICINE

## 2021-10-05 PROCEDURE — 84481 FREE ASSAY (FT-3): CPT | Performed by: FAMILY MEDICINE

## 2021-10-05 PROCEDURE — 84443 ASSAY THYROID STIM HORMONE: CPT | Performed by: FAMILY MEDICINE

## 2021-10-05 PROCEDURE — 99214 OFFICE O/P EST MOD 30 MIN: CPT | Performed by: FAMILY MEDICINE

## 2021-10-05 RX ORDER — HYDROCODONE BITARTRATE AND ACETAMINOPHEN 5; 325 MG/1; MG/1
1 TABLET ORAL EVERY 4 HOURS PRN
Qty: 40 TABLET | Refills: 0 | Status: SHIPPED | OUTPATIENT
Start: 2021-10-05 | End: 2022-01-03 | Stop reason: SDUPTHER

## 2021-10-05 RX ORDER — BACLOFEN 10 MG/1
10 TABLET ORAL DAILY
Qty: 90 TABLET | Refills: 1 | Status: SHIPPED | OUTPATIENT
Start: 2021-10-05 | End: 2022-03-17 | Stop reason: SDUPTHER

## 2021-10-05 RX ORDER — LEVOTHYROXINE SODIUM 0.1 MG/1
100 TABLET ORAL DAILY
Qty: 90 TABLET | Refills: 1 | Status: SHIPPED | OUTPATIENT
Start: 2021-10-05 | End: 2022-03-17 | Stop reason: SDUPTHER

## 2021-10-05 RX ORDER — ATORVASTATIN CALCIUM 40 MG/1
40 TABLET, FILM COATED ORAL DAILY
Qty: 90 TABLET | Refills: 1 | Status: SHIPPED | OUTPATIENT
Start: 2021-10-05 | End: 2022-03-17 | Stop reason: SDUPTHER

## 2021-10-05 RX ORDER — ONDANSETRON 4 MG/1
4 TABLET, ORALLY DISINTEGRATING ORAL EVERY 8 HOURS PRN
Qty: 30 TABLET | Refills: 5 | Status: SHIPPED | OUTPATIENT
Start: 2021-10-05

## 2021-10-09 ENCOUNTER — IMMUNIZATION (OUTPATIENT)
Dept: VACCINE CLINIC | Facility: HOSPITAL | Age: 71
End: 2021-10-09

## 2021-10-09 PROCEDURE — 0003A: CPT | Performed by: INTERNAL MEDICINE

## 2021-10-09 PROCEDURE — 0004A ADM SARSCOV2 30MCG/0.3ML BOOSTER: CPT | Performed by: INTERNAL MEDICINE

## 2021-10-09 PROCEDURE — 91300 HC SARSCOV02 VAC 30MCG/0.3ML IM: CPT | Performed by: INTERNAL MEDICINE

## 2022-01-03 ENCOUNTER — PATIENT MESSAGE (OUTPATIENT)
Dept: FAMILY MEDICINE CLINIC | Facility: CLINIC | Age: 72
End: 2022-01-03

## 2022-01-03 DIAGNOSIS — M50.30 DEGENERATIVE DISC DISEASE, CERVICAL: Chronic | ICD-10-CM

## 2022-01-03 RX ORDER — HYDROCODONE BITARTRATE AND ACETAMINOPHEN 5; 325 MG/1; MG/1
1 TABLET ORAL EVERY 4 HOURS PRN
Qty: 40 TABLET | Refills: 0 | Status: SHIPPED | OUTPATIENT
Start: 2022-01-03 | End: 2022-03-20 | Stop reason: SDUPTHER

## 2022-01-03 NOTE — TELEPHONE ENCOUNTER
From: Rob Shah  To: Rob Tarvis MD  Sent: 1/3/2022 5:24 PM EST  Subject: Refill Prescription    Dr. Travis would you please refill my Hydrocodone prescription,   40ea as the same as the last time is fine. [facility], I still have around 10 left so I should do OK until the refill arrives. The lase one was filled on 10/07 21. Thank You Very Much

## 2022-03-11 ENCOUNTER — TELEPHONE (OUTPATIENT)
Dept: FAMILY MEDICINE CLINIC | Facility: CLINIC | Age: 72
End: 2022-03-11

## 2022-03-11 DIAGNOSIS — E03.9 HYPOTHYROIDISM, ACQUIRED: ICD-10-CM

## 2022-03-11 DIAGNOSIS — F51.01 PRIMARY INSOMNIA: ICD-10-CM

## 2022-03-11 DIAGNOSIS — E78.2 MIXED HYPERLIPIDEMIA: Primary | ICD-10-CM

## 2022-03-11 NOTE — TELEPHONE ENCOUNTER
PATIENT MADE AN APPT WITH DR AMES, FOR MEDICATION REFILLS    IF YOU'D LIKE FOR HIM TO COME IN FOR LABS PRIOR TO APPT, PLEASE CALL AND ADVISE   Rob Shah (Self) [phone] (H)

## 2022-03-15 LAB
ALBUMIN SERPL-MCNC: 4.1 G/DL (ref 3.7–4.7)
ALBUMIN/GLOB SERPL: 1.6 {RATIO} (ref 1.2–2.2)
ALP SERPL-CCNC: 88 IU/L (ref 44–121)
ALT SERPL-CCNC: 19 IU/L (ref 0–44)
AST SERPL-CCNC: 24 IU/L (ref 0–40)
BASOPHILS # BLD AUTO: 0.1 X10E3/UL (ref 0–0.2)
BASOPHILS NFR BLD AUTO: 1 %
BILIRUB SERPL-MCNC: 0.4 MG/DL (ref 0–1.2)
BUN SERPL-MCNC: 12 MG/DL (ref 8–27)
BUN/CREAT SERPL: 10 (ref 10–24)
CALCIUM SERPL-MCNC: 9.2 MG/DL (ref 8.6–10.2)
CHLORIDE SERPL-SCNC: 107 MMOL/L (ref 96–106)
CHOLEST SERPL-MCNC: 150 MG/DL (ref 100–199)
CO2 SERPL-SCNC: 22 MMOL/L (ref 20–29)
CREAT SERPL-MCNC: 1.25 MG/DL (ref 0.76–1.27)
EGFR GENE MUT ANL BLD/T: 61 ML/MIN/1.73
EOSINOPHIL # BLD AUTO: 0.2 X10E3/UL (ref 0–0.4)
EOSINOPHIL NFR BLD AUTO: 3 %
ERYTHROCYTE [DISTWIDTH] IN BLOOD BY AUTOMATED COUNT: 12.3 % (ref 11.6–15.4)
GLOBULIN SER CALC-MCNC: 2.5 G/DL (ref 1.5–4.5)
GLUCOSE SERPL-MCNC: 85 MG/DL (ref 65–99)
HCT VFR BLD AUTO: 42.3 % (ref 37.5–51)
HDLC SERPL-MCNC: 50 MG/DL
HGB BLD-MCNC: 14.3 G/DL (ref 13–17.7)
IMM GRANULOCYTES # BLD AUTO: 0 X10E3/UL (ref 0–0.1)
IMM GRANULOCYTES NFR BLD AUTO: 0 %
LDLC SERPL CALC-MCNC: 73 MG/DL (ref 0–99)
LYMPHOCYTES # BLD AUTO: 1.7 X10E3/UL (ref 0.7–3.1)
LYMPHOCYTES NFR BLD AUTO: 25 %
MCH RBC QN AUTO: 31.2 PG (ref 26.6–33)
MCHC RBC AUTO-ENTMCNC: 33.8 G/DL (ref 31.5–35.7)
MCV RBC AUTO: 92 FL (ref 79–97)
MONOCYTES # BLD AUTO: 0.7 X10E3/UL (ref 0.1–0.9)
MONOCYTES NFR BLD AUTO: 10 %
NEUTROPHILS # BLD AUTO: 4.1 X10E3/UL (ref 1.4–7)
NEUTROPHILS NFR BLD AUTO: 61 %
PLATELET # BLD AUTO: 283 X10E3/UL (ref 150–450)
POTASSIUM SERPL-SCNC: 4.4 MMOL/L (ref 3.5–5.2)
PROT SERPL-MCNC: 6.6 G/DL (ref 6–8.5)
RBC # BLD AUTO: 4.59 X10E6/UL (ref 4.14–5.8)
SODIUM SERPL-SCNC: 146 MMOL/L (ref 134–144)
T4 FREE SERPL-MCNC: 1.47 NG/DL (ref 0.82–1.77)
TRIGL SERPL-MCNC: 157 MG/DL (ref 0–149)
TSH SERPL DL<=0.005 MIU/L-ACNC: 1.79 UIU/ML (ref 0.45–4.5)
VLDLC SERPL CALC-MCNC: 27 MG/DL (ref 5–40)
WBC # BLD AUTO: 6.8 X10E3/UL (ref 3.4–10.8)

## 2022-03-17 RX ORDER — ALPRAZOLAM 0.5 MG/1
TABLET ORAL
Qty: 2 TABLET | Refills: 0 | Status: CANCELLED | OUTPATIENT
Start: 2022-03-17

## 2022-03-20 NOTE — PROGRESS NOTES
Subjective   Rob Shah is a 71 y.o. male.     History of Present Illness     Chief Complaint:   Chief Complaint   Patient presents with   • Hyperlipidemia     Med refill / no labs    • Hypothyroidism   • Arthritis   • MEDICARE WELLNESS     DUE  - abn signed today    • low back pain     Bending over yesterday        Rob Shah 71 y.o. male who presents today for Medical Management of the below listed issues. He  has a problem list of   Patient Active Problem List   Diagnosis   • Degeneration of intervertebral disc of lumbosacral region   • ED (erectile dysfunction)   • Hypothyroidism, acquired   • Insomnia   • Nodular prostate without urinary obstruction   • Osteoarthritis   • Hyperlipidemia   • Testicular hypofunction   • Bilateral tinnitus   • Allergic rhinitis due to pollen   • Nausea   • Neck pain   • Pain of both shoulder joints   • Degenerative disc disease, cervical   • History of colon polyps   • Herpes simplex   .  Since the last visit, He has overall felt well, although reports some LBP since bending over yesterday. No radicular component.    he has been compliant with   Current Outpatient Medications:   •  atorvastatin (LIPITOR) 40 MG tablet, Take 1 tablet by mouth Daily., Disp: 90 tablet, Rfl: 1  •  baclofen (LIORESAL) 20 MG tablet, Take 1 tablet by mouth Every Night., Disp: 40 tablet, Rfl: 1  •  fluticasone (FLONASE) 50 MCG/ACT nasal spray, 2 sprays into the nostril(s) as directed by provider Daily., Disp: 48 g, Rfl: 3  •  HYDROcodone-acetaminophen (NORCO) 5-325 MG per tablet, Take 1 tablet by mouth Every 4 (Four) Hours As Needed for Moderate Pain  (Pain)., Disp: 40 tablet, Rfl: 0  •  levothyroxine (Synthroid) 100 MCG tablet, Take 1 tablet by mouth Daily., Disp: 90 tablet, Rfl: 1  •  Multiple Vitamins-Minerals (CENTRUM SILVER 50+MEN PO), Take  by mouth., Disp: , Rfl:   •  ondansetron ODT (ZOFRAN-ODT) 4 MG disintegrating tablet, Place 1 tablet on the tongue Every 8 (Eight) Hours As Needed for Nausea  "or Vomiting., Disp: 30 tablet, Rfl: 5  •  promethazine (PHENERGAN) 25 MG tablet, Take 1 tablet by mouth Every 6 (Six) Hours As Needed for Nausea or Vomiting., Disp: 12 tablet, Rfl: 0  •  valACYclovir (VALTREX) 1000 MG tablet, TAKE 2 TABLETS EVERY TWELVE HOURS FOR 2 DOSES AS NEEDED, Disp: 40 tablet, Rfl: 3.  He denies medication side effects.    All of the other chronic condition(s) listed above are stable w/o issues.    /84   Pulse 58   Temp 96.9 °F (36.1 °C) (Oral)   Resp 14   Ht 172.7 cm (68\")   Wt 93.4 kg (206 lb)   BMI 31.32 kg/m²     Results for orders placed or performed in visit on 03/11/22   Comprehensive metabolic panel    Specimen: Blood   Result Value Ref Range    Glucose 85 65 - 99 mg/dL    BUN 12 8 - 27 mg/dL    Creatinine 1.25 0.76 - 1.27 mg/dL    EGFR Result 61 >59 mL/min/1.73    BUN/Creatinine Ratio 10 10 - 24    Sodium 146 (H) 134 - 144 mmol/L    Potassium 4.4 3.5 - 5.2 mmol/L    Chloride 107 (H) 96 - 106 mmol/L    Total CO2 22 20 - 29 mmol/L    Calcium 9.2 8.6 - 10.2 mg/dL    Total Protein 6.6 6.0 - 8.5 g/dL    Albumin 4.1 3.7 - 4.7 g/dL    Globulin 2.5 1.5 - 4.5 g/dL    A/G Ratio 1.6 1.2 - 2.2    Total Bilirubin 0.4 0.0 - 1.2 mg/dL    Alkaline Phosphatase 88 44 - 121 IU/L    AST (SGOT) 24 0 - 40 IU/L    ALT (SGPT) 19 0 - 44 IU/L   Lipid panel    Specimen: Blood   Result Value Ref Range    Total Cholesterol 150 100 - 199 mg/dL    Triglycerides 157 (H) 0 - 149 mg/dL    HDL Cholesterol 50 >39 mg/dL    VLDL Cholesterol Fahad 27 5 - 40 mg/dL    LDL Chol Calc (NIH) 73 0 - 99 mg/dL   TSH    Specimen: Blood   Result Value Ref Range    TSH 1.790 0.450 - 4.500 uIU/mL   T4, Free    Specimen: Blood   Result Value Ref Range    Free T4 1.47 0.82 - 1.77 ng/dL   CBC and Differential    Specimen: Blood   Result Value Ref Range    WBC 6.8 3.4 - 10.8 x10E3/uL    RBC 4.59 4.14 - 5.80 x10E6/uL    Hemoglobin 14.3 13.0 - 17.7 g/dL    Hematocrit 42.3 37.5 - 51.0 %    MCV 92 79 - 97 fL    MCH 31.2 26.6 - 33.0 pg "    MCHC 33.8 31.5 - 35.7 g/dL    RDW 12.3 11.6 - 15.4 %    Platelets 283 150 - 450 x10E3/uL    Neutrophil Rel % 61 Not Estab. %    Lymphocyte Rel % 25 Not Estab. %    Monocyte Rel % 10 Not Estab. %    Eosinophil Rel % 3 Not Estab. %    Basophil Rel % 1 Not Estab. %    Neutrophils Absolute 4.1 1.4 - 7.0 x10E3/uL    Lymphocytes Absolute 1.7 0.7 - 3.1 x10E3/uL    Monocytes Absolute 0.7 0.1 - 0.9 x10E3/uL    Eosinophils Absolute 0.2 0.0 - 0.4 x10E3/uL    Basophils Absolute 0.1 0.0 - 0.2 x10E3/uL    Immature Granulocyte Rel % 0 Not Estab. %    Immature Grans Absolute 0.0 0.0 - 0.1 x10E3/uL             The following portions of the patient's history were reviewed and updated as appropriate: allergies, current medications, past family history, past medical history, past social history, past surgical history, and problem list.    Review of Systems   Constitutional: Negative for activity change, chills and fever.   Respiratory: Negative for cough.    Cardiovascular: Negative for chest pain.   Musculoskeletal: Positive for back pain.   Psychiatric/Behavioral: Negative for dysphoric mood.       Objective   Physical Exam  Constitutional:       General: He is not in acute distress.     Appearance: He is well-developed.   Cardiovascular:      Rate and Rhythm: Normal rate and regular rhythm.   Pulmonary:      Effort: Pulmonary effort is normal.      Breath sounds: Normal breath sounds.   Musculoskeletal:         General: Tenderness present.        Back:    Neurological:      Mental Status: He is alert and oriented to person, place, and time.   Psychiatric:         Behavior: Behavior normal.         Thought Content: Thought content normal.     Labs reviewed with pt today during visit. All questions answered.      The patient has read and signed the [facility] Controlled Substance Contract.  I will continue to see patient for regular follow up appointments.  They are well controlled on their medication.  JORGE LUIS has been reviewed  by me and is updated every 3 months. The patient is aware of the potential for addiction and dependence.        Assessment/Plan   Diagnoses and all orders for this visit:    1. Medicare annual wellness visit, subsequent (Primary)    2. Hypothyroidism, acquired  -     levothyroxine (Synthroid) 100 MCG tablet; Take 1 tablet by mouth Daily.  Dispense: 90 tablet; Refill: 1    3. Mixed hyperlipidemia  -     atorvastatin (LIPITOR) 40 MG tablet; Take 1 tablet by mouth Daily.  Dispense: 90 tablet; Refill: 1    4. Degeneration of intervertebral disc of lumbosacral region  -     baclofen (LIORESAL) 20 MG tablet; Take 1 tablet by mouth Every Night.  Dispense: 40 tablet; Refill: 1    5. Degenerative disc disease, cervical  -     baclofen (LIORESAL) 20 MG tablet; Take 1 tablet by mouth Every Night.  Dispense: 40 tablet; Refill: 1  -     HYDROcodone-acetaminophen (NORCO) 5-325 MG per tablet; Take 1 tablet by mouth Every 4 (Four) Hours As Needed for Moderate Pain  (Pain).  Dispense: 40 tablet; Refill: 0    Heat/stretching discussed.

## 2022-03-21 ENCOUNTER — OFFICE VISIT (OUTPATIENT)
Dept: FAMILY MEDICINE CLINIC | Facility: CLINIC | Age: 72
End: 2022-03-21

## 2022-03-21 VITALS
WEIGHT: 206 LBS | SYSTOLIC BLOOD PRESSURE: 141 MMHG | HEIGHT: 68 IN | BODY MASS INDEX: 31.22 KG/M2 | DIASTOLIC BLOOD PRESSURE: 84 MMHG | RESPIRATION RATE: 14 BRPM | TEMPERATURE: 96.9 F | HEART RATE: 58 BPM

## 2022-03-21 DIAGNOSIS — Z00.00 MEDICARE ANNUAL WELLNESS VISIT, SUBSEQUENT: Primary | ICD-10-CM

## 2022-03-21 DIAGNOSIS — M50.30 DEGENERATIVE DISC DISEASE, CERVICAL: Chronic | ICD-10-CM

## 2022-03-21 DIAGNOSIS — E03.9 HYPOTHYROIDISM, ACQUIRED: Chronic | ICD-10-CM

## 2022-03-21 DIAGNOSIS — E78.2 MIXED HYPERLIPIDEMIA: Chronic | ICD-10-CM

## 2022-03-21 DIAGNOSIS — M51.37 DEGENERATION OF INTERVERTEBRAL DISC OF LUMBOSACRAL REGION: Chronic | ICD-10-CM

## 2022-03-21 PROCEDURE — 99214 OFFICE O/P EST MOD 30 MIN: CPT | Performed by: FAMILY MEDICINE

## 2022-03-21 PROCEDURE — 96160 PT-FOCUSED HLTH RISK ASSMT: CPT | Performed by: FAMILY MEDICINE

## 2022-03-21 PROCEDURE — 1159F MED LIST DOCD IN RCRD: CPT | Performed by: FAMILY MEDICINE

## 2022-03-21 PROCEDURE — G0439 PPPS, SUBSEQ VISIT: HCPCS | Performed by: FAMILY MEDICINE

## 2022-03-21 PROCEDURE — 1170F FXNL STATUS ASSESSED: CPT | Performed by: FAMILY MEDICINE

## 2022-03-21 PROCEDURE — 1125F AMNT PAIN NOTED PAIN PRSNT: CPT | Performed by: FAMILY MEDICINE

## 2022-03-21 RX ORDER — BACLOFEN 20 MG/1
20 TABLET ORAL NIGHTLY
Qty: 40 TABLET | Refills: 1 | Status: SHIPPED | OUTPATIENT
Start: 2022-03-21 | End: 2022-08-18 | Stop reason: SDUPTHER

## 2022-03-21 RX ORDER — FLUTICASONE PROPIONATE 50 MCG
2 SPRAY, SUSPENSION (ML) NASAL DAILY
Qty: 48 G | Refills: 3 | Status: CANCELLED | OUTPATIENT
Start: 2022-03-21

## 2022-03-21 RX ORDER — LEVOTHYROXINE SODIUM 0.1 MG/1
100 TABLET ORAL DAILY
Qty: 90 TABLET | Refills: 1 | Status: SHIPPED | OUTPATIENT
Start: 2022-03-21 | End: 2022-08-18 | Stop reason: SDUPTHER

## 2022-03-21 RX ORDER — ATORVASTATIN CALCIUM 40 MG/1
40 TABLET, FILM COATED ORAL DAILY
Qty: 90 TABLET | Refills: 1 | Status: SHIPPED | OUTPATIENT
Start: 2022-03-21 | End: 2022-08-18 | Stop reason: SDUPTHER

## 2022-03-21 RX ORDER — HYDROCODONE BITARTRATE AND ACETAMINOPHEN 5; 325 MG/1; MG/1
1 TABLET ORAL EVERY 4 HOURS PRN
Qty: 40 TABLET | Refills: 0 | Status: SHIPPED | OUTPATIENT
Start: 2022-03-21 | End: 2022-05-31 | Stop reason: SDUPTHER

## 2022-03-21 NOTE — PATIENT INSTRUCTIONS
Medicare Wellness  Personal Prevention Plan of Service     Date of Office Visit:    Encounter Provider:  Rob Travis MD  Place of Service:  [facility]  Patient Name: Rob Shah  :  1950    As part of the Medicare Wellness portion of your visit today, we are providing you with this personalized preventive plan of services (PPPS). This plan is based upon recommendations of the [place] Preventive Services Task Force (USPSTF) and the Advisory Committee on Immunization Practices (ACIP).    This lists the preventive care services that should be considered, and provides dates of when you are due. Items listed as completed are up-to-date and do not require any further intervention.    Health Maintenance   Topic Date Due    LIPID PANEL  2023    ANNUAL WELLNESS VISIT  2023    COLORECTAL CANCER SCREENING  2029    TDAP/TD VACCINES (2 - Tdap) 05/15/2031    COVID-19 Vaccine  Completed    INFLUENZA VACCINE  Completed    Pneumococcal Vaccine 65+  Completed    ZOSTER VACCINE  Completed    HEPATITIS C SCREENING  Discontinued    AAA SCREEN (ONE-TIME)  Discontinued       No orders of the defined types were placed in this encounter.      Return in about 6 months (around 2022) for Recheck.

## 2022-03-21 NOTE — PROGRESS NOTES
The ABCs of the Annual Wellness Visit  Subsequent Medicare Wellness Visit    Chief Complaint   Patient presents with   • Hyperlipidemia     Med refill / no labs    • Hypothyroidism   • Arthritis   • MEDICARE WELLNESS     DUE  - abn signed today    • low back pain     Bending over yesterday       Subjective    History of Present Illness:  Rob Shah is a 71 y.o. male who presents for a Subsequent Medicare Wellness Visit.    The following portions of the patient's history were reviewed and   updated as appropriate: allergies, current medications, past family history, past medical history, past social history, past surgical history and problem list.    Compared to one year ago, the patient feels his physical   health is the same.    Compared to one year ago, the patient feels his mental   health is the same.    Recent Hospitalizations:  He was not admitted to the hospital during the last year.       Current Medical Providers:  Patient Care Team:  Rob Travis MD as PCP - General  Rob Travis MD as PCP - Family Medicine  Demetrius Ramon MD as Consulting Physician (Urology)    Outpatient Medications Prior to Visit   Medication Sig Dispense Refill   • fluticasone (FLONASE) 50 MCG/ACT nasal spray 2 sprays into the nostril(s) as directed by provider Daily. 48 g 3   • Multiple Vitamins-Minerals (CENTRUM SILVER 50+MEN PO) Take  by mouth.     • ondansetron ODT (ZOFRAN-ODT) 4 MG disintegrating tablet Place 1 tablet on the tongue Every 8 (Eight) Hours As Needed for Nausea or Vomiting. 30 tablet 5   • promethazine (PHENERGAN) 25 MG tablet Take 1 tablet by mouth Every 6 (Six) Hours As Needed for Nausea or Vomiting. 12 tablet 0   • valACYclovir (VALTREX) 1000 MG tablet TAKE 2 TABLETS EVERY TWELVE HOURS FOR 2 DOSES AS NEEDED 40 tablet 3   • ALPRAZolam (Xanax) 0.5 MG tablet Take 1 tab on the way to the exam and may repeat x 1 upon arrival prn 2 tablet 0   • atorvastatin (LIPITOR) 40 MG tablet Take 1 tablet by mouth  "Daily. 90 tablet 1   • azithromycin (ZITHROMAX) 250 MG tablet Take 2 tablets the first day, then 1 tablet daily for 4 days. 6 tablet 0   • baclofen (LIORESAL) 10 MG tablet Take 1 tablet by mouth Daily. 90 tablet 1   • HYDROcodone-acetaminophen (NORCO) 5-325 MG per tablet Take 1 tablet by mouth Every 4 (Four) Hours As Needed for Moderate Pain  (Pain). 40 tablet 0   • levothyroxine (Synthroid) 100 MCG tablet Take 1 tablet by mouth Daily. 90 tablet 1     No facility-administered medications prior to visit.       Opioid medication/s are on active medication list.  and I have evaluated his active treatment plan and pain score trends (see table).  Vitals:    03/17/22 0748   PainSc:   3     I have reviewed the chart for potential of high risk medication and harmful drug interactions in the elderly.            Aspirin is not on active medication list.  Aspirin use is not indicated based on review of current medical condition/s. Risk of harm outweighs potential benefits.  .    Patient Active Problem List   Diagnosis   • Degeneration of intervertebral disc of lumbosacral region   • ED (erectile dysfunction)   • Hypothyroidism, acquired   • Insomnia   • Nodular prostate without urinary obstruction   • Osteoarthritis   • Hyperlipidemia   • Testicular hypofunction   • Bilateral tinnitus   • Allergic rhinitis due to pollen   • Nausea   • Neck pain   • Pain of both shoulder joints   • Degenerative disc disease, cervical   • History of colon polyps   • Herpes simplex     Advance Care Planning  Advance Directive is not on file.  ACP discussion was held with the patient during this visit. Patient has an advance directive (not in EMR), copy requested.          Objective    Vitals:    03/17/22 0748   BP: 141/84   Pulse: 58   Resp: 14   Temp: 96.9 °F (36.1 °C)   TempSrc: Oral   Weight: 93.4 kg (206 lb)   Height: 172.7 cm (68\")   PainSc:   3     BMI Readings from Last 1 Encounters:   03/17/22 31.32 kg/m²   BMI is above normal parameters. " Recommendations include: exercise counseling    Does the patient have evidence of cognitive impairment? No    Physical Exam  Lab Results   Component Value Date    CHLPL 150 2022    TRIG 157 (H) 2022    HDL 50 2022    LDL 73 2022    VLDL 27 2022            HEALTH RISK ASSESSMENT    Smoking Status:  Social History     Tobacco Use   Smoking Status Former Smoker   • Packs/day: 1.00   • Years: 30.00   • Pack years: 30.00   • Types: Cigarettes   • Quit date:    • Years since quittin.2   Smokeless Tobacco Never Used     Alcohol Consumption:  Social History     Substance and Sexual Activity   Alcohol Use Yes    Comment: RARELY     Fall Risk Screen:    STEFANADI Fall Risk Assessment has not been completed.    Depression Screening:  PHQ-2/PHQ-9 Depression Screening 3/21/2022   Retired PHQ-9 Total Score -   Retired Total Score -   Little Interest or Pleasure in Doing Things 0-->not at all   Feeling Down, Depressed or Hopeless 0-->not at all   PHQ-9: Brief Depression Severity Measure Score 0       Health Habits and Functional and Cognitive Screening:  Functional & Cognitive Status 3/21/2022   Do you have difficulty preparing food and eating? No   Do you have difficulty bathing yourself, getting dressed or grooming yourself? No   Do you have difficulty using the toilet? No   Do you have difficulty moving around from place to place? No   Do you have trouble with steps or getting out of a bed or a chair? No   Current Diet Well Balanced Diet   Dental Exam Up to date   Eye Exam Up to date   Exercise (times per week) 3 times per week   Current Exercises Include Walking   Current Exercise Activities Include -   Do you need help using the phone?  No   Are you deaf or do you have serious difficulty hearing?  No   Do you need help with transportation? No   Do you need help shopping? No   Do you need help preparing meals?  No   Do you need help with housework?  No   Do you need help with laundry? No    Do you need help taking your medications? No   Do you need help managing money? No   Do you ever drive or ride in a car without wearing a seat belt? No   Have you felt unusual stress, anger or loneliness in the last month? No   Who do you live with? Spouse   If you need help, do you have trouble finding someone available to you? Yes   Have you been bothered in the last four weeks by sexual problems? No   Do you have difficulty concentrating, remembering or making decisions? -       Age-appropriate Screening Schedule:  Refer to the list below for future screening recommendations based on patient's age, sex and/or medical conditions. Orders for these recommended tests are listed in the plan section. The patient has been provided with a written plan.    Health Maintenance   Topic Date Due   • LIPID PANEL  03/14/2023   • TDAP/TD VACCINES (2 - Tdap) 05/15/2031   • INFLUENZA VACCINE  Completed   • ZOSTER VACCINE  Completed              Assessment/Plan   CMS Preventative Services Quick Reference  Risk Factors Identified During Encounter  Cardiovascular Disease  The above risks/problems have been discussed with the patient.  Follow up actions/plans if indicated are seen below in the Assessment/Plan Section.  Pertinent information has been shared with the patient in the After Visit Summary.    Diagnoses and all orders for this visit:    1. Medicare annual wellness visit, subsequent (Primary)    2. Hypothyroidism, acquired  -     levothyroxine (Synthroid) 100 MCG tablet; Take 1 tablet by mouth Daily.  Dispense: 90 tablet; Refill: 1    3. Mixed hyperlipidemia  -     atorvastatin (LIPITOR) 40 MG tablet; Take 1 tablet by mouth Daily.  Dispense: 90 tablet; Refill: 1    4. Degeneration of intervertebral disc of lumbosacral region  -     baclofen (LIORESAL) 20 MG tablet; Take 1 tablet by mouth Every Night.  Dispense: 40 tablet; Refill: 1    5. Degenerative disc disease, cervical  -     baclofen (LIORESAL) 20 MG tablet; Take 1  tablet by mouth Every Night.  Dispense: 40 tablet; Refill: 1  -     HYDROcodone-acetaminophen (NORCO) 5-325 MG per tablet; Take 1 tablet by mouth Every 4 (Four) Hours As Needed for Moderate Pain  (Pain).  Dispense: 40 tablet; Refill: 0        Follow Up:   Return in about 6 months (around 9/21/2022) for Recheck.     An After Visit Summary and PPPS were made available to the patient.        I spent 10 minutes caring for Rob on this date of service. This time includes time spent by me in the following activities:preparing for the visit

## 2022-05-31 DIAGNOSIS — M50.30 DEGENERATIVE DISC DISEASE, CERVICAL: Chronic | ICD-10-CM

## 2022-05-31 RX ORDER — HYDROCODONE BITARTRATE AND ACETAMINOPHEN 5; 325 MG/1; MG/1
1 TABLET ORAL EVERY 4 HOURS PRN
Qty: 40 TABLET | Refills: 0 | Status: SHIPPED | OUTPATIENT
Start: 2022-05-31 | End: 2022-08-18

## 2022-07-19 ENCOUNTER — DOCUMENTATION (OUTPATIENT)
Dept: SURGERY | Facility: CLINIC | Age: 72
End: 2022-07-19

## 2022-07-19 PROBLEM — Z96.1 ARTIFICIAL LENS PRESENT: Status: ACTIVE | Noted: 2018-02-21

## 2022-07-19 RX ORDER — BNT162B2 0.23 MG/2.25ML
INJECTION, SUSPENSION INTRAMUSCULAR
COMMUNITY
Start: 2022-04-21 | End: 2022-11-15

## 2022-07-25 ENCOUNTER — TELEPHONE (OUTPATIENT)
Dept: SURGERY | Facility: CLINIC | Age: 72
End: 2022-07-25

## 2022-08-18 ENCOUNTER — OFFICE VISIT (OUTPATIENT)
Dept: FAMILY MEDICINE CLINIC | Facility: CLINIC | Age: 72
End: 2022-08-18

## 2022-08-18 VITALS
RESPIRATION RATE: 16 BRPM | TEMPERATURE: 98.5 F | DIASTOLIC BLOOD PRESSURE: 75 MMHG | HEART RATE: 86 BPM | HEIGHT: 69 IN | WEIGHT: 208 LBS | SYSTOLIC BLOOD PRESSURE: 122 MMHG | BODY MASS INDEX: 30.81 KG/M2

## 2022-08-18 DIAGNOSIS — J30.89 CHRONIC NONSEASONAL ALLERGIC RHINITIS DUE TO POLLEN: ICD-10-CM

## 2022-08-18 DIAGNOSIS — E78.2 MIXED HYPERLIPIDEMIA: Chronic | ICD-10-CM

## 2022-08-18 DIAGNOSIS — M50.30 DEGENERATIVE DISC DISEASE, CERVICAL: Chronic | ICD-10-CM

## 2022-08-18 DIAGNOSIS — E03.9 HYPOTHYROIDISM, ACQUIRED: Chronic | ICD-10-CM

## 2022-08-18 DIAGNOSIS — M51.37 DEGENERATION OF INTERVERTEBRAL DISC OF LUMBOSACRAL REGION: Chronic | ICD-10-CM

## 2022-08-18 PROCEDURE — 99214 OFFICE O/P EST MOD 30 MIN: CPT | Performed by: FAMILY MEDICINE

## 2022-08-18 RX ORDER — BACLOFEN 20 MG/1
20 TABLET ORAL NIGHTLY
Qty: 90 TABLET | Refills: 1 | Status: SHIPPED | OUTPATIENT
Start: 2022-08-18

## 2022-08-18 RX ORDER — LEVOTHYROXINE SODIUM 0.1 MG/1
100 TABLET ORAL DAILY
Qty: 90 TABLET | Refills: 1 | Status: SHIPPED | OUTPATIENT
Start: 2022-08-18

## 2022-08-18 RX ORDER — ATORVASTATIN CALCIUM 40 MG/1
40 TABLET, FILM COATED ORAL DAILY
Qty: 90 TABLET | Refills: 1 | Status: SHIPPED | OUTPATIENT
Start: 2022-08-18

## 2022-08-18 RX ORDER — HYDROCODONE BITARTRATE AND ACETAMINOPHEN 7.5; 325 MG/1; MG/1
1 TABLET ORAL DAILY PRN
Qty: 40 TABLET | Refills: 0 | Status: SHIPPED | OUTPATIENT
Start: 2022-08-18 | End: 2022-10-21

## 2022-08-18 RX ORDER — HYDROCODONE BITARTRATE AND ACETAMINOPHEN 5; 325 MG/1; MG/1
1 TABLET ORAL EVERY 4 HOURS PRN
Qty: 40 TABLET | Refills: 0 | Status: CANCELLED | OUTPATIENT
Start: 2022-08-18

## 2022-08-18 RX ORDER — FLUTICASONE PROPIONATE 50 MCG
2 SPRAY, SUSPENSION (ML) NASAL DAILY
Qty: 48 G | Refills: 3 | Status: SHIPPED | OUTPATIENT
Start: 2022-08-18

## 2022-08-18 NOTE — PROGRESS NOTES
Subjective   Rob Shah is a 71 y.o. male.     History of Present Illness     Chief Complaint:   Chief Complaint   Patient presents with   • Hypothyroidism     Med refill   • Hyperlipidemia   • Arthritis       Rob Shah 71 y.o. male who presents today for Medical Management of the below listed issues. He  has a problem list of   Patient Active Problem List   Diagnosis   • Degeneration of intervertebral disc of lumbosacral region   • ED (erectile dysfunction)   • Hypothyroidism, acquired   • Insomnia   • Nodular prostate without urinary obstruction   • Osteoarthritis   • Hyperlipidemia   • Testicular hypofunction   • Bilateral tinnitus   • Allergic rhinitis due to pollen   • Nausea   • Neck pain   • Pain of both shoulder joints   • Degenerative disc disease, cervical   • History of colon polyps   • Herpes simplex   • Artificial lens present   • Keratoconjunctivitis sicca not specified as Sjogren's   .  Since the last visit, He has overall felt well.  he has been compliant with   Current Outpatient Medications:   •  atorvastatin (LIPITOR) 40 MG tablet, Take 1 tablet by mouth Daily., Disp: 90 tablet, Rfl: 1  •  baclofen (LIORESAL) 20 MG tablet, Take 1 tablet by mouth Every Night., Disp: 90 tablet, Rfl: 1  •  fluticasone (FLONASE) 50 MCG/ACT nasal spray, 2 sprays into the nostril(s) as directed by provider Daily., Disp: 48 g, Rfl: 3  •  levothyroxine (Synthroid) 100 MCG tablet, Take 1 tablet by mouth Daily., Disp: 90 tablet, Rfl: 1  •  HYDROcodone-acetaminophen (Norco) 7.5-325 MG per tablet, Take 1 tablet by mouth Daily As Needed for Moderate Pain ., Disp: 40 tablet, Rfl: 0  •  Multiple Vitamins-Minerals (CENTRUM SILVER 50+MEN PO), Take  by mouth., Disp: , Rfl:   •  ondansetron ODT (ZOFRAN-ODT) 4 MG disintegrating tablet, Place 1 tablet on the tongue Every 8 (Eight) Hours As Needed for Nausea or Vomiting., Disp: 30 tablet, Rfl: 5  •  Pfizer-BioNT COVID-19 Vac-Janice 30 MCG/0.3ML suspension, , Disp: , Rfl:   •   "promethazine (PHENERGAN) 25 MG tablet, Take 1 tablet by mouth Every 6 (Six) Hours As Needed for Nausea or Vomiting., Disp: 12 tablet, Rfl: 0  •  valACYclovir (VALTREX) 1000 MG tablet, TAKE 2 TABLETS EVERY TWELVE HOURS FOR 2 DOSES AS NEEDED, Disp: 40 tablet, Rfl: 3.  He denies medication side effects.    All of the other chronic condition(s) listed above are stable w/o issues.    /75   Pulse 86   Temp 98.5 °F (36.9 °C) (Oral)   Resp 16   Ht 175.3 cm (69\")   Wt 94.3 kg (208 lb)   BMI 30.72 kg/m²     Results for orders placed or performed in visit on 03/11/22   Comprehensive metabolic panel    Specimen: Blood   Result Value Ref Range    Glucose 85 65 - 99 mg/dL    BUN 12 8 - 27 mg/dL    Creatinine 1.25 0.76 - 1.27 mg/dL    EGFR Result 61 >59 mL/min/1.73    BUN/Creatinine Ratio 10 10 - 24    Sodium 146 (H) 134 - 144 mmol/L    Potassium 4.4 3.5 - 5.2 mmol/L    Chloride 107 (H) 96 - 106 mmol/L    Total CO2 22 20 - 29 mmol/L    Calcium 9.2 8.6 - 10.2 mg/dL    Total Protein 6.6 6.0 - 8.5 g/dL    Albumin 4.1 3.7 - 4.7 g/dL    Globulin 2.5 1.5 - 4.5 g/dL    A/G Ratio 1.6 1.2 - 2.2    Total Bilirubin 0.4 0.0 - 1.2 mg/dL    Alkaline Phosphatase 88 44 - 121 IU/L    AST (SGOT) 24 0 - 40 IU/L    ALT (SGPT) 19 0 - 44 IU/L   Lipid panel    Specimen: Blood   Result Value Ref Range    Total Cholesterol 150 100 - 199 mg/dL    Triglycerides 157 (H) 0 - 149 mg/dL    HDL Cholesterol 50 >39 mg/dL    VLDL Cholesterol Faahd 27 5 - 40 mg/dL    LDL Chol Calc (NIH) 73 0 - 99 mg/dL   TSH    Specimen: Blood   Result Value Ref Range    TSH 1.790 0.450 - 4.500 uIU/mL   T4, Free    Specimen: Blood   Result Value Ref Range    Free T4 1.47 0.82 - 1.77 ng/dL   CBC and Differential    Specimen: Blood   Result Value Ref Range    WBC 6.8 3.4 - 10.8 x10E3/uL    RBC 4.59 4.14 - 5.80 x10E6/uL    Hemoglobin 14.3 13.0 - 17.7 g/dL    Hematocrit 42.3 37.5 - 51.0 %    MCV 92 79 - 97 fL    MCH 31.2 26.6 - 33.0 pg    MCHC 33.8 31.5 - 35.7 g/dL    RDW 12.3 " 11.6 - 15.4 %    Platelets 283 150 - 450 x10E3/uL    Neutrophil Rel % 61 Not Estab. %    Lymphocyte Rel % 25 Not Estab. %    Monocyte Rel % 10 Not Estab. %    Eosinophil Rel % 3 Not Estab. %    Basophil Rel % 1 Not Estab. %    Neutrophils Absolute 4.1 1.4 - 7.0 x10E3/uL    Lymphocytes Absolute 1.7 0.7 - 3.1 x10E3/uL    Monocytes Absolute 0.7 0.1 - 0.9 x10E3/uL    Eosinophils Absolute 0.2 0.0 - 0.4 x10E3/uL    Basophils Absolute 0.1 0.0 - 0.2 x10E3/uL    Immature Granulocyte Rel % 0 Not Estab. %    Immature Grans Absolute 0.0 0.0 - 0.1 x10E3/uL             The following portions of the patient's history were reviewed and updated as appropriate: allergies, current medications, past family history, past medical history, past social history, past surgical history, and problem list.    Review of Systems   Constitutional: Negative for activity change, chills and fever.   Respiratory: Negative for cough.    Cardiovascular: Negative for chest pain.   Psychiatric/Behavioral: Negative for dysphoric mood.       Objective   Physical Exam  Constitutional:       General: He is not in acute distress.     Appearance: He is well-developed.   Cardiovascular:      Rate and Rhythm: Normal rate and regular rhythm.   Pulmonary:      Effort: Pulmonary effort is normal.      Breath sounds: Normal breath sounds.   Neurological:      Mental Status: He is alert and oriented to person, place, and time.   Psychiatric:         Behavior: Behavior normal.         Thought Content: Thought content normal.       The patient has read and signed the [facility] Controlled Substance Contract.  I will continue to see patient for regular follow up appointments.  They are well controlled on their medication.  JORGE LUIS has been reviewed by me and is updated every 3 months. The patient is aware of the potential for addiction and dependence.        Diagnoses and all orders for this visit:    1. Mixed hyperlipidemia  -     atorvastatin (LIPITOR) 40 MG tablet;  Take 1 tablet by mouth Daily.  Dispense: 90 tablet; Refill: 1    2. Chronic nonseasonal allergic rhinitis due to pollen  -     fluticasone (FLONASE) 50 MCG/ACT nasal spray; 2 sprays into the nostril(s) as directed by provider Daily.  Dispense: 48 g; Refill: 3    3. Degenerative disc disease, cervical  -     HYDROcodone-acetaminophen (Norco) 7.5-325 MG per tablet; Take 1 tablet by mouth Daily As Needed for Moderate Pain .  Dispense: 40 tablet; Refill: 0  -     baclofen (LIORESAL) 20 MG tablet; Take 1 tablet by mouth Every Night.  Dispense: 90 tablet; Refill: 1    4. Hypothyroidism, acquired  -     levothyroxine (Synthroid) 100 MCG tablet; Take 1 tablet by mouth Daily.  Dispense: 90 tablet; Refill: 1    5. Degeneration of intervertebral disc of lumbosacral region  -     HYDROcodone-acetaminophen (Norco) 7.5-325 MG per tablet; Take 1 tablet by mouth Daily As Needed for Moderate Pain .  Dispense: 40 tablet; Refill: 0  -     baclofen (LIORESAL) 20 MG tablet; Take 1 tablet by mouth Every Night.  Dispense: 90 tablet; Refill: 1

## 2022-09-09 ENCOUNTER — PREP FOR SURGERY (OUTPATIENT)
Dept: OTHER | Facility: HOSPITAL | Age: 72
End: 2022-09-09

## 2022-09-09 DIAGNOSIS — Z86.010 HISTORY OF COLON POLYPS: Primary | ICD-10-CM

## 2022-10-21 RX ORDER — HYDROCODONE BITARTRATE AND ACETAMINOPHEN 5; 325 MG/1; MG/1
1 TABLET ORAL EVERY 6 HOURS PRN
Qty: 40 TABLET | Refills: 0 | Status: SHIPPED | OUTPATIENT
Start: 2022-10-21 | End: 2023-01-18 | Stop reason: SDUPTHER

## 2022-10-24 NOTE — PROGRESS NOTES
"Subjective   Rob Shah is a 72 y.o. male.     CC: Back Pain    History of Present Illness     Pt returns today after sending this message yesterday:    Could you recommend a back doctor that's in our network, I've reinjured my back again and it's not getting any better. I did this last Wednesday and I've been sleeping in my recliner since then. Also going through my Hydrocodone and Baclafine at night.   Thank You      Last MRI 4/21 showed:      There is multilevel   degenerative disease involving the lumbar spine overall similar in   appearance compared to the examination of 07/01/2016 including   multilevel disc desiccation and loss of disc height. There is a small   annular tear posteriorly at L3-L4 which is new.    Pt report this hurt acutely when bent over and standing upright.  Pt has seen Pain Mgmt (Dr Warner) prior.     The following portions of the patient's history were reviewed and updated as appropriate: allergies, current medications, past family history, past medical history, past social history, past surgical history and problem list.    Review of Systems   Constitutional: Negative for activity change, chills and fever.   Respiratory: Negative for cough.    Cardiovascular: Negative for chest pain.   Musculoskeletal: Positive for back pain.   Psychiatric/Behavioral: Negative for dysphoric mood.       /82   Pulse 68   Temp 97.5 °F (36.4 °C) (Oral)   Resp 16   Ht 175.3 cm (69\")   Wt 93 kg (205 lb)   BMI 30.27 kg/m²     Objective   Physical Exam  Constitutional:       General: He is not in acute distress.     Appearance: He is well-developed.   Pulmonary:      Effort: Pulmonary effort is normal.   Musculoskeletal:        Back:       Comments: Tender; negative SLR   Neurological:      Mental Status: He is alert and oriented to person, place, and time.   Psychiatric:         Behavior: Behavior normal.         Thought Content: Thought content normal.         Assessment & Plan   Diagnoses and " all orders for this visit:    1. Strain of lumbar region, initial encounter (Primary)  -     methylPREDNISolone (Medrol) 4 MG dose pack; follow package directions  Dispense: 21 tablet; Refill: 0  -     diclofenac (VOLTAREN) 75 MG EC tablet; Take 1 tablet by mouth 2 (Two) Times a Day.  Dispense: 30 tablet; Refill: 2    PT in 2 weeks if not better.

## 2022-10-25 ENCOUNTER — OFFICE VISIT (OUTPATIENT)
Dept: FAMILY MEDICINE CLINIC | Facility: CLINIC | Age: 72
End: 2022-10-25

## 2022-10-25 VITALS
RESPIRATION RATE: 16 BRPM | SYSTOLIC BLOOD PRESSURE: 146 MMHG | DIASTOLIC BLOOD PRESSURE: 82 MMHG | WEIGHT: 205 LBS | HEART RATE: 68 BPM | TEMPERATURE: 97.5 F | BODY MASS INDEX: 30.36 KG/M2 | HEIGHT: 69 IN

## 2022-10-25 DIAGNOSIS — S39.012A STRAIN OF LUMBAR REGION, INITIAL ENCOUNTER: Primary | ICD-10-CM

## 2022-10-25 PROCEDURE — 99213 OFFICE O/P EST LOW 20 MIN: CPT | Performed by: FAMILY MEDICINE

## 2022-10-25 RX ORDER — DICLOFENAC SODIUM 75 MG/1
75 TABLET, DELAYED RELEASE ORAL 2 TIMES DAILY
Qty: 30 TABLET | Refills: 2 | Status: SHIPPED | OUTPATIENT
Start: 2022-10-25

## 2022-10-25 RX ORDER — METHYLPREDNISOLONE 4 MG/1
TABLET ORAL
Qty: 21 TABLET | Refills: 0 | Status: SHIPPED | OUTPATIENT
Start: 2022-10-25 | End: 2022-11-15

## 2022-11-15 RX ORDER — ASCORBIC ACID 500 MG
500 TABLET ORAL
COMMUNITY

## 2022-11-15 RX ORDER — CHOLECALCIFEROL (VITAMIN D3) 125 MCG
1 CAPSULE ORAL
COMMUNITY

## 2022-11-16 ENCOUNTER — ANESTHESIA EVENT (OUTPATIENT)
Dept: GASTROENTEROLOGY | Facility: HOSPITAL | Age: 72
End: 2022-11-16

## 2022-11-16 ENCOUNTER — ANESTHESIA (OUTPATIENT)
Dept: GASTROENTEROLOGY | Facility: HOSPITAL | Age: 72
End: 2022-11-16

## 2022-11-16 ENCOUNTER — HOSPITAL ENCOUNTER (OUTPATIENT)
Facility: HOSPITAL | Age: 72
Setting detail: HOSPITAL OUTPATIENT SURGERY
Discharge: HOME | End: 2022-11-16
Attending: COLON & RECTAL SURGERY | Admitting: COLON & RECTAL SURGERY

## 2022-11-16 VITALS
BODY MASS INDEX: 30.89 KG/M2 | DIASTOLIC BLOOD PRESSURE: 83 MMHG | HEIGHT: 68 IN | WEIGHT: 203.8 LBS | SYSTOLIC BLOOD PRESSURE: 127 MMHG | RESPIRATION RATE: 18 BRPM | OXYGEN SATURATION: 96 % | HEART RATE: 79 BPM

## 2022-11-16 DIAGNOSIS — Z86.010 HISTORY OF COLON POLYPS: ICD-10-CM

## 2022-11-16 PROCEDURE — 25010000002 PROPOFOL 10 MG/ML EMULSION: Performed by: ANESTHESIOLOGY

## 2022-11-16 PROCEDURE — 88305 TISSUE EXAM BY PATHOLOGIST: CPT | Performed by: COLON & RECTAL SURGERY

## 2022-11-16 RX ORDER — PROPOFOL 10 MG/ML
VIAL (ML) INTRAVENOUS AS NEEDED
Status: DISCONTINUED | OUTPATIENT
Start: 2022-11-16 | End: 2022-11-16 | Stop reason: SURG

## 2022-11-16 RX ORDER — SODIUM CHLORIDE 0.9 % (FLUSH) 0.9 %
10 SYRINGE (ML) INJECTION AS NEEDED
Status: DISCONTINUED | OUTPATIENT
Start: 2022-11-16 | End: 2022-11-16 | Stop reason: HOSPADM

## 2022-11-16 RX ORDER — SODIUM CHLORIDE, SODIUM LACTATE, POTASSIUM CHLORIDE, CALCIUM CHLORIDE 600; 310; 30; 20 MG/100ML; MG/100ML; MG/100ML; MG/100ML
1000 INJECTION, SOLUTION INTRAVENOUS CONTINUOUS
Status: DISCONTINUED | OUTPATIENT
Start: 2022-11-16 | End: 2022-11-16

## 2022-11-16 RX ORDER — PROPOFOL 10 MG/ML
VIAL (ML) INTRAVENOUS CONTINUOUS PRN
Status: DISCONTINUED | OUTPATIENT
Start: 2022-11-16 | End: 2022-11-16 | Stop reason: SURG

## 2022-11-16 RX ORDER — SODIUM CHLORIDE 9 MG/ML
1000 INJECTION, SOLUTION INTRAVENOUS CONTINUOUS
Status: DISCONTINUED | OUTPATIENT
Start: 2022-11-16 | End: 2022-11-16 | Stop reason: HOSPADM

## 2022-11-16 RX ORDER — GLYCOPYRROLATE 0.2 MG/ML
INJECTION INTRAMUSCULAR; INTRAVENOUS AS NEEDED
Status: DISCONTINUED | OUTPATIENT
Start: 2022-11-16 | End: 2022-11-16 | Stop reason: SURG

## 2022-11-16 RX ORDER — LIDOCAINE HYDROCHLORIDE 20 MG/ML
INJECTION, SOLUTION INFILTRATION; PERINEURAL AS NEEDED
Status: DISCONTINUED | OUTPATIENT
Start: 2022-11-16 | End: 2022-11-16 | Stop reason: SURG

## 2022-11-16 RX ADMIN — PROPOFOL 90 MG: 10 INJECTION, EMULSION INTRAVENOUS at 09:05

## 2022-11-16 RX ADMIN — Medication 140 MCG/KG/MIN: at 09:05

## 2022-11-16 RX ADMIN — GLYCOPYRROLATE 0.1 MG: 1 INJECTION INTRAMUSCULAR; INTRAVENOUS at 09:03

## 2022-11-16 RX ADMIN — SODIUM CHLORIDE 1000 ML: 9 INJECTION, SOLUTION INTRAVENOUS at 08:34

## 2022-11-16 RX ADMIN — LIDOCAINE HYDROCHLORIDE 30 MG: 20 INJECTION, SOLUTION INFILTRATION; PERINEURAL at 09:05

## 2022-11-16 NOTE — ANESTHESIA PREPROCEDURE EVALUATION
Anesthesia Evaluation     Patient summary reviewed and Nursing notes reviewed   history of anesthetic complications: PONV  NPO Solid Status: > 8 hours  NPO Liquid Status: > 4 hours           Airway   Mallampati: II  TM distance: >3 FB  Neck ROM: full  No difficulty expected  Dental - normal exam     Pulmonary - normal exam    breath sounds clear to auscultation  (+) sleep apnea,   Cardiovascular - normal exam    Rhythm: regular  Rate: normal    (+) hyperlipidemia,       Neuro/Psych  (+) headaches, psychiatric history,    GI/Hepatic/Renal/Endo    (+) obesity,   renal disease CRI, thyroid problem hypothyroidism    Musculoskeletal     (+) chronic pain, neck pain,   Abdominal   (+) obese,    Substance History      OB/GYN          Other   arthritis,                      Anesthesia Plan    ASA 3     MAC     (PONV, scop patch)    Anesthetic plan, risks, benefits, and alternatives have been provided, discussed and informed consent has been obtained with: patient.    Plan discussed with CRNA.

## 2022-11-16 NOTE — DISCHARGE INSTRUCTIONS
For the next 24 hours patient needs to be with a responsible adult.    For 24 hours DO NOT drive, operate machinery, appliances, drink alcohol, make important decisions or sign legal documents.    Start with a light or bland diet if you are feeling sick to your stomach otherwise advance to regular diet as tolerated.    Follow recommendations on procedure report if provided by your doctor.    Call Dr Sandhu for problems [phone]    Problems may include but not limited to: large amounts of bleeding, trouble breathing, repeated vomiting, severe unrelieved pain, fever or chills.

## 2022-11-16 NOTE — ANESTHESIA POSTPROCEDURE EVALUATION
Patient: Rob Shah    Procedure Summary     Date: 11/16/22 Room / Location:  [facility]    Anesthesia Start: 0900 Anesthesia Stop: 0923    Procedure: COLONOSCOPY to cecum with cold biopsy polypectomy Diagnosis:       History of colon polyps      (History of colon polyps [Z86.010])    Surgeons: Tiffany Sandhu MD Provider: Cristopher Ye MD    Anesthesia Type: MAC ASA Status: 3          Anesthesia Type: MAC    Vitals  No vitals data found for the desired time range.          Post Anesthesia Care and Evaluation    Patient location during evaluation: PHASE II  Patient participation: waiting for patient participation  Level of consciousness: sleepy but conscious  Pain management: adequate    Airway patency: patent    Cardiovascular status: acceptable  Respiratory status: acceptable  Hydration status: acceptable

## 2022-11-16 NOTE — H&P
Rob Shah is a 72 y.o. male  who is referred by No ref. provider found for a colonoscopy. He   has an indications: previous adenomatous polyp.     He denies any change in bowel function, melena, or hematochezia.    Past Medical History:   Diagnosis Date   • Allergic rhinitis     CHRONIC, D/T POLLEN   • Bilateral tinnitus    • BPH (benign prostatic hyperplasia)     FOLLOWED BY DR. ROSE LANZA   • CKD (chronic kidney disease)     STAGE II, FOLLOWED BY DR. MAX NOEL   • DDD (degenerative disc disease), cervical    • DDD (degenerative disc disease), lumbosacral     CHRONIC LUMBAR PAIN   • Hyperlipidemia     MIXED   • Hypothyroidism, acquired    • Liver hemangioma 01/18/2016    FOLLOWED BY DR. MAX NOEL   • Nodular prostate without urinary obstruction     FOLLOWED BY DR. ROSE LANZA   • Osteoarthritis    • Sleep apnea     DOES NOT WEAR CPAP       Past Surgical History:   Procedure Laterality Date   • CARDIAC CATHETERIZATION  2003    D/T CP AND ABNML LABS   • COLONOSCOPY N/A 10/18/2011    ENTIRE COLON WNL, RESCOPE IN 5 YRS, DR.RAYMOND BELLO AT [facility]   • COLONOSCOPY N/A 11/09/2016    3 TUBULAR ADENOMA POLYPS IN CECUM, 2 TUBULAR ADENOMA POLYPS IN ASCENDING, 7 MM TUBULAR ADENOMA POLYP IN HEPATIC FLEXURE, RESCOPE IN 3 YRS, DR. MARCUS BEGUM AT [facility]   • COLONOSCOPY N/A 11/14/2019    3 TUBULAR ADENOMA POLYPS IN TRANSVERSE, RESCOPE IN 3 YRS, DR. MARCUS BEGUM AT [facility]   • ENDOSCOPY     • ENDOSCOPY AND COLONOSCOPY N/A 09/01/2005    NO RECORDS   • HAND SURGERY Right 1961   • HERNIA REPAIR N/A 1978    EPIGASTRIC HERNIA REPAIR   • KNEE ARTHROSCOPY Left 12/02/2020    Procedure: LEFT KNEE ARTHROSCOPY. PARTIAL MEDIAL MENISECTOMY;  Surgeon: Juan Smith MD;  Location: [facility] [place];  Service: Orthopedics;  Laterality: Left;   • LUMBAR EPIDURAL INJECTION N/A 08/11/2016    DR. THOMAS CASEY AT [facility]   • LUMBAR EPIDURAL INJECTION N/A 06/07/2011    DR. SULLY HERNANDEZ AT [facility]   • LUMBAR EPIDURAL INJECTION N/A  01/11/2011    DR. BRENT DUNN AT [facility]   • LUMBAR EPIDURAL INJECTION N/A 10/07/2010    DR. JAY CHAMORRO AT [facility]   • LUMBAR EPIDURAL INJECTION N/A 04/05/2010    DR. FREDDY MEJIA AT [facility]   • LUMBAR EPIDURAL INJECTION N/A 03/24/2010    DR. FREDDY MEJIA AT [facility]   • LUMBAR EPIDURAL INJECTION N/A 08/26/2009    DR. JAY CHAMORRO AT [facility]   • LUMBAR EPIDURAL INJECTION N/A 08/19/2009    DR. BRIAN MELVIN AT [facility]   • LUMBAR EPIDURAL INJECTION N/A 06/28/2007    DR. JAY CHAMORRO AT [facility]   • LUMBAR EPIDURAL INJECTION N/A 07/05/2007    DR. AGUSTIN GONZALES AT [facility]   • LUMBAR EPIDURAL INJECTION N/A 07/12/2007    DR. KENDRA CHANG AT [facility]   • LUMBAR EPIDURAL INJECTION N/A 06/20/2006    DR. LÓPEZ DACOSTA AT [facility]   • LUMBAR EPIDURAL INJECTION N/A 01/26/2006    DR. KENDRA CHANG AT [facility]   • PROSTATE BIOPSY      X2   • VASECTOMY N/A 1972       Medications Prior to Admission   Medication Sig Dispense Refill Last Dose   • ascorbic acid (VITAMIN C) 500 MG tablet Take 1 tablet by mouth Daily With Dinner.   11/15/2022   • atorvastatin (LIPITOR) 40 MG tablet Take 1 tablet by mouth Daily. (Patient taking differently: Take 40 mg by mouth Every Night.) 90 tablet 1 11/15/2022   • baclofen (LIORESAL) 20 MG tablet Take 1 tablet by mouth Every Night. (Patient taking differently: Take 20 mg by mouth As Needed.) 90 tablet 1 Past Month   • Cholecalciferol (Vitamin D3) 50 MCG (2000 UT) tablet Take 1 tablet by mouth Daily With Dinner.   11/15/2022   • fluticasone (FLONASE) 50 MCG/ACT nasal spray 2 sprays into the nostril(s) as directed by provider Daily. 48 g 3 11/16/2022   • levothyroxine (Synthroid) 100 MCG tablet Take 1 tablet by mouth Daily. 90 tablet 1 11/15/2022   • Zinc 50 MG capsule Take 1 tablet by mouth Daily With Dinner.   11/15/2022   • diclofenac (VOLTAREN) 75 MG EC tablet Take 1 tablet by mouth 2 (Two) Times a Day. 30 tablet 2 More than a month   • HYDROcodone-acetaminophen (Norco) 5-325 MG per tablet Take 1 tablet by mouth Every 6 (Six) Hours As Needed for  Moderate Pain. 40 tablet 0 More than a month   • ondansetron ODT (ZOFRAN-ODT) 4 MG disintegrating tablet Place 1 tablet on the tongue Every 8 (Eight) Hours As Needed for Nausea or Vomiting. 30 tablet 5 More than a month   • valACYclovir (VALTREX) 1000 MG tablet TAKE 2 TABLETS EVERY TWELVE HOURS FOR 2 DOSES AS NEEDED (Patient taking differently: As Needed. TAKE 2 TABLETS EVERY TWELVE HOURS FOR 2 DOSES AS NEEDED) 40 tablet 3 More than a month       Allergies   Allergen Reactions   • Penicillins Unknown - Low Severity     Unsure, childhood       Family History   Problem Relation Age of Onset   • Heart failure Mother    • Kidney disease Mother    • Heart disease Mother    • Cancer Father         BRAIN   • Brain cancer Father    • Colon cancer Paternal Uncle    • Cancer Paternal Uncle    • Malig Hyperthermia Neg Hx        Social History     Socioeconomic History   • Marital status:    • Number of children: 2   • Years of education: 12TH GRADE   Tobacco Use   • Smoking status: Former     Packs/day: 1.00     Years: 30.00     Pack years: 30.00     Types: Cigarettes     Quit date:      Years since quittin.8   • Smokeless tobacco: Never   Vaping Use   • Vaping Use: Never used   Substance and Sexual Activity   • Alcohol use: Yes     Comment: RARELY   • Drug use: No   • Sexual activity: Defer       Review of Systems   Gastrointestinal: Negative for abdominal pain, nausea and vomiting.   All other systems reviewed and are negative.      Vitals:    22 0823   Pulse: 65   Resp: 16   SpO2: 94%         Physical Exam  Constitutional:       Appearance: He is well-developed.   HENT:      Head: Normocephalic and atraumatic.   Eyes:      Pupils: Pupils are equal, round, and reactive to light.   Cardiovascular:      Rate and Rhythm: Regular rhythm.   Pulmonary:      Effort: Pulmonary effort is normal.   Abdominal:      General: There is no distension.      Palpations: Abdomen is soft.   Musculoskeletal:          General: Normal range of motion.   Skin:     General: Skin is warm and dry.   Neurological:      Mental Status: He is alert and oriented to person, place, and time.   Psychiatric:         Thought Content: Thought content normal.         Judgment: Judgment normal.           Assessment & Plan      indications: previous adenomatous polyp         I recommend colonoscopy.  I described risks, benefits of the procedure with the patient including but not limited to bleeding, infection, possibility of perforation and possible polypectomy. All of the patient's questions were answered and they would like to proceed with the above recommendations.

## 2022-11-17 LAB
LAB AP CASE REPORT: NORMAL
PATH REPORT.FINAL DX SPEC: NORMAL
PATH REPORT.GROSS SPEC: NORMAL

## 2022-12-08 DIAGNOSIS — R11.0 NAUSEA: Chronic | ICD-10-CM

## 2022-12-08 RX ORDER — ONDANSETRON 4 MG/1
TABLET, ORALLY DISINTEGRATING ORAL
Qty: 30 TABLET | Refills: 35 | OUTPATIENT
Start: 2022-12-08

## 2022-12-27 NOTE — TELEPHONE ENCOUNTER
From: Rob Shah  To: Rob Travis MD  Sent: 5/14/2021 1:43 PM EDT  Subject: Prescription Question    It appears that on April 14th when I requested some help with my MRI I also asked you to refill my Fluticasoe nasal spray. My mistake was asking for both to be sent to [facility]. My mistake I am so sorry, would you please cancel the order to [facility] and resend the request to [facility]. This was an oversight on my part, I am sorry.  Thank You  Jerry Shah  
249.3

## 2023-01-17 RX ORDER — LEVOTHYROXINE SODIUM 0.1 MG/1
100 TABLET ORAL DAILY
Qty: 90 TABLET | Refills: 1 | Status: CANCELLED | OUTPATIENT
Start: 2023-01-17

## 2023-01-17 RX ORDER — BACLOFEN 20 MG/1
20 TABLET ORAL NIGHTLY
Qty: 90 TABLET | Refills: 1 | Status: CANCELLED | OUTPATIENT
Start: 2023-01-17

## 2023-01-17 RX ORDER — ATORVASTATIN CALCIUM 40 MG/1
40 TABLET, FILM COATED ORAL DAILY
Qty: 90 TABLET | Refills: 1 | Status: CANCELLED | OUTPATIENT
Start: 2023-01-17

## 2023-01-17 NOTE — PROGRESS NOTES
Subjective   Rob Shah is a 72 y.o. male.     History of Present Illness     Chief Complaint:   Chief Complaint   Patient presents with   • Hyperlipidemia     Med refill / no labs / new pharm per pt optum    • Anxiety   • Arthritis       Rob Shah 72 y.o. male who presents today for Medical Management of the below listed issues. He  has a problem list of   Patient Active Problem List   Diagnosis   • Degeneration of intervertebral disc of lumbosacral region   • ED (erectile dysfunction)   • Hypothyroidism, acquired   • Insomnia   • Nodular prostate without urinary obstruction   • Osteoarthritis   • Hyperlipidemia   • Testicular hypofunction   • Bilateral tinnitus   • Allergic rhinitis due to pollen   • Nausea   • Neck pain   • Pain of both shoulder joints   • Degenerative disc disease, cervical   • History of colon polyps   • Herpes simplex   • Artificial lens present   • Keratoconjunctivitis sicca not specified as Sjogren's   .  Since the last visit, He has overall felt well.  he has been compliant with   Current Outpatient Medications:   •  atorvastatin (LIPITOR) 40 MG tablet, Take 1 tablet by mouth Daily. (Patient taking differently: Take 40 mg by mouth Every Night.), Disp: 90 tablet, Rfl: 1  •  baclofen (LIORESAL) 20 MG tablet, Take 1 tablet by mouth Every Night. (Patient taking differently: Take 20 mg by mouth As Needed.), Disp: 90 tablet, Rfl: 1  •  HYDROcodone-acetaminophen (Norco) 5-325 MG per tablet, Take 1 tablet by mouth Every 6 (Six) Hours As Needed for Moderate Pain., Disp: 40 tablet, Rfl: 0  •  levothyroxine (Synthroid) 100 MCG tablet, Take 1 tablet by mouth Daily., Disp: 90 tablet, Rfl: 1  •  ascorbic acid (VITAMIN C) 500 MG tablet, Take 1 tablet by mouth Daily With Dinner., Disp: , Rfl:   •  Cholecalciferol (Vitamin D3) 50 MCG (2000 UT) tablet, Take 1 tablet by mouth Daily With Dinner., Disp: , Rfl:   •  diclofenac (VOLTAREN) 75 MG EC tablet, Take 1 tablet by mouth 2 (Two) Times a Day., Disp: 30  "tablet, Rfl: 2  •  fluticasone (FLONASE) 50 MCG/ACT nasal spray, 2 sprays into the nostril(s) as directed by provider Daily., Disp: 48 g, Rfl: 3  •  ondansetron ODT (ZOFRAN-ODT) 4 MG disintegrating tablet, Place 1 tablet on the tongue Every 8 (Eight) Hours As Needed for Nausea or Vomiting., Disp: 30 tablet, Rfl: 5  •  valACYclovir (VALTREX) 1000 MG tablet, TAKE 2 TABLETS EVERY TWELVE HOURS FOR 2 DOSES AS NEEDED (Patient taking differently: As Needed. TAKE 2 TABLETS EVERY TWELVE HOURS FOR 2 DOSES AS NEEDED), Disp: 40 tablet, Rfl: 3  •  Zinc 50 MG capsule, Take 1 tablet by mouth Daily With Dinner., Disp: , Rfl: .  He denies medication side effects.    All of the other chronic condition(s) listed above are stable w/o issues.    /85   Pulse 66   Temp 97.6 °F (36.4 °C) (Oral)   Resp 14   Ht 175.3 cm (69\")   Wt 92.5 kg (204 lb)   BMI 30.13 kg/m²     Results for orders placed or performed during the hospital encounter of 11/16/22   Tissue Pathology Exam    Specimen: Large Intestine, Sigmoid Colon; Polyp   Result Value Ref Range    Case Report       Surgical Pathology Report                         Case: [id number]                                  Authorizing Provider:  Tiffany Sandhu MD        Collected:           11/16/2022 09:17 AM          Ordering Location:     [facility]  Received:            11/16/2022 10:05 AM                                 [facility]O [facility]IT[facility]                                                                  Pathologist:           Brennan Hsu MD                                                         Specimen:    Large Intestine, Sigmoid Colon, sigmoid colon polyp                                        Final Diagnosis       1. Colon, Sigmoid, Biopsy:   A. Hyperplastic polyp.    nissa/pkm       Gross Description       1. Large Intestine, Sigmoid Colon.  The specimen is received in formalin labeled with the patient's name and further designated 'sigmoid colon biopsy' are " multiple small fragments of gray-tan tissue. The specimen is submitted for embedding as received.                 The following portions of the patient's history were reviewed and updated as appropriate: allergies, current medications, past family history, past medical history, past social history, past surgical history, and problem list.    Review of Systems   Constitutional: Negative for activity change, chills and fever.   Respiratory: Negative for cough.    Cardiovascular: Negative for chest pain.   Psychiatric/Behavioral: Negative for dysphoric mood.       Objective   Physical Exam  Constitutional:       General: He is not in acute distress.     Appearance: He is well-developed.   Cardiovascular:      Rate and Rhythm: Normal rate and regular rhythm.   Pulmonary:      Effort: Pulmonary effort is normal.      Breath sounds: Normal breath sounds.   Neurological:      Mental Status: He is alert and oriented to person, place, and time.   Psychiatric:         Behavior: Behavior normal.         Thought Content: Thought content normal.             Diagnoses and all orders for this visit:    1. Degenerative disc disease, cervical (Primary)  -     HYDROcodone-acetaminophen (Norco) 5-325 MG per tablet; Take 1 tablet by mouth Every 6 (Six) Hours As Needed for Moderate Pain.  Dispense: 40 tablet; Refill: 0    2. Degeneration of intervertebral disc of lumbosacral region  -     HYDROcodone-acetaminophen (Norco) 5-325 MG per tablet; Take 1 tablet by mouth Every 6 (Six) Hours As Needed for Moderate Pain.  Dispense: 40 tablet; Refill: 0    3. Hypothyroidism, acquired  -     T4, Free  -     TSH    4. Mixed hyperlipidemia  -     Lipid Panel

## 2023-01-18 ENCOUNTER — OFFICE VISIT (OUTPATIENT)
Dept: FAMILY MEDICINE CLINIC | Facility: CLINIC | Age: 73
End: 2023-01-18
Payer: MEDICARE

## 2023-01-18 VITALS
TEMPERATURE: 97.6 F | RESPIRATION RATE: 14 BRPM | HEIGHT: 69 IN | DIASTOLIC BLOOD PRESSURE: 85 MMHG | SYSTOLIC BLOOD PRESSURE: 129 MMHG | WEIGHT: 204 LBS | BODY MASS INDEX: 30.21 KG/M2 | HEART RATE: 66 BPM

## 2023-01-18 DIAGNOSIS — E78.2 MIXED HYPERLIPIDEMIA: Chronic | ICD-10-CM

## 2023-01-18 DIAGNOSIS — E03.9 HYPOTHYROIDISM, ACQUIRED: Chronic | ICD-10-CM

## 2023-01-18 DIAGNOSIS — M50.30 DEGENERATIVE DISC DISEASE, CERVICAL: Primary | Chronic | ICD-10-CM

## 2023-01-18 DIAGNOSIS — M51.37 DEGENERATION OF INTERVERTEBRAL DISC OF LUMBOSACRAL REGION: Chronic | ICD-10-CM

## 2023-01-18 PROCEDURE — 99213 OFFICE O/P EST LOW 20 MIN: CPT | Performed by: FAMILY MEDICINE

## 2023-01-18 RX ORDER — HYDROCODONE BITARTRATE AND ACETAMINOPHEN 5; 325 MG/1; MG/1
1 TABLET ORAL EVERY 6 HOURS PRN
Qty: 40 TABLET | Refills: 0 | Status: SHIPPED | OUTPATIENT
Start: 2023-01-18 | End: 2023-01-23 | Stop reason: SDUPTHER

## 2023-01-19 DIAGNOSIS — M50.30 DEGENERATIVE DISC DISEASE, CERVICAL: Chronic | ICD-10-CM

## 2023-01-19 DIAGNOSIS — M51.37 DEGENERATION OF INTERVERTEBRAL DISC OF LUMBOSACRAL REGION: Chronic | ICD-10-CM

## 2023-01-20 RX ORDER — HYDROCODONE BITARTRATE AND ACETAMINOPHEN 5; 325 MG/1; MG/1
1 TABLET ORAL EVERY 6 HOURS PRN
Qty: 40 TABLET | Refills: 0 | OUTPATIENT
Start: 2023-01-20

## 2023-01-23 DIAGNOSIS — M50.30 DEGENERATIVE DISC DISEASE, CERVICAL: Chronic | ICD-10-CM

## 2023-01-23 DIAGNOSIS — M51.37 DEGENERATION OF INTERVERTEBRAL DISC OF LUMBOSACRAL REGION: Chronic | ICD-10-CM

## 2023-01-23 RX ORDER — HYDROCODONE BITARTRATE AND ACETAMINOPHEN 5; 325 MG/1; MG/1
1 TABLET ORAL EVERY 6 HOURS PRN
Qty: 40 TABLET | Refills: 0 | Status: SHIPPED | OUTPATIENT
Start: 2023-01-23

## 2023-04-10 LAB
CHOLEST SERPL-MCNC: 148 MG/DL (ref 0–200)
HDLC SERPL-MCNC: 52 MG/DL (ref 40–60)
LDLC SERPL CALC-MCNC: 77 MG/DL (ref 0–100)
T4 FREE SERPL-MCNC: 1.43 NG/DL (ref 0.93–1.7)
TRIGL SERPL-MCNC: 103 MG/DL (ref 0–150)
TSH SERPL DL<=0.005 MIU/L-ACNC: 6.34 UIU/ML (ref 0.27–4.2)
VLDLC SERPL CALC-MCNC: 19 MG/DL (ref 5–40)

## 2023-04-17 RX ORDER — HYDROCODONE BITARTRATE AND ACETAMINOPHEN 5; 325 MG/1; MG/1
1 TABLET ORAL EVERY 6 HOURS PRN
Qty: 40 TABLET | Refills: 0 | Status: CANCELLED | OUTPATIENT
Start: 2023-04-17

## 2023-04-17 RX ORDER — BACLOFEN 20 MG/1
20 TABLET ORAL NIGHTLY
Qty: 90 TABLET | Refills: 1 | Status: CANCELLED | OUTPATIENT
Start: 2023-04-17

## 2023-04-17 NOTE — PROGRESS NOTES
The ABCs of the Annual Wellness Visit  Subsequent Medicare Wellness Visit    Subjective    Rob Shah is a 72 y.o. male who presents for a Subsequent Medicare Wellness Visit.    The following portions of the patient's history were reviewed and   updated as appropriate: allergies, current medications, past family history, past medical history, past social history, past surgical history and problem list.    Compared to one year ago, the patient feels his physical   health is the same.    Compared to one year ago, the patient feels his mental   health is the same.    Recent Hospitalizations:  He was not admitted to the hospital during the last year.       Current Medical Providers:  Patient Care Team:  Rob Travis MD as PCP - General  Rob Travis MD as PCP - Family Medicine  Demetrius Ramon MD as Consulting Physician (Urology)    Outpatient Medications Prior to Visit   Medication Sig Dispense Refill   • alfuzosin (UROXATRAL) 10 MG 24 hr tablet Take 1 tablet by mouth every night at bedtime.     • ascorbic acid (VITAMIN C) 500 MG tablet Take 1 tablet by mouth Daily With Dinner.     • baclofen (LIORESAL) 20 MG tablet Take 1 tablet by mouth Every Night. (Patient taking differently: Take 1 tablet by mouth As Needed.) 90 tablet 1   • Cholecalciferol (Vitamin D3) 50 MCG (2000 UT) tablet Take 1 tablet by mouth Daily With Dinner.     • diclofenac (VOLTAREN) 75 MG EC tablet Take 1 tablet by mouth 2 (Two) Times a Day. 30 tablet 2   • fluticasone (FLONASE) 50 MCG/ACT nasal spray 2 sprays into the nostril(s) as directed by provider Daily. 48 g 3   • ondansetron ODT (ZOFRAN-ODT) 4 MG disintegrating tablet Place 1 tablet on the tongue Every 8 (Eight) Hours As Needed for Nausea or Vomiting. 30 tablet 5   • sulfamethoxazole-trimethoprim (BACTRIM DS,SEPTRA DS) 800-160 MG per tablet Take 1 tablet by mouth Every 12 (Twelve) Hours.     • valACYclovir (VALTREX) 1000 MG tablet TAKE 2 TABLETS EVERY TWELVE HOURS FOR 2 DOSES AS  NEEDED (Patient taking differently: As Needed. TAKE 2 TABLETS EVERY TWELVE HOURS FOR 2 DOSES AS NEEDED) 40 tablet 3   • Zinc 50 MG capsule Take 1 tablet by mouth Daily With Dinner.     • atorvastatin (LIPITOR) 40 MG tablet Take 1 tablet by mouth Daily. (Patient taking differently: Take 1 tablet by mouth Every Night.) 90 tablet 1   • HYDROcodone-acetaminophen (Norco) 5-325 MG per tablet Take 1 tablet by mouth Every 6 (Six) Hours As Needed for Moderate Pain. 40 tablet 0   • levothyroxine (Synthroid) 100 MCG tablet Take 1 tablet by mouth Daily. 90 tablet 1     No facility-administered medications prior to visit.       Opioid medication/s are on active medication list.  and I have evaluated his active treatment plan and pain score trends (see table).  There were no vitals filed for this visit.  I have reviewed the chart for potential of high risk medication and harmful drug interactions in the elderly.            Aspirin is not on active medication list.  Aspirin use is not indicated based on review of current medical condition/s. Risk of harm outweighs potential benefits.  .    Patient Active Problem List   Diagnosis   • Degeneration of intervertebral disc of lumbosacral region   • ED (erectile dysfunction)   • Hypothyroidism, acquired   • Insomnia   • Nodular prostate without urinary obstruction   • Osteoarthritis   • Hyperlipidemia   • Testicular hypofunction   • Bilateral tinnitus   • Allergic rhinitis due to pollen   • Nausea   • Neck pain   • Pain of both shoulder joints   • Degenerative disc disease, cervical   • History of colon polyps   • Herpes simplex   • Artificial lens present   • Keratoconjunctivitis sicca not specified as Sjogren's     Advance Care Planning   Advance Care Planning     Advance Directive is on file.  ACP discussion was held with the patient during this visit. Patient has an advance directive in EMR which is still valid.      Objective    Vitals:    04/18/23 1053   BP: 115/76   BP Location:  "Left arm   Patient Position: Sitting   Pulse: 66   Resp: 16   Temp: 98.1 °F (36.7 °C)   TempSrc: Oral   SpO2: 99%   Weight: 92.5 kg (204 lb)   Height: 175.3 cm (69\")     Estimated body mass index is 30.13 kg/m² as calculated from the following:    Height as of this encounter: 175.3 cm (69\").    Weight as of this encounter: 92.5 kg (204 lb).    BMI is >= 30 and <35. (Class 1 Obesity). The following options were offered after discussion;: exercise counseling/recommendations and nutrition counseling/recommendations      Does the patient have evidence of cognitive impairment? No    Lab Results   Component Value Date    CHLPL 148 04/10/2023    TRIG 103 04/10/2023    HDL 52 04/10/2023    LDL 77 04/10/2023    VLDL 19 04/10/2023        HEALTH RISK ASSESSMENT    Smoking Status:  Social History     Tobacco Use   Smoking Status Former   • Packs/day: 1.00   • Years: 30.00   • Pack years: 30.00   • Types: Cigarettes   • Quit date:    • Years since quittin.3   Smokeless Tobacco Never     Alcohol Consumption:  Social History     Substance and Sexual Activity   Alcohol Use Yes    Comment: RARELY     Fall Risk Screen:    STEFANADI Fall Risk Assessment has not been completed.    Depression Screenin/18/2023    11:00 AM   PHQ-2/PHQ-9 Depression Screening   Little Interest or Pleasure in Doing Things 0-->not at all   Feeling Down, Depressed or Hopeless 0-->not at all   PHQ-9: Brief Depression Severity Measure Score 0       Health Habits and Functional and Cognitive Screenin/18/2023    11:00 AM   Functional & Cognitive Status   Do you have difficulty preparing food and eating? No   Do you have difficulty bathing yourself, getting dressed or grooming yourself? No   Do you have difficulty using the toilet? No   Do you have difficulty moving around from place to place? No   Do you have trouble with steps or getting out of a bed or a chair? No   Current Diet Unhealthy Diet   Dental Exam Up to date   Eye Exam Up to " date   Exercise (times per week) 3 times per week   Current Exercises Include Walking   Do you need help using the phone?  No   Are you deaf or do you have serious difficulty hearing?  No   Do you need help with transportation? No   Do you need help shopping? No   Do you need help preparing meals?  No   Do you need help with housework?  No   Do you need help with laundry? No   Do you need help taking your medications? No   Do you need help managing money? No   Do you ever drive or ride in a car without wearing a seat belt? No   Have you felt unusual stress, anger or loneliness in the last month? No   Who do you live with? Spouse   If you need help, do you have trouble finding someone available to you? No   Have you been bothered in the last four weeks by sexual problems? No   Do you have difficulty concentrating, remembering or making decisions? No       Age-appropriate Screening Schedule:  Refer to the list below for future screening recommendations based on patient's age, sex and/or medical conditions. Orders for these recommended tests are listed in the plan section. The patient has been provided with a written plan.    Health Maintenance   Topic Date Due   • INFLUENZA VACCINE  08/01/2023   • LIPID PANEL  04/10/2024   • ANNUAL WELLNESS VISIT  04/18/2024   • TDAP/TD VACCINES (2 - Tdap) 05/15/2031   • COLORECTAL CANCER SCREENING  11/16/2032   • COVID-19 Vaccine  Completed   • Pneumococcal Vaccine 65+  Completed   • ZOSTER VACCINE  Completed   • HEPATITIS C SCREENING  Discontinued   • AAA SCREEN (ONE-TIME)  Discontinued                  CMS Preventative Services Quick Reference  Risk Factors Identified During Encounter  None Identified  The above risks/problems have been discussed with the patient.  Pertinent information has been shared with the patient in the After Visit Summary.  An After Visit Summary and PPPS were made available to the patient.    Follow Up:   Next Medicare Wellness visit to be scheduled in 1  "year.       Additional E&M Note during same encounter follows:  Patient has multiple medical problems which are significant and separately identifiable that require additional work above and beyond the Medicare Wellness Visit.      Chief Complaint  Med Refill, Thyroid Problem (Levothyroxine refill ), Hyperlipidemia, and Medicare Wellness-subsequent    Subjective        HPI  Rob Shah is also being seen today for Medication Management.    Pt doing well on the medication(s) w/o SEs, and is due refill today.    Pt wishes to increase his Norco during the Aum[place] months due to activity level.     Review of Systems   Constitutional: Negative for chills and fever.   HENT: Negative for congestion, ear pain and sinus pressure.    Eyes: Negative for pain and visual disturbance.   Respiratory: Negative for cough and shortness of breath.    Cardiovascular: Negative for chest pain.   Gastrointestinal: Negative for abdominal pain.   Genitourinary: Negative for difficulty urinating and dysuria.   Skin: Negative.    Neurological: Negative.    Psychiatric/Behavioral: Negative for dysphoric mood.       Objective   Vital Signs:  /76 (BP Location: Left arm, Patient Position: Sitting)   Pulse 66   Temp 98.1 °F (36.7 °C) (Oral)   Resp 16   Ht 175.3 cm (69\")   Wt 92.5 kg (204 lb)   SpO2 99%   BMI 30.13 kg/m²     Physical Exam  Constitutional:       General: He is not in acute distress.     Appearance: He is well-developed.   Cardiovascular:      Rate and Rhythm: Normal rate and regular rhythm.   Pulmonary:      Effort: Pulmonary effort is normal.      Breath sounds: Normal breath sounds.   Neurological:      Mental Status: He is alert and oriented to person, place, and time.   Psychiatric:         Behavior: Behavior normal.         Thought Content: Thought content normal.      Labs reviewed with pt today during visit. All questions answered.      The following data was reviewed by: Rob Travis MD on 04/18/2023:  Common " labs        4/10/2023    08:35   Common Labs   Total Cholesterol 148     Triglycerides 103     HDL Cholesterol 52     LDL Cholesterol  77                  Assessment and Plan   Diagnoses and all orders for this visit:    1. Medicare annual wellness visit, subsequent (Primary)    2. Mixed hyperlipidemia  -     atorvastatin (LIPITOR) 40 MG tablet; Take 1 tablet by mouth Daily.  Dispense: 90 tablet; Refill: 1  -     Comprehensive Metabolic Panel; Future  -     Lipid Panel; Future    3. Degenerative disc disease, cervical  -     HYDROcodone-acetaminophen (Norco) 7.5-325 MG per tablet; Take 1 tablet by mouth Every 6 (Six) Hours As Needed for Moderate Pain.  Dispense: 40 tablet; Refill: 0    4. Degeneration of intervertebral disc of lumbosacral region  -     HYDROcodone-acetaminophen (Norco) 7.5-325 MG per tablet; Take 1 tablet by mouth Every 6 (Six) Hours As Needed for Moderate Pain.  Dispense: 40 tablet; Refill: 0    5. Hypothyroidism, acquired  -     levothyroxine (Synthroid) 100 MCG tablet; Take 1 tablet by mouth Daily.  Dispense: 90 tablet; Refill: 1  -     TSH; Future  -     T4, Free; Future  -     T3; Future           I spent 15 minutes caring for Rob on this date of service. This time includes time spent by me in the following activities:preparing for the visit, performing a medically appropriate examination and/or evaluation , ordering medications, tests, or procedures and documenting information in the medical record  Follow Up   No follow-ups on file.  Patient was given instructions and counseling regarding his condition or for health maintenance advice. Please see specific information pulled into the AVS if appropriate.

## 2023-04-18 ENCOUNTER — OFFICE VISIT (OUTPATIENT)
Dept: FAMILY MEDICINE CLINIC | Facility: CLINIC | Age: 73
End: 2023-04-18
Payer: MEDICARE

## 2023-04-18 VITALS
DIASTOLIC BLOOD PRESSURE: 76 MMHG | RESPIRATION RATE: 16 BRPM | HEART RATE: 66 BPM | OXYGEN SATURATION: 99 % | BODY MASS INDEX: 30.21 KG/M2 | TEMPERATURE: 98.1 F | WEIGHT: 204 LBS | HEIGHT: 69 IN | SYSTOLIC BLOOD PRESSURE: 115 MMHG

## 2023-04-18 DIAGNOSIS — E78.2 MIXED HYPERLIPIDEMIA: Chronic | ICD-10-CM

## 2023-04-18 DIAGNOSIS — M51.37 DEGENERATION OF INTERVERTEBRAL DISC OF LUMBOSACRAL REGION: Chronic | ICD-10-CM

## 2023-04-18 DIAGNOSIS — E03.9 HYPOTHYROIDISM, ACQUIRED: Chronic | ICD-10-CM

## 2023-04-18 DIAGNOSIS — M50.30 DEGENERATIVE DISC DISEASE, CERVICAL: Chronic | ICD-10-CM

## 2023-04-18 DIAGNOSIS — Z00.00 MEDICARE ANNUAL WELLNESS VISIT, SUBSEQUENT: Primary | ICD-10-CM

## 2023-04-18 RX ORDER — ATORVASTATIN CALCIUM 40 MG/1
40 TABLET, FILM COATED ORAL DAILY
Qty: 90 TABLET | Refills: 1 | Status: SHIPPED | OUTPATIENT
Start: 2023-04-18

## 2023-04-18 RX ORDER — SULFAMETHOXAZOLE AND TRIMETHOPRIM 800; 160 MG/1; MG/1
1 TABLET ORAL EVERY 12 HOURS SCHEDULED
COMMUNITY
Start: 2023-03-22

## 2023-04-18 RX ORDER — HYDROCODONE BITARTRATE AND ACETAMINOPHEN 7.5; 325 MG/1; MG/1
1 TABLET ORAL EVERY 6 HOURS PRN
Qty: 40 TABLET | Refills: 0 | Status: SHIPPED | OUTPATIENT
Start: 2023-04-18

## 2023-04-18 RX ORDER — ALFUZOSIN HYDROCHLORIDE 10 MG/1
10 TABLET, EXTENDED RELEASE ORAL
COMMUNITY
Start: 2023-03-22

## 2023-04-18 RX ORDER — LEVOTHYROXINE SODIUM 0.1 MG/1
100 TABLET ORAL DAILY
Qty: 90 TABLET | Refills: 1 | Status: SHIPPED | OUTPATIENT
Start: 2023-04-18

## 2023-04-18 NOTE — PATIENT INSTRUCTIONS
Medicare Wellness  Personal Prevention Plan of Service     Date of Office Visit:    Encounter Provider:  Rob Travis MD  Place of Service:  [facility]  Patient Name: Rob Shah  :  1950    As part of the Medicare Wellness portion of your visit today, we are providing you with this personalized preventive plan of services (PPPS). This plan is based upon recommendations of the [place] Preventive Services Task Force (USPSTF) and the Advisory Committee on Immunization Practices (ACIP).    This lists the preventive care services that should be considered, and provides dates of when you are due. Items listed as completed are up-to-date and do not require any further intervention.    Health Maintenance   Topic Date Due    INFLUENZA VACCINE  2023    LIPID PANEL  04/10/2024    ANNUAL WELLNESS VISIT  2024    TDAP/TD VACCINES (2 - Tdap) 05/15/2031    COLORECTAL CANCER SCREENING  2032    COVID-19 Vaccine  Completed    Pneumococcal Vaccine 65+  Completed    ZOSTER VACCINE  Completed    HEPATITIS C SCREENING  Discontinued    AAA SCREEN (ONE-TIME)  Discontinued       Orders Placed This Encounter   Procedures    Comprehensive Metabolic Panel     Standing Status:   Future     Standing Expiration Date:   2024     Order Specific Question:   Release to patient     Answer:   Routine Release    Lipid Panel     Standing Status:   Future     Standing Expiration Date:   2024    TSH     Standing Status:   Future     Standing Expiration Date:   2024     Order Specific Question:   Release to patient     Answer:   Routine Release    T4, Free     Standing Status:   Future     Standing Expiration Date:   2024     Order Specific Question:   Release to patient     Answer:   Routine Release    T3     Standing Status:   Future     Standing Expiration Date:   2024     Order Specific Question:   Release to patient     Answer:   Routine Release       Return  in about 6 months (around 10/18/2023) for Recheck.

## 2023-04-24 DIAGNOSIS — M65.30 TRIGGER FINGER, UNSPECIFIED FINGER, UNSPECIFIED LATERALITY: Primary | ICD-10-CM

## 2023-09-07 DIAGNOSIS — E03.9 HYPOTHYROIDISM, ACQUIRED: Chronic | ICD-10-CM

## 2023-09-07 DIAGNOSIS — E78.2 MIXED HYPERLIPIDEMIA: Chronic | ICD-10-CM

## 2023-09-07 RX ORDER — ATORVASTATIN CALCIUM 40 MG/1
40 TABLET, FILM COATED ORAL DAILY
Qty: 90 TABLET | Refills: 3 | OUTPATIENT
Start: 2023-09-07

## 2023-09-07 RX ORDER — LEVOTHYROXINE SODIUM 0.1 MG/1
100 TABLET ORAL DAILY
Qty: 90 TABLET | Refills: 3 | OUTPATIENT
Start: 2023-09-07

## 2023-09-07 NOTE — TELEPHONE ENCOUNTER
Rx Refill Note  Requested Prescriptions     Pending Prescriptions Disp Refills    levothyroxine (SYNTHROID, LEVOTHROID) 100 MCG tablet [Pharmacy Med Name: Levothyroxine Sodium 100 MCG Oral Tablet] 90 tablet 3     Sig: TAKE 1 TABLET BY MOUTH DAILY    atorvastatin (LIPITOR) 40 MG tablet [Pharmacy Med Name: Atorvastatin Calcium 40 MG Oral Tablet] 90 tablet 3     Sig: TAKE 1 TABLET BY MOUTH DAILY      Last office visit with prescribing clinician: 4/18/2023   Last telemedicine visit with prescribing clinician: Visit date not found   Next office visit with prescribing clinician: 10/17/2023          Normal TSH in past 12 months        ALK Phos in past 12 months    ALT in past 12 months    AST in past 12 months

## 2023-10-16 RX ORDER — HYDROCODONE BITARTRATE AND ACETAMINOPHEN 7.5; 325 MG/1; MG/1
1 TABLET ORAL EVERY 6 HOURS PRN
Qty: 20 TABLET | Refills: 0 | Status: CANCELLED | OUTPATIENT
Start: 2023-10-16

## 2023-10-16 NOTE — PROGRESS NOTES
Chief Complaint:   Chief Complaint   Patient presents with    Hyperlipidemia    Hypothyroidism    Arthritis       [facility] rx   Med refill due  / lab results        Rob Shah 72 y.o. male who presents today for Medical Management of the below listed issues. He  has a problem list of   Patient Active Problem List   Diagnosis    Degeneration of intervertebral disc of lumbosacral region    ED (erectile dysfunction)    Hypothyroidism, acquired    Insomnia    Nodular prostate without urinary obstruction    Osteoarthritis    Hyperlipidemia    Testicular hypofunction    Bilateral tinnitus    Allergic rhinitis due to pollen    Nausea    Neck pain    Pain of both shoulder joints    Degenerative disc disease, cervical    History of colon polyps    Herpes simplex    Artificial lens present    Keratoconjunctivitis sicca not specified as Sjogren's   .  Since the last visit, He has overall felt well.  he has been compliant with   Current Outpatient Medications:     atorvastatin (LIPITOR) 40 MG tablet, Take 1 tablet by mouth Daily., Disp: 90 tablet, Rfl: 1    fluticasone (FLONASE) 50 MCG/ACT nasal spray, 2 sprays into the nostril(s) as directed by provider Daily., Disp: 48 g, Rfl: 3    levothyroxine (Synthroid) 100 MCG tablet, Take 1 tablet by mouth Daily., Disp: 90 tablet, Rfl: 1    alfuzosin (UROXATRAL) 10 MG 24 hr tablet, Take 1 tablet by mouth every night at bedtime., Disp: , Rfl:     ascorbic acid (VITAMIN C) 500 MG tablet, Take 1 tablet by mouth Daily With Dinner., Disp: , Rfl:     baclofen (LIORESAL) 20 MG tablet, Take 1 tablet by mouth Every Night. (Patient taking differently: Take 1 tablet by mouth As Needed.), Disp: 90 tablet, Rfl: 1    Cholecalciferol (Vitamin D3) 50 MCG (2000 UT) tablet, Take 1 tablet by mouth Daily With Dinner., Disp: , Rfl:     diclofenac (VOLTAREN) 75 MG EC tablet, Take 1 tablet by mouth 2 (Two) Times a Day., Disp: 30 tablet, Rfl: 2    HYDROcodone-acetaminophen (Norco) 5-325 MG per tablet, Take 1  "tablet by mouth Every 6 (Six) Hours As Needed for Moderate Pain., Disp: 20 tablet, Rfl: 0    ondansetron ODT (ZOFRAN-ODT) 4 MG disintegrating tablet, Place 1 tablet on the tongue Every 8 (Eight) Hours As Needed for Nausea or Vomiting., Disp: 30 tablet, Rfl: 5    valACYclovir (VALTREX) 1000 MG tablet, TAKE 2 TABLETS EVERY TWELVE HOURS FOR 2 DOSES AS NEEDED (Patient taking differently: As Needed. TAKE 2 TABLETS EVERY TWELVE HOURS FOR 2 DOSES AS NEEDED), Disp: 40 tablet, Rfl: 3    Zinc 50 MG capsule, Take 1 tablet by mouth Daily With Dinner., Disp: , Rfl: .  He denies medication side effects.    All of the other chronic condition(s) listed above are stable w/o issues.    /81   Pulse 61   Temp 97.8 °F (36.6 °C) (Oral)   Resp 14   Ht 175.3 cm (69\")   Wt 93.9 kg (207 lb)   SpO2 96%   BMI 30.57 kg/m²     Results for orders placed or performed in visit on 09/17/23   Comprehensive Metabolic Panel    Specimen: Blood   Result Value Ref Range    Glucose 78 70 - 99 mg/dL    BUN 12 8 - 27 mg/dL    Creatinine 1.01 0.76 - 1.27 mg/dL    EGFR Result 78 >59 mL/min/1.73    BUN/Creatinine Ratio 12 10 - 24    Sodium 142 134 - 144 mmol/L    Potassium 4.3 3.5 - 5.2 mmol/L    Chloride 107 (H) 96 - 106 mmol/L    Total CO2 22 20 - 29 mmol/L    Calcium 9.2 8.6 - 10.2 mg/dL    Total Protein 6.4 6.0 - 8.5 g/dL    Albumin 4.2 3.8 - 4.8 g/dL    Globulin 2.2 1.5 - 4.5 g/dL    A/G Ratio 1.9 1.2 - 2.2    Total Bilirubin 0.5 0.0 - 1.2 mg/dL    Alkaline Phosphatase 81 44 - 121 IU/L    AST (SGOT) 31 0 - 40 IU/L    ALT (SGPT) 28 0 - 44 IU/L   Lipid Panel    Specimen: Blood   Result Value Ref Range    Total Cholesterol 146 100 - 199 mg/dL    Triglycerides 117 0 - 149 mg/dL    HDL Cholesterol 49 >39 mg/dL    VLDL Cholesterol F[place]ad 21 5 - 40 mg/dL    LDL Chol Calc (NIH) 76 0 - 99 mg/dL   TSH    Specimen: Blood   Result Value Ref Range    TSH 2.920 0.450 - 4.500 uIU/mL   T4, Free    Specimen: Blood   Result Value Ref Range    Free T4 1.44 0.82 - " 1.77 ng/dL   T3    Specimen: Blood   Result Value Ref Range    T3, Total 100 71 - 180 ng/dL             The following portions of the patient's history were reviewed and updated as appropriate: allergies, current medications, past family history, past medical history, past social history, past surgical history, and problem list.    Review of Systems   Constitutional:  Negative for activity change, chills and fever.   Respiratory:  Negative for cough.    Cardiovascular:  Negative for chest pain.   Psychiatric/Behavioral:  Negative for dysphoric mood.        Objective              Physical Exam  Constitutional:       General: He is not in acute distress.     Appearance: He is well-developed.   Cardiovascular:      Rate and Rhythm: Normal rate and regular rhythm.   Pulmonary:      Effort: Pulmonary effort is normal.      Breath sounds: Normal breath sounds.   Neurological:      Mental Status: He is alert and oriented to person, place, and time.   Psychiatric:         Behavior: Behavior normal.         Thought Content: Thought content normal.     Labs reviewed with pt today during visit. All questions answered.          Diagnoses and all orders for this visit:    1. Mixed hyperlipidemia (Primary)  -     atorvastatin (LIPITOR) 40 MG tablet; Take 1 tablet by mouth Daily.  Dispense: 90 tablet; Refill: 1    2. Chronic nonseasonal allergic rhinitis due to pollen  -     fluticasone (FLONASE) 50 MCG/ACT nasal spray; 2 sprays into the nostril(s) as directed by provider Daily.  Dispense: 48 g; Refill: 3    3. Degenerative disc disease, cervical  -     HYDROcodone-acetaminophen (Norco) 5-325 MG per tablet; Take 1 tablet by mouth Every 6 (Six) Hours As Needed for Moderate Pain.  Dispense: 20 tablet; Refill: 0    4. Degeneration of intervertebral disc of lumbosacral region  -     HYDROcodone-acetaminophen (Norco) 5-325 MG per tablet; Take 1 tablet by mouth Every 6 (Six) Hours As Needed for Moderate Pain.  Dispense: 20 tablet;  Refill: 0    5. Hypothyroidism, acquired  -     levothyroxine (Synthroid) 100 MCG tablet; Take 1 tablet by mouth Daily.  Dispense: 90 tablet; Refill: 1    Healthy diet/exercise/weight management discussed with pt.

## 2023-10-17 ENCOUNTER — OFFICE VISIT (OUTPATIENT)
Dept: FAMILY MEDICINE CLINIC | Facility: CLINIC | Age: 73
End: 2023-10-17
Payer: MEDICARE

## 2023-10-17 VITALS
SYSTOLIC BLOOD PRESSURE: 127 MMHG | RESPIRATION RATE: 14 BRPM | DIASTOLIC BLOOD PRESSURE: 81 MMHG | WEIGHT: 207 LBS | HEART RATE: 61 BPM | TEMPERATURE: 97.8 F | BODY MASS INDEX: 30.66 KG/M2 | OXYGEN SATURATION: 96 % | HEIGHT: 69 IN

## 2023-10-17 DIAGNOSIS — J30.89 CHRONIC NONSEASONAL ALLERGIC RHINITIS DUE TO POLLEN: Chronic | ICD-10-CM

## 2023-10-17 DIAGNOSIS — E03.9 HYPOTHYROIDISM, ACQUIRED: Chronic | ICD-10-CM

## 2023-10-17 DIAGNOSIS — M51.37 DEGENERATION OF INTERVERTEBRAL DISC OF LUMBOSACRAL REGION: Chronic | ICD-10-CM

## 2023-10-17 DIAGNOSIS — E78.2 MIXED HYPERLIPIDEMIA: Primary | Chronic | ICD-10-CM

## 2023-10-17 DIAGNOSIS — M50.30 DEGENERATIVE DISC DISEASE, CERVICAL: Chronic | ICD-10-CM

## 2023-10-17 PROCEDURE — 99214 OFFICE O/P EST MOD 30 MIN: CPT | Performed by: FAMILY MEDICINE

## 2023-10-17 PROCEDURE — 1160F RVW MEDS BY RX/DR IN RCRD: CPT | Performed by: FAMILY MEDICINE

## 2023-10-17 PROCEDURE — 1159F MED LIST DOCD IN RCRD: CPT | Performed by: FAMILY MEDICINE

## 2023-10-17 RX ORDER — LEVOTHYROXINE SODIUM 0.1 MG/1
100 TABLET ORAL DAILY
Qty: 90 TABLET | Refills: 1 | Status: SHIPPED | OUTPATIENT
Start: 2023-10-17

## 2023-10-17 RX ORDER — HYDROCODONE BITARTRATE AND ACETAMINOPHEN 5; 325 MG/1; MG/1
1 TABLET ORAL EVERY 6 HOURS PRN
Qty: 20 TABLET | Refills: 0 | Status: SHIPPED | OUTPATIENT
Start: 2023-10-17

## 2023-10-17 RX ORDER — FLUTICASONE PROPIONATE 50 MCG
2 SPRAY, SUSPENSION (ML) NASAL DAILY
Qty: 48 G | Refills: 3 | Status: SHIPPED | OUTPATIENT
Start: 2023-10-17

## 2023-10-17 RX ORDER — ATORVASTATIN CALCIUM 40 MG/1
40 TABLET, FILM COATED ORAL DAILY
Qty: 90 TABLET | Refills: 1 | Status: SHIPPED | OUTPATIENT
Start: 2023-10-17

## 2024-03-10 DIAGNOSIS — E78.2 MIXED HYPERLIPIDEMIA: Chronic | ICD-10-CM

## 2024-03-10 DIAGNOSIS — E03.9 HYPOTHYROIDISM, ACQUIRED: Chronic | ICD-10-CM

## 2024-03-10 RX ORDER — LEVOTHYROXINE SODIUM 0.1 MG/1
100 TABLET ORAL DAILY
Qty: 90 TABLET | Refills: 3 | OUTPATIENT
Start: 2024-03-10

## 2024-03-10 RX ORDER — ATORVASTATIN CALCIUM 40 MG/1
40 TABLET, FILM COATED ORAL DAILY
Qty: 90 TABLET | Refills: 3 | OUTPATIENT
Start: 2024-03-10

## 2024-04-15 ENCOUNTER — TELEPHONE (OUTPATIENT)
Dept: FAMILY MEDICINE CLINIC | Facility: CLINIC | Age: 74
End: 2024-04-15
Payer: COMMERCIAL

## 2024-04-15 DIAGNOSIS — E78.5 HYPERLIPIDEMIA, UNSPECIFIED HYPERLIPIDEMIA TYPE: ICD-10-CM

## 2024-04-15 DIAGNOSIS — E78.5 HYPERLIPIDEMIA, UNSPECIFIED HYPERLIPIDEMIA TYPE: Primary | ICD-10-CM

## 2024-04-15 DIAGNOSIS — E03.9 HYPOTHYROIDISM, ACQUIRED: ICD-10-CM

## 2024-04-18 LAB
ALBUMIN SERPL-MCNC: 4.1 G/DL (ref 3.8–4.8)
ALBUMIN/GLOB SERPL: 1.8 {RATIO} (ref 1.2–2.2)
ALP SERPL-CCNC: 83 IU/L (ref 44–121)
ALT SERPL-CCNC: 20 IU/L (ref 0–44)
AST SERPL-CCNC: 21 IU/L (ref 0–40)
BASOPHILS # BLD AUTO: 0.1 X10E3/UL (ref 0–0.2)
BASOPHILS NFR BLD AUTO: 1 %
BILIRUB SERPL-MCNC: 0.5 MG/DL (ref 0–1.2)
BUN SERPL-MCNC: 12 MG/DL (ref 8–27)
BUN/CREAT SERPL: 11 (ref 10–24)
CALCIUM SERPL-MCNC: 9.3 MG/DL (ref 8.6–10.2)
CHLORIDE SERPL-SCNC: 107 MMOL/L (ref 96–106)
CHOLEST SERPL-MCNC: 144 MG/DL (ref 100–199)
CO2 SERPL-SCNC: 23 MMOL/L (ref 20–29)
CREAT SERPL-MCNC: 1.1 MG/DL (ref 0.76–1.27)
EGFRCR SERPLBLD CKD-EPI 2021: 70 ML/MIN/1.73
EOSINOPHIL # BLD AUTO: 0.2 X10E3/UL (ref 0–0.4)
EOSINOPHIL NFR BLD AUTO: 3 %
ERYTHROCYTE [DISTWIDTH] IN BLOOD BY AUTOMATED COUNT: 12.3 % (ref 11.6–15.4)
GLOBULIN SER CALC-MCNC: 2.3 G/DL (ref 1.5–4.5)
GLUCOSE SERPL-MCNC: 91 MG/DL (ref 70–99)
HCT VFR BLD AUTO: 40.6 % (ref 37.5–51)
HDLC SERPL-MCNC: 48 MG/DL
HGB BLD-MCNC: 13.4 G/DL (ref 13–17.7)
IMM GRANULOCYTES # BLD AUTO: 0 X10E3/UL (ref 0–0.1)
IMM GRANULOCYTES NFR BLD AUTO: 0 %
LDLC SERPL CALC-MCNC: 77 MG/DL (ref 0–99)
LDLC/HDLC SERPL: 1.6 RATIO (ref 0–3.6)
LYMPHOCYTES # BLD AUTO: 1.9 X10E3/UL (ref 0.7–3.1)
LYMPHOCYTES NFR BLD AUTO: 28 %
MCH RBC QN AUTO: 31 PG (ref 26.6–33)
MCHC RBC AUTO-ENTMCNC: 33 G/DL (ref 31.5–35.7)
MCV RBC AUTO: 94 FL (ref 79–97)
MONOCYTES # BLD AUTO: 0.8 X10E3/UL (ref 0.1–0.9)
MONOCYTES NFR BLD AUTO: 11 %
NEUTROPHILS # BLD AUTO: 3.9 X10E3/UL (ref 1.4–7)
NEUTROPHILS NFR BLD AUTO: 57 %
PLATELET # BLD AUTO: 246 X10E3/UL (ref 150–450)
POTASSIUM SERPL-SCNC: 4.2 MMOL/L (ref 3.5–5.2)
PROT SERPL-MCNC: 6.4 G/DL (ref 6–8.5)
RBC # BLD AUTO: 4.32 X10E6/UL (ref 4.14–5.8)
SODIUM SERPL-SCNC: 143 MMOL/L (ref 134–144)
T3 SERPL-MCNC: 111 NG/DL (ref 71–180)
T4 FREE SERPL-MCNC: 1.58 NG/DL (ref 0.82–1.77)
TRIGL SERPL-MCNC: 105 MG/DL (ref 0–149)
TSH SERPL DL<=0.005 MIU/L-ACNC: 1.83 UIU/ML (ref 0.45–4.5)
VLDLC SERPL CALC-MCNC: 19 MG/DL (ref 5–40)
WBC # BLD AUTO: 6.9 X10E3/UL (ref 3.4–10.8)

## 2024-04-24 RX ORDER — HYDROCODONE BITARTRATE AND ACETAMINOPHEN 5; 325 MG/1; MG/1
1 TABLET ORAL EVERY 6 HOURS PRN
Qty: 20 TABLET | Refills: 0 | Status: CANCELLED | OUTPATIENT
Start: 2024-04-24

## 2024-04-24 NOTE — PROGRESS NOTES
Chief Complaint:   Chief Complaint   Patient presents with    Hyperlipidemia     Multiple pharm     Hypothyroidism    Arthritis     MED REFILL DUE  / LAB RESULTS / CSA UPDATED TODAY        Rob Shah 73 y.o. male who presents today for Medical Management of the below listed issues. He  has a problem list of   Patient Active Problem List   Diagnosis    Degeneration of intervertebral disc of lumbosacral region    ED (erectile dysfunction)    Hypothyroidism, acquired    Insomnia    Nodular prostate without urinary obstruction    Osteoarthritis    Hyperlipidemia    Testicular hypofunction    Bilateral tinnitus    Allergic rhinitis due to pollen    Nausea    Neck pain    Pain of both shoulder joints    Degenerative disc disease, cervical    History of colon polyps    Herpes simplex    Artificial lens present    Keratoconjunctivitis sicca not specified as Sjogren's   .  Since the last visit, He has overall felt well, although patient has had a slow decrease in mental acuity the last 4 to 5 months, reporting difficulty with people's names etc., and reports this is somewhat new for him.  Denies other current symptoms.  he has been compliant with   Current Outpatient Medications:     atorvastatin (LIPITOR) 40 MG tablet, Take 1 tablet by mouth Daily., Disp: 90 tablet, Rfl: 1    fluticasone (FLONASE) 50 MCG/ACT nasal spray, 2 sprays into the nostril(s) as directed by provider Daily., Disp: 48 g, Rfl: 3    levothyroxine (Synthroid) 100 MCG tablet, Take 1 tablet by mouth Daily., Disp: 90 tablet, Rfl: 1    alfuzosin (UROXATRAL) 10 MG 24 hr tablet, Take 1 tablet by mouth every night at bedtime., Disp: , Rfl:     ascorbic acid (VITAMIN C) 500 MG tablet, Take 1 tablet by mouth Daily With Dinner., Disp: , Rfl:     baclofen (LIORESAL) 20 MG tablet, Take 1 tablet by mouth Every Night. (Patient taking differently: Take 1 tablet by mouth As Needed.), Disp: 90 tablet, Rfl: 1    Cholecalciferol (Vitamin D3) 50 MCG (2000 UT) tablet,  "Take 1 tablet by mouth Daily With Dinner., Disp: , Rfl:     diclofenac (VOLTAREN) 75 MG EC tablet, Take 1 tablet by mouth 2 (Two) Times a Day., Disp: 30 tablet, Rfl: 2    HYDROcodone-acetaminophen (Norco) 7.5-325 MG per tablet, Take 1 tablet by mouth Daily As Needed for Moderate Pain., Disp: 40 tablet, Rfl: 0    ondansetron ODT (ZOFRAN-ODT) 4 MG disintegrating tablet, Place 1 tablet on the tongue Every 8 (Eight) Hours As Needed for Nausea or Vomiting., Disp: 30 tablet, Rfl: 5    valACYclovir (VALTREX) 1000 MG tablet, TAKE 2 TABLETS EVERY TWELVE HOURS FOR 2 DOSES AS NEEDED (Patient taking differently: As Needed. TAKE 2 TABLETS EVERY TWELVE HOURS FOR 2 DOSES AS NEEDED), Disp: 40 tablet, Rfl: 3    Zinc 50 MG capsule, Take 1 tablet by mouth Daily With Dinner., Disp: , Rfl: .  He denies medication side effects.    All of the other chronic condition(s) listed above are stable w/o issues.    /83   Pulse 62   Temp 98.1 °F (36.7 °C) (Oral)   Resp 14   Ht 175.3 cm (69\")   Wt 95.3 kg (210 lb)   SpO2 97%   BMI 31.01 kg/m²     Results for orders placed or performed in visit on 04/15/24   Comprehensive metabolic panel    Specimen: Blood   Result Value Ref Range    Glucose 91 70 - 99 mg/dL    BUN 12 8 - 27 mg/dL    Creatinine 1.10 0.76 - 1.27 mg/dL    EGFR Result 70 >59 mL/min/1.73    BUN/Creatinine Ratio 11 10 - 24    Sodium 143 134 - 144 mmol/L    Potassium 4.2 3.5 - 5.2 mmol/L    Chloride 107 (H) 96 - 106 mmol/L    Total CO2 23 20 - 29 mmol/L    Calcium 9.3 8.6 - 10.2 mg/dL    Total Protein 6.4 6.0 - 8.5 g/dL    Albumin 4.1 3.8 - 4.8 g/dL    Globulin 2.3 1.5 - 4.5 g/dL    A/G Ratio 1.8 1.2 - 2.2    Total Bilirubin 0.5 0.0 - 1.2 mg/dL    Alkaline Phosphatase 83 44 - 121 IU/L    AST (SGOT) 21 0 - 40 IU/L    ALT (SGPT) 20 0 - 44 IU/L   Lipid Panel With LDL/HDL Ratio    Specimen: Blood   Result Value Ref Range    Total Cholesterol 144 100 - 199 mg/dL    Triglycerides 105 0 - 149 mg/dL    HDL Cholesterol 48 >39 mg/dL    " VLDL Cholesterol [place] 19 5 - 40 mg/dL    LDL Chol Calc ([facility]) 77 0 - 99 mg/dL    LDL/HDL RATIO 1.6 0.0 - 3.6 ratio   TSH    Specimen: Blood   Result Value Ref Range    TSH 1.830 0.450 - 4.500 uIU/mL   T4, free    Specimen: Blood   Result Value Ref Range    Free T4 1.58 0.82 - 1.77 ng/dL   T3    Specimen: Blood   Result Value Ref Range    T3, Total 111 71 - 180 ng/dL   CBC and Differential    Specimen: Blood   Result Value Ref Range    WBC 6.9 3.4 - 10.8 x10E3/uL    RBC 4.32 4.14 - 5.80 x10E6/uL    Hemoglobin 13.4 13.0 - 17.7 g/dL    Hematocrit 40.6 37.5 - 51.0 %    MCV 94 79 - 97 fL    MCH 31.0 26.6 - 33.0 pg    MCHC 33.0 31.5 - 35.7 g/dL    RDW 12.3 11.6 - 15.4 %    Platelets 246 150 - 450 x10E3/uL    Neutrophil Rel % 57 Not Estab. %    Lymphocyte Rel % 28 Not Estab. %    Monocyte Rel % 11 Not Estab. %    Eosinophil Rel % 3 Not Estab. %    Basophil Rel % 1 Not Estab. %    Neutrophils Absolute 3.9 1.4 - 7.0 x10E3/uL    Lymphocytes Absolute 1.9 0.7 - 3.1 x10E3/uL    Monocytes Absolute 0.8 0.1 - 0.9 x10E3/uL    Eosinophils Absolute 0.2 0.0 - 0.4 x10E3/uL    Basophils Absolute 0.1 0.0 - 0.2 x10E3/uL    Immature Granulocyte Rel % 0 Not Estab. %    Immature Grans Absolute 0.0 0.0 - 0.1 x10E3/uL             The following portions of the patient's history were reviewed and updated as appropriate: allergies, current medications, past family history, past medical history, past social history, past surgical history, and problem list.    Review of Systems   Constitutional:  Negative for activity change, chills and fever.   HENT:  Positive for postnasal drip, rhinorrhea and sore throat. Negative for ear pain.    Respiratory:  Positive for cough. Negative for shortness of breath and wheezing.    Cardiovascular:  Negative for chest pain.   Musculoskeletal:  Positive for myalgias.   Skin:  Negative for rash.   Neurological:  Positive for headaches.   Psychiatric/Behavioral:  Negative for dysphoric mood.        Objective     BMI is  >= 30 and <35. (Class 1 Obesity). The following options were offered after discussion;: exercise counseling/recommendations and nutrition counseling/recommendations        Physical Exam  Vitals and nursing note reviewed.   Constitutional:       General: He is not in acute distress.     Appearance: He is well-developed.   Cardiovascular:      Rate and Rhythm: Normal rate and regular rhythm.   Pulmonary:      Effort: Pulmonary effort is normal.      Breath sounds: Normal breath sounds.   Neurological:      Mental Status: He is alert and oriented to person, place, and time.   Psychiatric:         Behavior: Behavior normal.         Thought Content: Thought content normal.     Labs reviewed with pt today during visit. All questions answered.          Diagnoses and all orders for this visit:    1. Mixed hyperlipidemia (Primary)  -     atorvastatin (LIPITOR) 40 MG tablet; Take 1 tablet by mouth Daily.  Dispense: 90 tablet; Refill: 1    2. Hypothyroidism, acquired  -     levothyroxine (Synthroid) 100 MCG tablet; Take 1 tablet by mouth Daily.  Dispense: 90 tablet; Refill: 1    3. Degenerative disc disease, cervical  -     HYDROcodone-acetaminophen (Norco) 7.5-325 MG per tablet; Take 1 tablet by mouth Daily As Needed for Moderate Pain.  Dispense: 40 tablet; Refill: 0    4. Degeneration of intervertebral disc of lumbosacral region    5. Medication management  -     ToxAssure Flex 15, Ur -    6. Memory changes  -     Ambulatory Referral to Neuropsychology    7. Chronic nonseasonal allergic rhinitis due to pollen  -     fluticasone (FLONASE) 50 MCG/ACT nasal spray; 2 sprays into the nostril(s) as directed by provider Daily.  Dispense: 48 g; Refill: 3

## 2024-04-25 ENCOUNTER — OFFICE VISIT (OUTPATIENT)
Dept: FAMILY MEDICINE CLINIC | Facility: CLINIC | Age: 74
End: 2024-04-25
Payer: COMMERCIAL

## 2024-04-25 VITALS
DIASTOLIC BLOOD PRESSURE: 83 MMHG | HEIGHT: 69 IN | BODY MASS INDEX: 31.1 KG/M2 | SYSTOLIC BLOOD PRESSURE: 138 MMHG | HEART RATE: 62 BPM | OXYGEN SATURATION: 97 % | TEMPERATURE: 98.1 F | WEIGHT: 210 LBS | RESPIRATION RATE: 14 BRPM

## 2024-04-25 DIAGNOSIS — R41.3 MEMORY CHANGES: ICD-10-CM

## 2024-04-25 DIAGNOSIS — M50.30 DEGENERATIVE DISC DISEASE, CERVICAL: Chronic | ICD-10-CM

## 2024-04-25 DIAGNOSIS — E03.9 HYPOTHYROIDISM, ACQUIRED: Chronic | ICD-10-CM

## 2024-04-25 DIAGNOSIS — E78.2 MIXED HYPERLIPIDEMIA: Primary | Chronic | ICD-10-CM

## 2024-04-25 DIAGNOSIS — Z79.899 MEDICATION MANAGEMENT: ICD-10-CM

## 2024-04-25 DIAGNOSIS — M51.37 DEGENERATION OF INTERVERTEBRAL DISC OF LUMBOSACRAL REGION: Chronic | ICD-10-CM

## 2024-04-25 DIAGNOSIS — J30.89 CHRONIC NONSEASONAL ALLERGIC RHINITIS DUE TO POLLEN: Chronic | ICD-10-CM

## 2024-04-25 PROCEDURE — 99214 OFFICE O/P EST MOD 30 MIN: CPT | Performed by: FAMILY MEDICINE

## 2024-04-25 RX ORDER — HYDROCODONE BITARTRATE AND ACETAMINOPHEN 7.5; 325 MG/1; MG/1
1 TABLET ORAL DAILY PRN
Qty: 40 TABLET | Refills: 0 | Status: SHIPPED | OUTPATIENT
Start: 2024-04-25

## 2024-04-25 RX ORDER — LEVOTHYROXINE SODIUM 0.1 MG/1
100 TABLET ORAL DAILY
Qty: 90 TABLET | Refills: 1 | Status: SHIPPED | OUTPATIENT
Start: 2024-04-25

## 2024-04-25 RX ORDER — FLUTICASONE PROPIONATE 50 MCG
2 SPRAY, SUSPENSION (ML) NASAL DAILY
Qty: 48 G | Refills: 3 | Status: SHIPPED | OUTPATIENT
Start: 2024-04-25

## 2024-04-25 RX ORDER — ATORVASTATIN CALCIUM 40 MG/1
40 TABLET, FILM COATED ORAL DAILY
Qty: 90 TABLET | Refills: 1 | Status: SHIPPED | OUTPATIENT
Start: 2024-04-25

## 2024-04-26 ENCOUNTER — TELEPHONE (OUTPATIENT)
Dept: FAMILY MEDICINE CLINIC | Facility: CLINIC | Age: 74
End: 2024-04-26
Payer: COMMERCIAL

## 2024-04-26 NOTE — TELEPHONE ENCOUNTER
Spoke with the patient and the pharmacy  The insurance company only wants to pay for 7 tablets. The pharmacy stated that the patient could use GoodRx coupon to bring the cost down for self pay. I instructed patient that have looked up the GoodRx coupon and provided to the pharmacy and the out of cost pay will be 16.03 patient stated that was feasible to pay.

## 2024-04-26 NOTE — TELEPHONE ENCOUNTER
Caller: Erin Shah    Relationship: Emergency Contact    Best call back number: [phone]    Which medication are you concerned about:     HYDROcodone-acetaminophen (Norco) 7.5-325 MG per tablet       What are your concerns: PATIENT'S SPOUSE STATES THAT THIS MEDICATION WAS DENIED DUE TO THE MEDICATION HAVE 325 TABLETS. PATIENT NEEDS THIS TO BE RESENT WITH THE RIGHT AMOUNT TO   [id number] [place], [place] - [address] [address] [facility] AT [facility] - [phone] [place] [address] [place] [address] [phone]   PLEASE CANCEL OTHER ORDER AND INFORM PATIENT WHEN THE CORRECT ORDER HAS BEEN SENT.

## 2024-04-29 DIAGNOSIS — R11.0 NAUSEA: Chronic | ICD-10-CM

## 2024-04-29 RX ORDER — ONDANSETRON 4 MG/1
4 TABLET, ORALLY DISINTEGRATING ORAL EVERY 8 HOURS PRN
Qty: 30 TABLET | Refills: 5 | Status: SHIPPED | OUTPATIENT
Start: 2024-04-29

## 2024-04-30 LAB
1OH-MIDAZOLAM UR QL SCN: NOT DETECTED NG/MG CREAT
6MAM UR QL SCN: NEGATIVE NG/ML
7AMINOCLONAZEPAM/CREAT UR: NOT DETECTED NG/MG CREAT
A-OH ALPRAZ/CREAT UR: NOT DETECTED NG/MG CREAT
A-OH-TRIAZOLAM/CREAT UR CFM: NOT DETECTED NG/MG CREAT
ALPRAZ/CREAT UR CFM: NOT DETECTED NG/MG CREAT
AMPHETAMINES UR QL SCN: NEGATIVE NG/ML
BARBITURATES UR QL SCN: NEGATIVE NG/ML
BENZODIAZ SCN METH UR: NEGATIVE
BUPRENORPHINE UR QL SCN: NEGATIVE
BUPRENORPHINE/CREAT UR: NOT DETECTED NG/MG CREAT
CANNABINOIDS UR QL SCN: NEGATIVE NG/ML
CLONAZEPAM/CREAT UR CFM: NOT DETECTED NG/MG CREAT
COCAINE+BZE UR QL SCN: NEGATIVE NG/ML
CREAT UR-MCNC: 37 MG/DL
DESALKYLFLURAZ/CREAT UR: NOT DETECTED NG/MG CREAT
DIAZEPAM/CREAT UR: NOT DETECTED NG/MG CREAT
ETHANOL UR QL SCN: NEGATIVE G/DL
FENTANYL CTO UR SCN-MCNC: NEGATIVE NG/ML
FENTANYL/CREAT UR: NOT DETECTED NG/MG CREAT
FLUNITRAZEPAM UR QL SCN: NOT DETECTED NG/MG CREAT
LORAZEPAM/CREAT UR: NOT DETECTED NG/MG CREAT
METHADONE UR QL SCN: NEGATIVE NG/ML
METHADONE+METAB UR QL SCN: NEGATIVE NG/ML
MIDAZOLAM/CREAT UR CFM: NOT DETECTED NG/MG CREAT
NORBUPRENORPHINE/CREAT UR: NOT DETECTED NG/MG CREAT
NORDIAZEPAM/CREAT UR: NOT DETECTED NG/MG CREAT
NORFENTANYL/CREAT UR: NOT DETECTED NG/MG CREAT
NORFLUNITRAZEPAM UR-MCNC: NOT DETECTED NG/MG CREAT
OPIATES UR SCN-MCNC: NEGATIVE NG/ML
OXAZEPAM/CREAT UR: NOT DETECTED NG/MG CREAT
OXYCODONE CTO UR SCN-MCNC: NEGATIVE NG/ML
PCP UR QL SCN: NEGATIVE NG/ML
PRESCRIBED MEDICATIONS: NORMAL
TAPENTADOL CTO UR SCN-MCNC: NEGATIVE NG/ML
TEMAZEPAM/CREAT UR: NOT DETECTED NG/MG CREAT
TRAMADOL UR QL SCN: NEGATIVE NG/ML

## 2024-09-23 ENCOUNTER — APPOINTMENT (OUTPATIENT)
Dept: CT IMAGING | Facility: HOSPITAL | Age: 74
End: 2024-09-23
Payer: MEDICARE

## 2024-09-23 ENCOUNTER — APPOINTMENT (OUTPATIENT)
Dept: MRI IMAGING | Facility: HOSPITAL | Age: 74
End: 2024-09-23
Payer: MEDICARE

## 2024-09-23 ENCOUNTER — HOSPITAL ENCOUNTER (OUTPATIENT)
Facility: HOSPITAL | Age: 74
Setting detail: OBSERVATION
Discharge: HOME | End: 2024-09-24
Attending: EMERGENCY MEDICINE | Admitting: EMERGENCY MEDICINE
Payer: MEDICARE

## 2024-09-23 DIAGNOSIS — M54.50 ACUTE MIDLINE LOW BACK PAIN WITHOUT SCIATICA: Primary | ICD-10-CM

## 2024-09-23 LAB
ALBUMIN SERPL-MCNC: 3.9 G/DL (ref 3.5–5.2)
ALBUMIN/GLOB SERPL: 1.3 G/DL
ALP SERPL-CCNC: 86 U/L (ref 39–117)
ALT SERPL W P-5'-P-CCNC: 20 U/L (ref 1–41)
ANION GAP SERPL CALCULATED.3IONS-SCNC: 9.7 MMOL/L (ref 5–15)
AST SERPL-CCNC: 23 U/L (ref 1–40)
BASOPHILS # BLD AUTO: 0.04 10*3/MM3 (ref 0–0.2)
BASOPHILS NFR BLD AUTO: 0.5 % (ref 0–1.5)
BILIRUB SERPL-MCNC: 0.4 MG/DL (ref 0–1.2)
BUN SERPL-MCNC: 14 MG/DL (ref 8–23)
BUN/CREAT SERPL: 12.8 (ref 7–25)
CALCIUM SPEC-SCNC: 9.3 MG/DL (ref 8.6–10.5)
CHLORIDE SERPL-SCNC: 104 MMOL/L (ref 98–107)
CO2 SERPL-SCNC: 27.3 MMOL/L (ref 22–29)
CREAT SERPL-MCNC: 1.09 MG/DL (ref 0.76–1.27)
DEPRECATED RDW RBC AUTO: 41.5 FL (ref 37–54)
EGFRCR SERPLBLD CKD-EPI 2021: 70.8 ML/MIN/1.73
EOSINOPHIL # BLD AUTO: 0.15 10*3/MM3 (ref 0–0.4)
EOSINOPHIL NFR BLD AUTO: 2 % (ref 0.3–6.2)
ERYTHROCYTE [DISTWIDTH] IN BLOOD BY AUTOMATED COUNT: 12 % (ref 12.3–15.4)
GLOBULIN UR ELPH-MCNC: 3.1 GM/DL
GLUCOSE SERPL-MCNC: 104 MG/DL (ref 65–99)
HCT VFR BLD AUTO: 41 % (ref 37.5–51)
HGB BLD-MCNC: 14 G/DL (ref 13–17.7)
IMM GRANULOCYTES # BLD AUTO: 0.02 10*3/MM3 (ref 0–0.05)
IMM GRANULOCYTES NFR BLD AUTO: 0.3 % (ref 0–0.5)
LYMPHOCYTES # BLD AUTO: 1.56 10*3/MM3 (ref 0.7–3.1)
LYMPHOCYTES NFR BLD AUTO: 20.5 % (ref 19.6–45.3)
MCH RBC QN AUTO: 31.9 PG (ref 26.6–33)
MCHC RBC AUTO-ENTMCNC: 34.1 G/DL (ref 31.5–35.7)
MCV RBC AUTO: 93.4 FL (ref 79–97)
MONOCYTES # BLD AUTO: 0.84 10*3/MM3 (ref 0.1–0.9)
MONOCYTES NFR BLD AUTO: 11 % (ref 5–12)
NEUTROPHILS NFR BLD AUTO: 5.01 10*3/MM3 (ref 1.7–7)
NEUTROPHILS NFR BLD AUTO: 65.7 % (ref 42.7–76)
NRBC BLD AUTO-RTO: 0 /100 WBC (ref 0–0.2)
PLATELET # BLD AUTO: 263 10*3/MM3 (ref 140–450)
PMV BLD AUTO: 9.3 FL (ref 6–12)
POTASSIUM SERPL-SCNC: 4.1 MMOL/L (ref 3.5–5.2)
PROT SERPL-MCNC: 7 G/DL (ref 6–8.5)
RBC # BLD AUTO: 4.39 10*6/MM3 (ref 4.14–5.8)
SODIUM SERPL-SCNC: 141 MMOL/L (ref 136–145)
WBC NRBC COR # BLD AUTO: 7.62 10*3/MM3 (ref 3.4–10.8)

## 2024-09-23 PROCEDURE — G0378 HOSPITAL OBSERVATION PER HR: HCPCS

## 2024-09-23 PROCEDURE — 25010000002 DEXAMETHASONE PER 1 MG: Performed by: PHYSICIAN ASSISTANT

## 2024-09-23 PROCEDURE — 99285 EMERGENCY DEPT VISIT HI MDM: CPT

## 2024-09-23 PROCEDURE — 96374 THER/PROPH/DIAG INJ IV PUSH: CPT

## 2024-09-23 PROCEDURE — 72131 CT LUMBAR SPINE W/O DYE: CPT

## 2024-09-23 PROCEDURE — 63710000001 ONDANSETRON ODT 4 MG TABLET DISPERSIBLE: Performed by: PHYSICIAN ASSISTANT

## 2024-09-23 PROCEDURE — 80053 COMPREHEN METABOLIC PANEL: CPT | Performed by: PHYSICIAN ASSISTANT

## 2024-09-23 PROCEDURE — 85025 COMPLETE CBC W/AUTO DIFF WBC: CPT | Performed by: PHYSICIAN ASSISTANT

## 2024-09-23 PROCEDURE — 96375 TX/PRO/DX INJ NEW DRUG ADDON: CPT

## 2024-09-23 PROCEDURE — 72148 MRI LUMBAR SPINE W/O DYE: CPT

## 2024-09-23 PROCEDURE — 25010000002 KETOROLAC TROMETHAMINE PER 15 MG: Performed by: NURSE PRACTITIONER

## 2024-09-23 RX ORDER — ONDANSETRON 4 MG/1
4 TABLET, ORALLY DISINTEGRATING ORAL ONCE
Status: COMPLETED | OUTPATIENT
Start: 2024-09-23 | End: 2024-09-23

## 2024-09-23 RX ORDER — OXYCODONE AND ACETAMINOPHEN 5; 325 MG/1; MG/1
1 TABLET ORAL EVERY 4 HOURS PRN
Status: DISCONTINUED | OUTPATIENT
Start: 2024-09-23 | End: 2024-09-24 | Stop reason: HOSPADM

## 2024-09-23 RX ORDER — ASCORBIC ACID 500 MG
500 TABLET ORAL
Status: CANCELLED | OUTPATIENT
Start: 2024-09-23

## 2024-09-23 RX ORDER — BISACODYL 5 MG/1
5 TABLET, DELAYED RELEASE ORAL DAILY PRN
Status: DISCONTINUED | OUTPATIENT
Start: 2024-09-23 | End: 2024-09-24 | Stop reason: HOSPADM

## 2024-09-23 RX ORDER — LEVOTHYROXINE SODIUM 100 UG/1
100 TABLET ORAL DAILY
Status: CANCELLED | OUTPATIENT
Start: 2024-09-23

## 2024-09-23 RX ORDER — DEXAMETHASONE SODIUM PHOSPHATE 4 MG/ML
4 INJECTION, SOLUTION INTRA-ARTICULAR; INTRALESIONAL; INTRAMUSCULAR; INTRAVENOUS; SOFT TISSUE ONCE
Status: COMPLETED | OUTPATIENT
Start: 2024-09-23 | End: 2024-09-23

## 2024-09-23 RX ORDER — METHOCARBAMOL 750 MG/1
750 TABLET, FILM COATED ORAL 4 TIMES DAILY
Status: DISCONTINUED | OUTPATIENT
Start: 2024-09-23 | End: 2024-09-24 | Stop reason: HOSPADM

## 2024-09-23 RX ORDER — HYDROCODONE BITARTRATE AND ACETAMINOPHEN 5; 325 MG/1; MG/1
1 TABLET ORAL ONCE
Status: COMPLETED | OUTPATIENT
Start: 2024-09-23 | End: 2024-09-23

## 2024-09-23 RX ORDER — ONDANSETRON 4 MG/1
4 TABLET, ORALLY DISINTEGRATING ORAL EVERY 8 HOURS PRN
Status: CANCELLED | OUTPATIENT
Start: 2024-09-23

## 2024-09-23 RX ORDER — TAMSULOSIN HYDROCHLORIDE 0.4 MG/1
0.4 CAPSULE ORAL NIGHTLY
Status: CANCELLED | OUTPATIENT
Start: 2024-09-23

## 2024-09-23 RX ORDER — DIAZEPAM 5 MG
5 TABLET ORAL ONCE AS NEEDED
Status: COMPLETED | OUTPATIENT
Start: 2024-09-23 | End: 2024-09-23

## 2024-09-23 RX ORDER — SODIUM CHLORIDE 0.9 % (FLUSH) 0.9 %
10 SYRINGE (ML) INJECTION AS NEEDED
Status: DISCONTINUED | OUTPATIENT
Start: 2024-09-23 | End: 2024-09-24 | Stop reason: HOSPADM

## 2024-09-23 RX ORDER — SODIUM CHLORIDE 0.9 % (FLUSH) 0.9 %
10 SYRINGE (ML) INJECTION EVERY 12 HOURS SCHEDULED
Status: DISCONTINUED | OUTPATIENT
Start: 2024-09-23 | End: 2024-09-24 | Stop reason: HOSPADM

## 2024-09-23 RX ORDER — KETOROLAC TROMETHAMINE 15 MG/ML
15 INJECTION, SOLUTION INTRAMUSCULAR; INTRAVENOUS EVERY 6 HOURS PRN
Status: DISCONTINUED | OUTPATIENT
Start: 2024-09-23 | End: 2024-09-24 | Stop reason: HOSPADM

## 2024-09-23 RX ORDER — AMOXICILLIN 250 MG
2 CAPSULE ORAL 2 TIMES DAILY
Status: DISCONTINUED | OUTPATIENT
Start: 2024-09-23 | End: 2024-09-24 | Stop reason: HOSPADM

## 2024-09-23 RX ORDER — BISACODYL 10 MG
10 SUPPOSITORY, RECTAL RECTAL DAILY PRN
Status: DISCONTINUED | OUTPATIENT
Start: 2024-09-23 | End: 2024-09-24 | Stop reason: HOSPADM

## 2024-09-23 RX ORDER — SODIUM CHLORIDE 9 MG/ML
40 INJECTION, SOLUTION INTRAVENOUS AS NEEDED
Status: DISCONTINUED | OUTPATIENT
Start: 2024-09-23 | End: 2024-09-24 | Stop reason: HOSPADM

## 2024-09-23 RX ORDER — POLYETHYLENE GLYCOL 3350 17 G/17G
17 POWDER, FOR SOLUTION ORAL DAILY PRN
Status: DISCONTINUED | OUTPATIENT
Start: 2024-09-23 | End: 2024-09-24 | Stop reason: HOSPADM

## 2024-09-23 RX ORDER — ATORVASTATIN CALCIUM 20 MG/1
40 TABLET, FILM COATED ORAL DAILY
Status: CANCELLED | OUTPATIENT
Start: 2024-09-23

## 2024-09-23 RX ADMIN — HYDROCODONE BITARTRATE AND ACETAMINOPHEN 1 TABLET: 5; 325 TABLET ORAL at 14:15

## 2024-09-23 RX ADMIN — METHOCARBAMOL TABLETS 750 MG: 750 TABLET, COATED ORAL at 21:46

## 2024-09-23 RX ADMIN — Medication 10 ML: at 21:52

## 2024-09-23 RX ADMIN — DIAZEPAM 5 MG: 5 TABLET ORAL at 18:38

## 2024-09-23 RX ADMIN — KETOROLAC TROMETHAMINE 15 MG: 15 INJECTION, SOLUTION INTRAMUSCULAR; INTRAVENOUS at 21:46

## 2024-09-23 RX ADMIN — DEXAMETHASONE SODIUM PHOSPHATE 4 MG: 4 INJECTION, SOLUTION INTRAMUSCULAR; INTRAVENOUS at 16:23

## 2024-09-23 RX ADMIN — ONDANSETRON 4 MG: 4 TABLET, ORALLY DISINTEGRATING ORAL at 14:14

## 2024-09-23 NOTE — ED NOTES
..Nursing report ED to floor  Rob Shah  73 y.o.  male    HPI :  HPI (Adult)  Stated Reason for Visit: back pain  History Obtained From: patient    Chief Complaint  Chief Complaint   Patient presents with    Back Pain       Admitting doctor:   Apolonia Landry MD    Admitting diagnosis:   The encounter diagnosis was Acute midline low back pain without sciatica.    Code status:   Current Code Status       Date Active Code Status Order ID Comments User Context       Not on file            Allergies:   Penicillins    Isolation:   No active isolations    Intake and Output  No intake or output data in the 24 hours ending 09/23/24 1635    Weight:   There were no vitals filed for this visit.    Most recent vitals:   Vitals:    09/23/24 1401 09/23/24 1431 09/23/24 1501 09/23/24 1531   BP: 123/68 135/81 123/77 117/74   Pulse: 69 72 62 61   Resp:       Temp:       TempSrc:       SpO2: 92% 93% 91% 92%       Active LDAs/IV Access:   Lines, Drains & Airways       Active LDAs       Name Placement date Placement time Site Days    Peripheral IV 09/23/24 1618 Anterior;Distal;Right;Upper Arm 09/23/24  1618  Arm  less than 1                    Labs (abnormal labs have a star):   Labs Reviewed   COMPREHENSIVE METABOLIC PANEL   CBC WITH AUTO DIFFERENTIAL   CBC AND DIFFERENTIAL    Narrative:     The following orders were created for panel order CBC & Differential.  Procedure                               Abnormality         Status                     ---------                               -----------         ------                     CBC Auto Differential[[id number]]                            In process                   Please view results for these tests on the individual orders.       EKG:   No orders to display       Meds given in ED:   Medications   sodium chloride 0.9 % flush 10 mL (has no administration in time range)   sodium chloride 0.9 % flush 10 mL (has no administration in time range)   sodium chloride 0.9 % flush 10 mL  (has no administration in time range)   sodium chloride 0.9 % infusion 40 mL (has no administration in time range)   sennosides-docusate (PERICOLACE) 8.6-50 MG per tablet 2 tablet (has no administration in time range)     And   polyethylene glycol (MIRALAX) packet 17 g (has no administration in time range)     And   bisacodyl (DULCOLAX) EC tablet 5 mg (has no administration in time range)     And   bisacodyl (DULCOLAX) suppository 10 mg (has no administration in time range)   ondansetron ODT (ZOFRAN-ODT) disintegrating tablet 4 mg (4 mg Oral Given 9/23/24 1414)   HYDROcodone-acetaminophen (NORCO) 5-325 MG per tablet 1 tablet (1 tablet Oral Given 9/23/24 1415)   dexAMETHasone (DECADRON) injection 4 mg (4 mg Intravenous Given 9/23/24 1623)       Imaging results:  CT Lumbar Spine Without Contrast    Result Date: 9/23/2024   There is essentially no significant degree of canal stenosis seen throughout the lumbar spine. The most prominent foraminal stenosis is noted within the right L4-5 neural foramen where there is a mild to moderate degree of foraminal stenosis secondary to a disc osteophyte complex and loss of foraminal height as well as the bilateral L5-S1 neural foramina where loss of foraminal height and disc osteophyte complex result in relatively mild degrees of foraminal stenosis.  The remaining degenerative phenomena within the lumbar spine are as discussed in detail above.   Radiation dose reduction techniques were utilized, including automated exposure control and exposure modulation based on body size.  This report was finalized on 9/23/2024 3:38 PM by Dr. Christopher Bates M.D on Workstation: [id number]       Ambulatory status:   - 1 person assist    Social issues:   Social History     Socioeconomic History    Marital status:     Number of children: 2    Years of education: 12TH GRADE   Tobacco Use    Smoking status: Former     Current packs/day: 0.00     Average packs/day: 1 pack/day for 30.0 years  (30.0 ttl pk-yrs)     Types: Cigarettes     Start date:      Quit date:      Years since quittin.7    Smokeless tobacco: Never   Vaping Use    Vaping status: Never Used   Substance and Sexual Activity    Alcohol use: Yes     Comment: RARELY    Drug use: No    Sexual activity: Defer       Peripheral Neurovascular  Peripheral Neurovascular (Adult)  Peripheral Neurovascular WDL: WDL    Neuro Cognitive  Neuro Cognitive (Adult)  Cognitive/Neuro/Behavioral WDL: WDL    Learning  Learning Assessment (Adult)  Learning Readiness and Ability: no barriers identified    Respiratory  Respiratory WDL  Respiratory WDL: WDL    Abdominal Pain       Pain Assessments  Pain (Adult)  (0-10) Pain Rating: Rest: 8    NIH Stroke Scale       Berenice Dodd RN  24 16:35 EDT

## 2024-09-23 NOTE — PLAN OF CARE
Goal Outcome Evaluation:              Outcome Evaluation: pt is a 73 year old male admitted to the obs unit for low back pain, pt is aox4, vss, pt is complaing of sharp pain in low back when standing and sitting, pt to go for mri,  nuerosurgery is consulted, pt has no further questions at this time, pt is resting at this time

## 2024-09-23 NOTE — ED PROVIDER NOTES
EMERGENCY DEPARTMENT MD ATTESTATION NOTE    Room Number:  123/1  PCP: Rob Travis MD  Independent Historians: Patient and Family -patient's wife    HPI:  A complete HPI/ROS/PMH/PSH/SH/FH are unobtainable due to: None    Chronic or social conditions impacting patient care (Social Determinants of Health): None      Context: Rob Shah is a 73 y.o. male with a medical history of arthritis, CKD, degenerative disc disease and BPH who presents to the ED c/o acute pain in the low back area for the past week.  He denies any recent injuries to explain this pain.  He has had similar pain episodes in the past that required epidural injections but he has never had any lumbosacral spine surgeries before.  The pain does not radiate into the legs.  He denies any bowel or bladder incontinence.  And he denies any fevers.  He did try taking one of his hydrocodone tablets which were previously prescribed for his neck pain.  But that has not provided any substantial relief so far.    Review of prior external notes (non-ED) -and- Review of prior external test results outside of this encounter: I independently reviewed the family medicine progress note from April 25, 2024.  Patient had continued care for hyperlipidemia, hypothyroidism and cervical degenerative disc disease at that time.  He was prescribed hydrocodone.    Prescription drug monitoring program review: [facility] reviewed by Apolonia Landry MD, Niko Bueno MD         PHYSICAL EXAM    I have reviewed the triage vital signs and nursing notes.    ED Triage Vitals   Temp Heart Rate Resp BP SpO2   09/23/24 1325 09/23/24 1325 09/23/24 1325 09/23/24 1335 09/23/24 1325   97 °F (36.1 °C) 77 16 133/76 95 %      Temp src Heart Rate Source Patient Position BP Location FiO2 (%)   09/23/24 1325 09/23/24 1325 -- -- --   Tympanic Monitor          Physical Exam  GENERAL: alert, no acute distress  SKIN: Warm, dry, no rashes  HENT: Normocephalic, atraumatic  EYES: no scleral icterus  CV:  regular rhythm, regular rate  RESPIRATORY: normal effort, lungs clear  ABDOMEN: soft, nontender, nondistended  MUSCULOSKELETAL: no deformity or asymmetry of extremities.  There is moderate paravertebral tenderness at the lumbosacral back soft tissues, particularly around the L4 and L5 and S1 region.  No step-off or deformity palpable.  NEURO: alert, moves all extremities, follows commands      MEDICATIONS GIVEN IN ER  Medications   sodium chloride 0.9 % flush 10 mL (has no administration in time range)   sodium chloride 0.9 % flush 10 mL (has no administration in time range)   sodium chloride 0.9 % flush 10 mL (has no administration in time range)   sodium chloride 0.9 % infusion 40 mL (has no administration in time range)   sennosides-docusate (PERICOLACE) 8.6-50 MG per tablet 2 tablet (has no administration in time range)     And   polyethylene glycol (MIRALAX) packet 17 g (has no administration in time range)     And   bisacodyl (DULCOLAX) EC tablet 5 mg (has no administration in time range)     And   bisacodyl (DULCOLAX) suppository 10 mg (has no administration in time range)   oxyCODONE-acetaminophen (PERCOCET) 5-325 MG per tablet 1 tablet (has no administration in time range)   ketorolac (TORADOL) injection 15 mg (has no administration in time range)   methocarbamol (ROBAXIN) tablet 750 mg (750 mg Oral Not Given 9/23/24 2040)   ondansetron ODT (ZOFRAN-ODT) disintegrating tablet 4 mg (4 mg Oral Given 9/23/24 1414)   HYDROcodone-acetaminophen (NORCO) 5-325 MG per tablet 1 tablet (1 tablet Oral Given 9/23/24 1415)   dexAMETHasone (DECADRON) injection 4 mg (4 mg Intravenous Given 9/23/24 1623)   diazePAM (VALIUM) tablet 5 mg (5 mg Oral Given 9/23/24 1838)         ORDERS PLACED DURING THIS VISIT:  Orders Placed This Encounter   Procedures    CT Lumbar Spine Without Contrast    MRI Lumbar Spine Without Contrast    Comprehensive Metabolic Panel    CBC Auto Differential    CBC (No Diff)    Basic Metabolic Panel     Protime-INR    Diet: Regular/House; Fluid Consistency: Thin (IDDSI 0)    NPO Diet NPO Type: Strict NPO    Up With Assistance    Intake & Output    Weigh Patient    Oral Care    Saline Lock & Maintain IV Access    Place Sequential Compression Device    Maintain Sequential Compression Device    Vital Signs    Code Status and Medical Interventions: CPR (Attempt to Resuscitate); Full Support    Inpatient Neurosurgery Consult    PT Consult: Eval & Treat Functional Mobility Below Baseline    Insert Peripheral IV    Insert Peripheral IV    Initiate ED Observation Status    CBC & Differential         PROCEDURES  Procedures      PROGRESS, DATA ANALYSIS, CONSULTS, AND MEDICAL DECISION MAKING  All labs have been independently interpreted by me.  All radiology studies have been reviewed by me. All EKG's have been independently viewed and interpreted by me.  Discussion below represents my analysis of pertinent findings related to patient's condition, differential diagnosis, treatment plan and final disposition.    Differential diagnosis includes but is not limited to degenerative disc disease, disc herniation, cauda equina syndrome,.    Clinical Scores:                   ED Course as of 09/23/24 2046   Mon Sep 23, 2024   1607 I reassessed the patient.  Reports he has had no improvement in his pain.  He was previously receiving epidural injections.  Offered admission to observation unit for anesthesiology consultation and patient is agreeable. [MP]   1611 I spoke with Tawnya in the observation unit.  Discussed patient presentation and ED findings.  She agrees to admit patient to an ED observation bed. [MP]      ED Course User Index  [MP] Valerie Nguyen, KEANU       MDM:   I independently interpreted the CT lumbar spine without contrast study and my findings are: No acute fracture or subluxation.    Patient is afebrile and there do not appear to be any acute neurologic deficits.  I have low suspicion for cauda equina syndrome or  any other neurosurgical emergency problem right now.  However, he seems to be failing outpatient management of this exacerbation of low back pain problem.  Therefore we are making arrangements to place him in the observation unit for further supportive care and a spine surgery consult as well as MRI of the lumbar spine for further diagnostic workup.  He is agreeable with this plan.    COMPLEXITY OF CARE  The patient requires admission.    Please note that portions of this document were completed with a voice recognition program.    Note Disclaimer: At [facility], we believe that sharing information builds trust and better relationships. You are receiving this note because you recently visited [facility]. It is possible you will see health information before a provider has talked with you about it. This kind of information can be easy to misunderstand. To help you fully understand what it means for your health, we urge you to discuss this note with your provider.       Niko Bueno MD  09/23/24 [id number]

## 2024-09-23 NOTE — PROGRESS NOTES
Clinical Pharmacy Services: Medication History    Rob Shah is a 73 y.o. male presenting to [facility] for   Chief Complaint   Patient presents with    Back Pain       He  has a past medical history of Allergic rhinitis, Bilateral tinnitus, BPH (benign prostatic hyperplasia), CKD (chronic kidney disease), Colon polyps, DDD (degenerative disc disease), cervical, DDD (degenerative disc disease), lumbosacral, Hyperlipidemia, Hypothyroidism, acquired, Liver hemangioma (01/18/2016), Nodular prostate without urinary obstruction, Osteoarthritis, and Sleep apnea.    Allergies as of 09/23/2024 - Reviewed 09/23/2024   Allergen Reaction Noted    Penicillins Unknown - Low Severity 12/11/2015       Medication information was obtained from: Patient   Pharmacy and Phone Number:     Prior to Admission Medications       Prescriptions Last Dose Informant Patient Reported? Taking?    alfuzosin (UROXATRAL) 10 MG 24 hr tablet  Self Yes Yes    Take 1 tablet by mouth every night at bedtime.    ascorbic acid (VITAMIN C) 500 MG tablet  Self Yes Yes    Take 1 tablet by mouth Daily With Dinner.    atorvastatin (LIPITOR) 40 MG tablet  Self No Yes    Take 1 tablet by mouth Daily.    Cholecalciferol (Vitamin D3) 50 MCG (2000 UT) tablet  Self Yes Yes    Take 1 tablet by mouth Daily With Dinner.    fluticasone (FLONASE) 50 MCG/ACT nasal spray  Self No Yes    2 sprays into the nostril(s) as directed by provider Daily.    HYDROcodone-acetaminophen (Norco) 7.5-325 MG per tablet  Self No Yes    Take 1 tablet by mouth Daily As Needed for Moderate Pain.    levothyroxine (Synthroid) 100 MCG tablet  Self No Yes    Take 1 tablet by mouth Daily.    ondansetron ODT (ZOFRAN-ODT) 4 MG disintegrating tablet  Self No Yes    Place 1 tablet on the tongue Every 8 (Eight) Hours As Needed for Nausea or Vomiting.    Zinc 50 MG capsule  Self Yes Yes    Take 1 tablet by mouth Daily With Dinner.    baclofen (LIORESAL) 20 MG tablet  Self No No    Take 1  tablet by mouth Every Night.    Patient taking differently:  Take 1 tablet by mouth As Needed.    diclofenac (VOLTAREN) 75 MG EC tablet  Self No No    Take 1 tablet by mouth 2 (Two) Times a Day.    valACYclovir (VALTREX) 1000 MG tablet  Self No No    TAKE 2 TABLETS EVERY TWELVE HOURS FOR 2 DOSES AS NEEDED    Patient taking differently:  As Needed. TAKE 2 TABLETS EVERY TWELVE HOURS FOR 2 DOSES AS NEEDED              Medication notes:     This medication list is complete to the best of my knowledge as of 9/23/2024    Please call if questions.    Mariela Elizabeth  Medication History Technician   [phone]    9/23/2024 16:51 EDT

## 2024-09-23 NOTE — ED PROVIDER NOTES
EMERGENCY DEPARTMENT ENCOUNTER  Room Number:  43/43  PCP: Rob Travis MD  Independent Historians: Patient      HPI:  Chief Complaint: had concerns including Back Pain.     A complete HPI/ROS/PMH/PSH/SH/FH are unobtainable due to: None    Chronic or social conditions impacting patient care (Social Determinants of Health): None      Context: Rob Shah is a 73 y.o. male with a medical history of lumbar degenerative disc disease, osteoarthritis, hyperlipidemia, BPH, and chronic kidney disease who presents to the ED c/o acute low back pain.  Patient reports that he developed low back pain 8 days ago.  No fall or injury to the back.  Pain does not radiate down the legs.  Pain worsened with transition of sitting to standing and with any movement.  Patient reports he previously received epidural injections in his back 10 years ago.  Patient denies bowel/bladder incontinence.  No saddle anesthesia.  No reported fever.  No other systemic complaints at this time.      Review of prior external notes (non-ED) -and- Review of prior external test results outside of this encounter:  Patient seen in office by PCP on 4/25/2024 for hyperlipidemia and cervical/lumbar degeneration.  Reviewed assessment and plan.  Patient prescribed Norco for back and neck pain.  Patient will follow-up in 6 months.  Reviewed labs collected on 4/17/2024.  CBC with hemoglobin 13.4, CMP with creatinine 1.10.    Prescription drug monitoring program review:     N/A    PAST MEDICAL HISTORY  Active Ambulatory Problems     Diagnosis Date Noted    Degeneration of intervertebral disc of lumbosacral region     ED (erectile dysfunction)     Hypothyroidism, acquired     Insomnia     Nodular prostate without urinary obstruction     Osteoarthritis     Hyperlipidemia     Testicular hypofunction     Bilateral tinnitus     Allergic rhinitis due to pollen 12/15/2015    Nausea 07/06/2016    Neck pain 09/12/2017    Pain of both shoulder joints 09/12/2017     Degenerative disc disease, cervical 09/12/2017    History of colon polyps 09/05/2019    Herpes simplex 04/16/2020    Artificial lens present 02/21/2018    Keratoconjunctivitis sicca not specified as Sjogren's 06/26/2015     Resolved Ambulatory Problems     Diagnosis Date Noted    No Resolved Ambulatory Problems     Past Medical History:   Diagnosis Date    Allergic rhinitis     BPH (benign prostatic hyperplasia)     CKD (chronic kidney disease)     Colon polyps     DDD (degenerative disc disease), cervical     DDD (degenerative disc disease), lumbosacral     Liver hemangioma 01/18/2016    Sleep apnea          PAST SURGICAL HISTORY  Past Surgical History:   Procedure Laterality Date    CARDIAC CATHETERIZATION  2003    D/T CP AND ABNML LABS    COLONOSCOPY N/A 10/18/2011    ENTIRE COLON WNL, RESCOPE IN 5 YRS, DR.RAYMOND BELLO AT [facility]    COLONOSCOPY N/A 11/09/2016    3 TUBULAR ADENOMA POLYPS IN CECUM, 2 TUBULAR ADENOMA POLYPS IN ASCENDING, 7 MM TUBULAR ADENOMA POLYP IN HEPATIC FLEXURE, RESCOPE IN 3 YRS, DR. MARCUS BEGUM AT [facility]    COLONOSCOPY N/A 11/14/2019    3 TUBULAR ADENOMA POLYPS IN TRANSVERSE, RESCOPE IN 3 YRS, DR. MARCUS BEGUM AT [facility]    COLONOSCOPY N/A 11/16/2022    4 MM BENIGN POLYP IN SIGMOID, MULTIPLE SMALL AND LARGE DIVERTICULA IN SIGMOID, RESCOPE IN 5 YRS, DR. MARCUS BEGUM AT [facility]    ENDOSCOPY      ENDOSCOPY AND COLONOSCOPY N/A 09/01/2005    NO RECORDS    HAND SURGERY Right 1961    HERNIA REPAIR N/A 1978    EPIGASTRIC HERNIA REPAIR    KNEE ARTHROSCOPY Left 12/02/2020    Procedure: LEFT KNEE ARTHROSCOPY. PARTIAL MEDIAL MENISECTOMY;  Surgeon: Juan Smith MD;  Location: [facility] [place] [facility] [place];  Service: Orthopedics;  Laterality: Left;    LUMBAR EPIDURAL INJECTION N/A 08/11/2016    DR. THOMAS CASEY AT [facility]    LUMBAR EPIDURAL INJECTION N/A 06/07/2011    DR. SULLY HERNANDEZ AT [facility]    LUMBAR EPIDURAL INJECTION N/A 01/11/2011    DR. BRENT DUNN AT [facility]    LUMBAR EPIDURAL INJECTION N/A 10/07/2010    DR. JAY CHAMORRO AT [facility]     LUMBAR EPIDURAL INJECTION N/A 2010    DR. FREDDY MEJIA AT [facility]    LUMBAR EPIDURAL INJECTION N/A 2010    DR. FREDDY MEJIA AT [facility]    LUMBAR EPIDURAL INJECTION N/A 2009    DR. JAY CHAMORRO AT [facility]    LUMBAR EPIDURAL INJECTION N/A 2009    DR. BRIAN MELVIN AT [facility]    LUMBAR EPIDURAL INJECTION N/A 2007    DR. JAY CHAMORRO AT [facility]    LUMBAR EPIDURAL INJECTION N/A 2007    DR. AGUSTIN GONZALES AT [facility]    LUMBAR EPIDURAL INJECTION N/A 2007    DR. KENDRA CHANG AT [facility]    LUMBAR EPIDURAL INJECTION N/A 2006    DR. LÓPEZ DACOSTA AT [facility]    LUMBAR EPIDURAL INJECTION N/A 2006    DR. KENDRA CHANG AT [facility]    PROSTATE BIOPSY      X2    VASECTOMY N/A 1972         FAMILY HISTORY  Family History   Problem Relation Age of Onset    Heart failure Mother     Kidney disease Mother     Heart disease Mother     Cancer Father         BRAIN    Brain cancer Father     Colon cancer Paternal Uncle     Cancer Paternal Uncle     Malig Hyperthermia Neg Hx          SOCIAL HISTORY  Social History     Socioeconomic History    Marital status:     Number of children: 2    Years of education: 12TH GRADE   Tobacco Use    Smoking status: Former     Current packs/day: 0.00     Average packs/day: 1 pack/day for 30.0 years (30.0 ttl pk-yrs)     Types: Cigarettes     Start date:      Quit date:      Years since quittin.7    Smokeless tobacco: Never   Vaping Use    Vaping status: Never Used   Substance and Sexual Activity    Alcohol use: Yes     Comment: RARELY    Drug use: No    Sexual activity: Defer         ALLERGIES  Penicillins      REVIEW OF SYSTEMS  Review of Systems   Constitutional:  Negative for chills and fever.   HENT:  Negative for ear pain and sore throat.    Respiratory:  Negative for cough and shortness of breath.    Cardiovascular:  Negative for chest pain and palpitations.   Gastrointestinal:  Negative for abdominal pain and vomiting.   Genitourinary:  Negative for dysuria and  hematuria.   Musculoskeletal:  Positive for back pain. Negative for arthralgias and joint swelling.   Skin:  Negative for pallor and rash.   Neurological:  Negative for syncope and headaches.   Psychiatric/Behavioral:  Negative for confusion and hallucinations.      Included in HPI  All systems reviewed and negative except for those discussed in HPI.      PHYSICAL EXAM    I have reviewed the triage vital signs and nursing notes.    ED Triage Vitals   Temp Heart Rate Resp BP SpO2   09/23/24 1325 09/23/24 1325 09/23/24 1325 09/23/24 1335 09/23/24 1325   97 °F (36.1 °C) 77 16 133/76 95 %      Temp src Heart Rate Source Patient Position BP Location FiO2 (%)   09/23/24 1325 09/23/24 1325 -- -- --   Tympanic Monitor          Physical Exam  Constitutional:       General: He is not in acute distress.     Appearance: Normal appearance.   HENT:      Head: Normocephalic and atraumatic.      Nose: Nose normal.      Mouth/Throat:      Mouth: Mucous membranes are moist.   Eyes:      Conjunctiva/sclera: Conjunctivae normal.      Pupils: Pupils are equal, round, and reactive to light.   Cardiovascular:      Rate and Rhythm: Normal rate and regular rhythm.      Pulses: Normal pulses.           Posterior tibial pulses are 2+ on the right side and 2+ on the left side.      Heart sounds: Normal heart sounds.   Pulmonary:      Effort: Pulmonary effort is normal.      Breath sounds: Normal breath sounds.   Abdominal:      General: There is no distension.   Musculoskeletal:         General: Normal range of motion.      Cervical back: Normal range of motion and neck supple.      Comments: Patient has tenderness over lower lumbar spine.  No step-off or deformity.   Skin:     General: Skin is warm.      Capillary Refill: Capillary refill takes less than 2 seconds.   Neurological:      General: No focal deficit present.      Mental Status: He is alert and oriented to person, place, and time.   Psychiatric:         Mood and Affect: Mood  normal.           LAB RESULTS  Recent Results (from the past 24 hour(s))   CBC Auto Differential    Collection Time: 09/23/24  4:23 PM    Specimen: Blood   Result Value Ref Range    WBC 7.62 3.40 - 10.80 10*3/mm3    RBC 4.39 4.14 - 5.80 10*6/mm3    Hemoglobin 14.0 13.0 - 17.7 g/dL    Hematocrit 41.0 37.5 - 51.0 %    MCV 93.4 79.0 - 97.0 fL    MCH 31.9 26.6 - 33.0 pg    MCHC 34.1 31.5 - 35.7 g/dL    RDW 12.0 (L) 12.3 - 15.4 %    RDW-SD 41.5 37.0 - 54.0 fl    MPV 9.3 6.0 - 12.0 fL    Platelets 263 140 - 450 10*3/mm3    Neutrophil % 65.7 42.7 - 76.0 %    Lymphocyte % 20.5 19.6 - 45.3 %    Monocyte % 11.0 5.0 - 12.0 %    Eosinophil % 2.0 0.3 - 6.2 %    Basophil % 0.5 0.0 - 1.5 %    Immature Grans % 0.3 0.0 - 0.5 %    Neutrophils, Absolute 5.01 1.70 - 7.00 10*3/mm3    Lymphocytes, Absolute 1.56 0.70 - 3.10 10*3/mm3    Monocytes, Absolute 0.84 0.10 - 0.90 10*3/mm3    Eosinophils, Absolute 0.15 0.00 - 0.40 10*3/mm3    Basophils, Absolute 0.04 0.00 - 0.20 10*3/mm3    Immature Grans, Absolute 0.02 0.00 - 0.05 10*3/mm3    nRBC 0.0 0.0 - 0.2 /100 WBC         RADIOLOGY  CT Lumbar Spine Without Contrast    Result Date: 9/23/2024  CT SCAN OF THE LUMBAR SPINE WITHOUT CONTRAST  CLINICAL HISTORY: Low back pain.  TECHNIQUE: CT scan of the lumbar spine was obtained with a combination of 3 , 2, and 1 mm axial images. Bone and soft tissue algorithm images were obtained with sagittal and coronal reconstructed images.  FINDINGS:  At T12-L1 and L1-2, there is no significant canal or foraminal stenosis.  At L2-3, there is a disc osteophyte complex which minimally indents the ventral subarachnoid space and minimally narrows the neural foramina.  At L3-4, there is a minimal disc bulge but no significant canal or foraminal narrowing is noted.  At L4-5, there is a disc osteophyte complex eccentric to the right which results in a mild to moderate degree of foraminal narrowing in conjunction with loss of foraminal height. Degenerative disc  changes are most prominently seen along the right side of the L4-5 disc space. There is no significant degree of canal or left foraminal narrowing. Mild facet hypertrophic changes are seen at L4-5.  At L5-S1, a disc osteophyte complex and loss of foraminal height mildly narrows the neural foramina. Moderate degenerative disc changes are identified. Mild facet hypertrophy is seen at L5-S1.       There is essentially no significant degree of canal stenosis seen throughout the lumbar spine. The most prominent foraminal stenosis is noted within the right L4-5 neural foramen where there is a mild to moderate degree of foraminal stenosis secondary to a disc osteophyte complex and loss of foraminal height as well as the bilateral L5-S1 neural foramina where loss of foraminal height and disc osteophyte complex result in relatively mild degrees of foraminal stenosis.  The remaining degenerative phenomena within the lumbar spine are as discussed in detail above.   Radiation dose reduction techniques were utilized, including automated exposure control and exposure modulation based on body size.  This report was finalized on 9/23/2024 3:38 PM by Dr. Christopher Bates M.D on Workstation: [id number]         MEDICATIONS GIVEN IN ER  Medications   sodium chloride 0.9 % flush 10 mL (has no administration in time range)   dexAMETHasone (DECADRON) injection 4 mg (has no administration in time range)   ondansetron ODT (ZOFRAN-ODT) disintegrating tablet 4 mg (4 mg Oral Given 9/23/24 1414)   HYDROcodone-acetaminophen (NORCO) 5-325 MG per tablet 1 tablet (1 tablet Oral Given 9/23/24 1415)         ORDERS PLACED DURING THIS VISIT:  Orders Placed This Encounter   Procedures    CT Lumbar Spine Without Contrast    Comprehensive Metabolic Panel    CBC Auto Differential    Insert Peripheral IV    CBC & Differential         OUTPATIENT MEDICATION MANAGEMENT:  Current Facility-Administered Medications Ordered in Epic   Medication Dose Route  Frequency Provider Last Rate Last Admin    dexAMETHasone (DECADRON) injection 4 mg  4 mg Intravenous Once Valerie Nguyen PA-C        sodium chloride 0.9 % flush 10 mL  10 mL Intravenous PRN Valerie Nguyen PA-C         Current Outpatient Medications Ordered in Epic   Medication Sig Dispense Refill    alfuzosin (UROXATRAL) 10 MG 24 hr tablet Take 1 tablet by mouth every night at bedtime.      ascorbic acid (VITAMIN C) 500 MG tablet Take 1 tablet by mouth Daily With Dinner.      atorvastatin (LIPITOR) 40 MG tablet Take 1 tablet by mouth Daily. 90 tablet 1    baclofen (LIORESAL) 20 MG tablet Take 1 tablet by mouth Every Night. (Patient taking differently: Take 1 tablet by mouth As Needed.) 90 tablet 1    Cholecalciferol (Vitamin D3) 50 MCG (2000 UT) tablet Take 1 tablet by mouth Daily With Dinner.      diclofenac (VOLTAREN) 75 MG EC tablet Take 1 tablet by mouth 2 (Two) Times a Day. 30 tablet 2    fluticasone (FLONASE) 50 MCG/ACT nasal spray 2 sprays into the nostril(s) as directed by provider Daily. 48 g 3    HYDROcodone-acetaminophen (Norco) 7.5-325 MG per tablet Take 1 tablet by mouth Daily As Needed for Moderate Pain. 40 tablet 0    levothyroxine (Synthroid) 100 MCG tablet Take 1 tablet by mouth Daily. 90 tablet 1    ondansetron ODT (ZOFRAN-ODT) 4 MG disintegrating tablet Place 1 tablet on the tongue Every 8 (Eight) Hours As Needed for Nausea or Vomiting. 30 tablet 5    valACYclovir (VALTREX) 1000 MG tablet TAKE 2 TABLETS EVERY TWELVE HOURS FOR 2 DOSES AS NEEDED (Patient taking differently: As Needed. TAKE 2 TABLETS EVERY TWELVE HOURS FOR 2 DOSES AS NEEDED) 40 tablet 3    Zinc 50 MG capsule Take 1 tablet by mouth Daily With Dinner.           PROGRESS, DATA ANALYSIS, CONSULTS, AND MEDICAL DECISION MAKING  All labs have been independently interpreted by me.  All radiology studies have been reviewed by me. All EKG's have been independently viewed and interpreted by me.  Discussion below represents my analysis  of pertinent findings related to patient's condition, differential diagnosis, treatment plan and final disposition.    Differential diagnosis includes but is not limited to herniated lumbar disc, lumbar stenosis, vertebral fracture.        ED Course as of 09/23/24 1637   Mon Sep 23, 2024   1607 I reassessed the patient.  Reports he has had no improvement in his pain.  He was previously receiving epidural injections.  Offered admission to observation unit for anesthesiology consultation and patient is agreeable. [MP]   1611 I spoke with Tawnya in the observation unit.  Discussed patient presentation and ED findings.  She agrees to admit patient to an ED observation bed. [MP]      ED Course User Index  [MP] Valerie Nguyen PA-C             AS OF 16:09 EDT VITALS:    BP - 117/74  HR - 61  TEMP - 97 °F (36.1 °C) (Tympanic)  O2 SATS - 92%    COMPLEXITY OF CARE  The patient requires admission.      DIAGNOSIS  Final diagnoses:   Acute midline low back pain without sciatica         DISPOSITION  ED Disposition       ED Disposition   Intended Admit    Condition   --    Comment   --                Please note that portions of this document were completed with a voice recognition program.    Note Disclaimer: At [facility], we believe that sharing information builds trust and better relationships. You are receiving this note because you recently visited [facility]. It is possible you will see health information before a provider has talked with you about it. This kind of information can be easy to misunderstand. To help you fully understand what it means for your health, we urge you to discuss this note with your provider.         Valerie Nguyen PA-C  09/23/24 1637

## 2024-09-23 NOTE — H&P
[facility]   HISTORY AND PHYSICAL    Patient Name: Rob Shah  : 1950  MRN: [id number]  Primary Care Physician:  Rob Travis MD  Date of admission: 2024    Subjective   Subjective     Chief Complaint:   Chief Complaint   Patient presents with    Back Pain         HPI:    Rob Shah is a pleasant afebrile ambulatory 73 y.o.  male with a past medical history of hyperlipidemia and hypothyroidism.    He presents to the emergency department at [facility] today with complaint of low back pain.  He has been admitted to the ED observation unit for further testing and evaluation.    States over the past 9 days he has had worsening low back pain, he describes it as mid to lower lumbar spine that is sharp/stabbing in nature. He denies any recent trauma or injury. He states he used to get lumbar epidural steroid injections which helped.     He denies any loss of bowel or bladder control, saddle anesthesia or focal weakness. He denies any radiation of pain.  He denies any fevers or chills.  Pain is worse with going from sitting to standing. Better with lying flat. States he has taken a norco without much relief.     Review of Systems   All systems were reviewed and negative except for: What is mentioned above    Personal History     Past Medical History:   Diagnosis Date    Allergic rhinitis     CHRONIC, D/T POLLEN    Bilateral tinnitus     BPH (benign prostatic hyperplasia)     FOLLOWED BY DR. ROSE LANZA    CKD (chronic kidney disease)     STAGE II, FOLLOWED BY DR. MAX NOEL    Colon polyps     FOLLOWED BY DR. MARCUS BEGUM    DDD (degenerative disc disease), cervical     DDD (degenerative disc disease), lumbosacral     CHRONIC LUMBAR PAIN    Hyperlipidemia     MIXED    Hypothyroidism, acquired     Liver hemangioma 2016    FOLLOWED BY DR. MAX NOEL    Nodular prostate without urinary obstruction     FOLLOWED BY DR. ROSE LANZA     Osteoarthritis     Sleep apnea     DOES NOT WEAR CPAP       Past Surgical History:   Procedure Laterality Date    CARDIAC CATHETERIZATION  2003    D/T CP AND ABNML LABS    COLONOSCOPY N/A 10/18/2011    ENTIRE COLON WNL, RESCOPE IN 5 YRS, DR.RAYMOND BELLO AT [facility]    COLONOSCOPY N/A 11/09/2016    3 TUBULAR ADENOMA POLYPS IN CECUM, 2 TUBULAR ADENOMA POLYPS IN ASCENDING, 7 MM TUBULAR ADENOMA POLYP IN HEPATIC FLEXURE, RESCOPE IN 3 YRS, DR. MARCUS BEGUM AT [facility]    COLONOSCOPY N/A 11/14/2019    3 TUBULAR ADENOMA POLYPS IN TRANSVERSE, RESCOPE IN 3 YRS, DR. MARCUS BEGUM AT [facility]    COLONOSCOPY N/A 11/16/2022    4 MM BENIGN POLYP IN SIGMOID, MULTIPLE SMALL AND LARGE DIVERTICULA IN SIGMOID, RESCOPE IN 5 YRS, DR. MARCUS BEGUM AT [facility]    ENDOSCOPY      ENDOSCOPY AND COLONOSCOPY N/A 09/01/2005    NO RECORDS    HAND SURGERY Right 1961    HERNIA REPAIR N/A 1978    EPIGASTRIC HERNIA REPAIR    KNEE ARTHROSCOPY Left 12/02/2020    Procedure: LEFT KNEE ARTHROSCOPY. PARTIAL MEDIAL MENISECTOMY;  Surgeon: Juan Smith MD;  [place] [facility] [place] [facility] [place]  Service: Orthopedics;  Laterality: Left;    LUMBAR EPIDURAL INJECTION N/A 08/11/2016    DR. THOMAS CASEY AT [facility]    LUMBAR EPIDURAL INJECTION N/A 06/07/2011    DR. SULLY HERNANDEZ AT [facility]    LUMBAR EPIDURAL INJECTION N/A 01/11/2011    DR. BRENT DUNN AT [facility]    LUMBAR EPIDURAL INJECTION N/A 10/07/2010    DR. JAY CHAMORRO AT [facility]    LUMBAR EPIDURAL INJECTION N/A 04/05/2010    DR. FREDDY MEJIA AT [facility]    LUMBAR EPIDURAL INJECTION N/A 03/24/2010    DR. FREDDY MEJIA AT [facility]    LUMBAR EPIDURAL INJECTION N/A 08/26/2009    DR. JAY CHAMORRO AT [facility]    LUMBAR EPIDURAL INJECTION N/A 08/19/2009    DR. BRIAN MELVIN AT [facility]    LUMBAR EPIDURAL INJECTION N/A 06/28/2007    DR. JAY CHAMORRO AT [facility]    LUMBAR EPIDURAL INJECTION N/A 07/05/2007    DR. AGUSTIN GONZALES AT [facility]    LUMBAR EPIDURAL INJECTION N/A 07/12/2007    DR. KENDRA CHANG AT [facility]    LUMBAR EPIDURAL INJECTION N/A 06/20/2006    DR. LÓPEZ DACOSTA AT [facility]    LUMBAR EPIDURAL  INJECTION N/A 01/26/2006    DR. KENDRA CHANG AT [facility]    PROSTATE BIOPSY      X2    VASECTOMY N/A 1972       Family History: family history includes Brain cancer in his father; Cancer in his father and paternal uncle; Colon cancer in his paternal uncle; Heart disease in his mother; Heart failure in his mother; Kidney disease in his mother. Otherwise pertinent FHx was reviewed and not pertinent to current issue.    Social History:  reports that he quit smoking about 27 years ago. His smoking use included cigarettes. He started smoking about 57 years ago. He has a 30 pack-year smoking history. He has never used smokeless tobacco. He reports current alcohol use. He reports that he does not use drugs.    Home Medications:  HYDROcodone-acetaminophen, Vitamin D3, Zinc, alfuzosin, ascorbic acid, atorvastatin, baclofen, diclofenac, fluticasone, levothyroxine, ondansetron ODT, and valACYclovir    Allergies:  Allergies   Allergen Reactions    Penicillins Unknown - Low Severity     Unsure, childhood       Objective   Objective     Vitals:   Temp:  [97 °F (36.1 °C)] 97 °F (36.1 °C)  Heart Rate:  [60-77] 60  Resp:  [16-18] 18  BP: (117-148)/(68-87) 148/87  Physical Exam    Constitutional: Awake, alert   Eyes: PERRLA, sclerae anicteric, no conjunctival injection   HENT: NCAT, mucous membranes moist   Neck: Supple, no thyromegaly, no lymphadenopathy, trachea midline   Respiratory: Clear to auscultation bilaterally, nonlabored respirations    Cardiovascular: RRR, no murmurs, rubs, or gallops, palpable pedal pulses bilaterally   Gastrointestinal: Positive bowel sounds, soft, nontender, nondistended   Musculoskeletal: No bilateral ankle edema, no clubbing or cyanosis to extremities   Psychiatric: Appropriate affect, cooperative   Neurologic: Oriented x 3, strength symmetric in all extremities, Cranial Nerves grossly intact to confrontation, speech clear   Skin: No rashes     Result Review    Result Review:  I have personally  reviewed the results from the time of this admission to 9/23/2024 17:18 EDT and agree with these findings:  [x]  Laboratory list / accordion  []  Microbiology  [x]  Radiology  []  EKG/Telemetry   []  Cardiology/Vascular   []  Pathology  []  Old records  []  Other:  Most notable findings include: Lab workup unremarkable.  CT lumbar spine shows most prominent foraminal stenosis within L4-5 neuroforamen where there is mild/moderate degree of foraminal stenosis secondary to disc osteophyte complex and loss of foraminal height as well as bilateral L5-S1 neural foramina but there is loss of foraminal height disc osteophyte fight complex      Assessment & Plan   Assessment / Plan     Brief Patient Summary:  Rob Shah is a 73 y.o. male who is being evaluated for acute exacerbation of his low back pain    Active Hospital Problems:  Active Hospital Problems    Diagnosis     **Low back pain      Plan:     Back pain  Multimodal analgesia with Toradol, Robaxin and Percocet  Consult to neurosurgery team  Regular diet, n.p.o. after midnight  CT lumbar spine does not show any acute fracture  MRI lumbar spine pending    VTE Prophylaxis:  Mechanical VTE prophylaxis orders are present.        CODE STATUS:    Level Of Support Discussed With: Patient  Code Status (Patient has no pulse and is not breathing): CPR (Attempt to Resuscitate)  Medical Interventions (Patient has pulse or is breathing): Full Support    Admission Status:  I believe this patient meets observation status.    Electronically signed by MIRANDA Rush, 09/23/24, 5:02 PM EDT.    79 minutes has been spent by [facility] providers in the care of this patient while under observation status      I have worn appropriate PPE during this patient encounter, sanitized my hands both with entering and exiting patient's room.    I have discussed plan of care with patient including advance care plan and/or surrogate decision maker.  Patient  advises that their wife Erin will be their primary surrogate decision maker

## 2024-09-24 ENCOUNTER — ANESTHESIA EVENT (OUTPATIENT)
Dept: PAIN MEDICINE | Facility: HOSPITAL | Age: 74
End: 2024-09-24
Payer: MEDICARE

## 2024-09-24 ENCOUNTER — APPOINTMENT (OUTPATIENT)
Dept: PAIN MEDICINE | Facility: HOSPITAL | Age: 74
End: 2024-09-24
Payer: MEDICARE

## 2024-09-24 ENCOUNTER — ANESTHESIA (OUTPATIENT)
Dept: PAIN MEDICINE | Facility: HOSPITAL | Age: 74
End: 2024-09-24
Payer: MEDICARE

## 2024-09-24 ENCOUNTER — READMISSION MANAGEMENT (OUTPATIENT)
Dept: CALL CENTER | Facility: HOSPITAL | Age: 74
End: 2024-09-24
Payer: MEDICARE

## 2024-09-24 ENCOUNTER — APPOINTMENT (OUTPATIENT)
Dept: GENERAL RADIOLOGY | Facility: HOSPITAL | Age: 74
End: 2024-09-24
Payer: MEDICARE

## 2024-09-24 VITALS
BODY MASS INDEX: 30.21 KG/M2 | TEMPERATURE: 97.7 F | HEIGHT: 69 IN | SYSTOLIC BLOOD PRESSURE: 154 MMHG | DIASTOLIC BLOOD PRESSURE: 86 MMHG | WEIGHT: 204 LBS | HEART RATE: 59 BPM | OXYGEN SATURATION: 92 % | RESPIRATION RATE: 16 BRPM

## 2024-09-24 PROBLEM — M51.36 ANNULAR TEAR OF LUMBAR DISC: Status: ACTIVE | Noted: 2024-09-24

## 2024-09-24 PROBLEM — M51.369 ANNULAR TEAR OF LUMBAR DISC: Status: ACTIVE | Noted: 2024-09-24

## 2024-09-24 LAB
ANION GAP SERPL CALCULATED.3IONS-SCNC: 11.6 MMOL/L (ref 5–15)
BUN SERPL-MCNC: 16 MG/DL (ref 8–23)
BUN/CREAT SERPL: 15.1 (ref 7–25)
CALCIUM SPEC-SCNC: 9.2 MG/DL (ref 8.6–10.5)
CHLORIDE SERPL-SCNC: 103 MMOL/L (ref 98–107)
CO2 SERPL-SCNC: 23.4 MMOL/L (ref 22–29)
CREAT SERPL-MCNC: 1.06 MG/DL (ref 0.76–1.27)
DEPRECATED RDW RBC AUTO: 43.2 FL (ref 37–54)
EGFRCR SERPLBLD CKD-EPI 2021: 73.2 ML/MIN/1.73
ERYTHROCYTE [DISTWIDTH] IN BLOOD BY AUTOMATED COUNT: 12.3 % (ref 12.3–15.4)
GLUCOSE SERPL-MCNC: 129 MG/DL (ref 65–99)
HCT VFR BLD AUTO: 41.3 % (ref 37.5–51)
HGB BLD-MCNC: 13.8 G/DL (ref 13–17.7)
INR PPP: 1 (ref 0.9–1.1)
MCH RBC QN AUTO: 31.7 PG (ref 26.6–33)
MCHC RBC AUTO-ENTMCNC: 33.4 G/DL (ref 31.5–35.7)
MCV RBC AUTO: 94.7 FL (ref 79–97)
PLATELET # BLD AUTO: 252 10*3/MM3 (ref 140–450)
PMV BLD AUTO: 9.5 FL (ref 6–12)
POTASSIUM SERPL-SCNC: 4.1 MMOL/L (ref 3.5–5.2)
PROTHROMBIN TIME: 13.4 SECONDS (ref 11.7–14.2)
RBC # BLD AUTO: 4.36 10*6/MM3 (ref 4.14–5.8)
SODIUM SERPL-SCNC: 138 MMOL/L (ref 136–145)
WBC NRBC COR # BLD AUTO: 7.48 10*3/MM3 (ref 3.4–10.8)

## 2024-09-24 PROCEDURE — 96376 TX/PRO/DX INJ SAME DRUG ADON: CPT

## 2024-09-24 PROCEDURE — G0378 HOSPITAL OBSERVATION PER HR: HCPCS

## 2024-09-24 PROCEDURE — 77003 FLUOROGUIDE FOR SPINE INJECT: CPT

## 2024-09-24 PROCEDURE — 85610 PROTHROMBIN TIME: CPT | Performed by: NURSE PRACTITIONER

## 2024-09-24 PROCEDURE — 99204 OFFICE O/P NEW MOD 45 MIN: CPT | Performed by: NURSE PRACTITIONER

## 2024-09-24 PROCEDURE — 85027 COMPLETE CBC AUTOMATED: CPT | Performed by: NURSE PRACTITIONER

## 2024-09-24 PROCEDURE — 80048 BASIC METABOLIC PNL TOTAL CA: CPT | Performed by: NURSE PRACTITIONER

## 2024-09-24 PROCEDURE — 97161 PT EVAL LOW COMPLEX 20 MIN: CPT

## 2024-09-24 PROCEDURE — 25010000002 METHYLPREDNISOLONE PER 80 MG: Performed by: ANESTHESIOLOGY

## 2024-09-24 PROCEDURE — 97530 THERAPEUTIC ACTIVITIES: CPT

## 2024-09-24 PROCEDURE — 25510000001 IOPAMIDOL 41 % SOLUTION: Performed by: ANESTHESIOLOGY

## 2024-09-24 PROCEDURE — 25010000002 DEXAMETHASONE PER 1 MG: Performed by: PHYSICIAN ASSISTANT

## 2024-09-24 RX ORDER — FENTANYL CITRATE 50 UG/ML
50 INJECTION, SOLUTION INTRAMUSCULAR; INTRAVENOUS ONCE
Status: DISCONTINUED | OUTPATIENT
Start: 2024-09-24 | End: 2024-09-24 | Stop reason: HOSPADM

## 2024-09-24 RX ORDER — LIDOCAINE HYDROCHLORIDE 10 MG/ML
1 INJECTION, SOLUTION INFILTRATION; PERINEURAL ONCE
Status: DISCONTINUED | OUTPATIENT
Start: 2024-09-24 | End: 2024-09-24 | Stop reason: HOSPADM

## 2024-09-24 RX ORDER — METHYLPREDNISOLONE ACETATE 80 MG/ML
80 INJECTION, SUSPENSION INTRA-ARTICULAR; INTRALESIONAL; INTRAMUSCULAR; SOFT TISSUE ONCE
Status: COMPLETED | OUTPATIENT
Start: 2024-09-24 | End: 2024-09-24

## 2024-09-24 RX ORDER — MIDAZOLAM HYDROCHLORIDE 1 MG/ML
1 INJECTION INTRAMUSCULAR; INTRAVENOUS ONCE AS NEEDED
Status: DISCONTINUED | OUTPATIENT
Start: 2024-09-24 | End: 2024-09-24 | Stop reason: HOSPADM

## 2024-09-24 RX ORDER — IOPAMIDOL 408 MG/ML
10 INJECTION, SOLUTION INTRATHECAL
Status: COMPLETED | OUTPATIENT
Start: 2024-09-24 | End: 2024-09-24

## 2024-09-24 RX ORDER — DEXAMETHASONE SODIUM PHOSPHATE 10 MG/ML
10 INJECTION INTRAMUSCULAR; INTRAVENOUS ONCE
Status: COMPLETED | OUTPATIENT
Start: 2024-09-24 | End: 2024-09-24

## 2024-09-24 RX ORDER — METHYLPREDNISOLONE 4 MG
TABLET, DOSE PACK ORAL
Qty: 21 TABLET | Refills: 0 | Status: SHIPPED | OUTPATIENT
Start: 2024-09-24

## 2024-09-24 RX ORDER — METHOCARBAMOL 500 MG/1
500 TABLET, FILM COATED ORAL 3 TIMES DAILY
Qty: 90 TABLET | Refills: 0 | Status: SHIPPED | OUTPATIENT
Start: 2024-09-24 | End: 2024-10-24

## 2024-09-24 RX ADMIN — IOPAMIDOL 10 ML: 408 INJECTION, SOLUTION INTRATHECAL at 12:24

## 2024-09-24 RX ADMIN — DEXAMETHASONE SODIUM PHOSPHATE 10 MG: 10 INJECTION INTRAMUSCULAR; INTRAVENOUS at 10:44

## 2024-09-24 RX ADMIN — SENNOSIDES AND DOCUSATE SODIUM 2 TABLET: 50; 8.6 TABLET ORAL at 09:03

## 2024-09-24 RX ADMIN — Medication 10 ML: at 09:03

## 2024-09-24 RX ADMIN — METHOCARBAMOL TABLETS 750 MG: 750 TABLET, COATED ORAL at 09:03

## 2024-09-24 RX ADMIN — METHYLPREDNISOLONE ACETATE 80 MG: 80 INJECTION, SUSPENSION INTRA-ARTICULAR; INTRALESIONAL; INTRAMUSCULAR; SOFT TISSUE at 12:25

## 2024-09-24 NOTE — PLAN OF CARE
Goal Outcome Evaluation:  Plan of Care Reviewed With: patient           Outcome Evaluation: Pt is a 74 yo M who was admitted with low back pain. Pt presents to PT with impaired functional mobility and gait secondary to generalized weakness, pain, and decreased activity tolerance. Pt reports incresed pain with B seated LAQ (R worse than L) and with increased gait distance. Pt with rigid postures during gait and limited gait distance secondary to pain. Pt may benefit from skilled PT to address strength, mobility, and gait.      Anticipated Discharge Disposition (PT): home with assist, home with outpatient therapy services

## 2024-09-24 NOTE — DISCHARGE INSTRUCTIONS

## 2024-09-24 NOTE — DISCHARGE SUMMARY
ED OBSERVATION PROGRESS/DISCHARGE SUMMARY    Date of Admission: 9/23/2024   LOS: 0 days   PCP: Rob Travis MD    Final Diagnosis lower back pain      Subjective     Hospital Outcome:     Rob Shah is a pleasant afebrile ambulatory 73 y.o.  male who was admitted to the ED observation unit for further testing and evaluation of low back pain worsening over the past 9 days, described as sharp/stabbing in nature.  Patient stated he used to get lumbar epidural steroid injections which helped.      MRI of lumbar spine reported multilevel degenerative disease involving the lumbar spine with mild disc desiccation and loss of disc height at multiple levels. Mild foraminal stenosis is present bilaterally. There is no evidence of a focal herniation. Endplate degenerative  changes posterolaterally to the right at L4-L5 and a small annular tear posterolaterally to the right at L4-L5.     Neurosurgery to see patient in consultation this morning.     ROS:  General: no fevers, chills  Respiratory: no cough, dyspnea  Cardiovascular: no chest pain, palpitations  Abdomen: No abdominal pain, nausea, vomiting, or diarrhea  Neurologic: No focal weakness    9/24/2024.    2:33 PM.  Patient has had LESI with improvement.  States he would like to go home at this time.  He has been cleared to do so by neurosurgery.  No red flags or concerns on exam and MRI.  Neurosurgery does recommend Rx for Medrol Dosepak, Robaxin, and outpatient physical therapy follow-up.  Will prescribe these medications and place the referral at this time.  Patient knows to return to the ED with worsening symptoms or if he has any further concerns.    Objective   Physical Exam:  I have reviewed the vital signs.  Temp:  [97.5 °F (36.4 °C)-98.1 °F (36.7 °C)] 97.7 °F (36.5 °C)  Heart Rate:  [57-70] 59  Resp:  [16-18] 16  BP: (117-169)/(74-96) 154/86  General Appearance:    Alert, cooperative, no distress  Head:    Normocephalic, atraumatic  Eyes:    Sclerae  anicteric  Neck:   Supple, no mass  Lungs: Clear to auscultation bilaterally, respirations unlabored  Heart: Regular rate and rhythm, S1 and S2 normal, no murmur, rub or gallop  Abdomen:  Soft, nontender, bowel sounds active, nondistended  Extremities: No clubbing, cyanosis, or edema to lower extremities  Pulses:  2+ and symmetric in distal lower extremities  Skin: No rashes   Neurologic: Oriented x3, Normal strength to extremities    Results Review:    I have reviewed the labs, radiology results and diagnostic studies.    Results from last 7 days   Lab Units 09/24/24  0406   WBC 10*3/mm3 7.48   HEMOGLOBIN g/dL 13.8   HEMATOCRIT % 41.3   PLATELETS 10*3/mm3 252     Results from last 7 days   Lab Units 09/24/24  0406 09/23/24  1623   SODIUM mmol/L 138 141   POTASSIUM mmol/L 4.1 4.1   CHLORIDE mmol/L 103 104   CO2 mmol/L 23.4 27.3   BUN mg/dL 16 14   CREATININE mg/dL 1.06 1.09   CALCIUM mg/dL 9.2 9.3   BILIRUBIN mg/dL  --  0.4   ALK PHOS U/L  --  86   ALT (SGPT) U/L  --  20   AST (SGOT) U/L  --  23   GLUCOSE mg/dL 129* 104*     Imaging Results (Last 24 Hours)       Procedure Component Value Units Date/Time    [place] Guided Pain Management Spine [[id number]] Resulted: 09/24/24 1234     Updated: 09/24/24 1234    Narrative:      This procedure was auto-finalized with no dictation required.    MRI Lumbar Spine Without Contrast [[id number]] Collected: 09/23/24 1944     Updated: 09/23/24 2003    Narrative:      MRI LUMBAR SPINE WO CONTRAST-     HISTORY:  intractable low back pain; M54.50-Low back pain, unspecified     COMPARISON: MRI lumbar spine 4/29/2021     FINDINGS: The alignment of the lumbar spine is within normal limits.  There is mild disc desiccation and loss of disc height from L2-S1. A  hemangioma is appreciated involving the L1 vertebral body superiorly.  The conus is at L1 and the caudal aspect the spinal cord appears  unremarkable.     L1-L2: There is no evidence of a disc bulge or herniation.     L2-L3: A mild  central broad-based disc osteophyte complex is present  resulting in mild flattening of the ventral surface of the thecal sac.  Mild foraminal stenosis is present bilaterally secondary to extension of  a small disc osteophyte complex into the neural foramen.     L3-L4: A minimal central disc bulge is present. Mild foraminal stenosis  is present bilaterally secondary to extension of disc material into the  neural foramen.     L4-L5: A mild central broad-based disc osteophyte complex is present  which results in mild flattening of the ventral surface of the thecal  sac. Mild mild to moderate foraminal stenosis on the left and right  respectively is appreciated secondary to extension of the disc  osteophyte complex into the neural foramen. Mild endplate degenerative  changes are present posterolaterally to the right which are new versus  the prior examination. An annular tear is present posterolaterally to  the right which is new.     L5-S1: There is no evidence of a disc bulge or herniation.       Impression:      Multilevel degenerative disease involving the lumbar spine  as noted as described above overall similar to 4/29/2021 and including  mild disc desiccation and loss of disc height at multiple levels. Mild  foraminal stenosis is present bilaterally as described in detail above.  There is no evidence of a focal herniation. Endplate degenerative  changes posterolaterally to the right at L4-L5 and a small annular tear  posterolaterally to the right at L4-L5 are appreciated which are new  versus 4/29/2021.        This report was finalized on 9/23/2024 8:00 PM by Dr. Geovany Mtz M.D  on Workstation: [id number]       CT Lumbar Spine Without Contrast [[id number]] Collected: 09/23/24 1515     Updated: 09/23/24 1541    Narrative:      CT SCAN OF THE LUMBAR SPINE WITHOUT CONTRAST     CLINICAL HISTORY: Low back pain.     TECHNIQUE: CT scan of the lumbar spine was obtained with a combination  of 3 , 2, and 1 mm axial  images. Bone and soft tissue algorithm images  were obtained with sagittal and coronal reconstructed images.     FINDINGS:     At T12-L1 and L1-2, there is no significant canal or foraminal stenosis.     At L2-3, there is a disc osteophyte complex which minimally indents the  ventral subarachnoid space and minimally narrows the neural foramina.     At L3-4, there is a minimal disc bulge but no significant canal or  foraminal narrowing is noted.     At L4-5, there is a disc osteophyte complex eccentric to the right which  results in a mild to moderate degree of foraminal narrowing in  conjunction with loss of foraminal height. Degenerative disc changes are  most prominently seen along the right side of the L4-5 disc space. There  is no significant degree of canal or left foraminal narrowing. Mild  facet hypertrophic changes are seen at L4-5.     At L5-S1, a disc osteophyte complex and loss of foraminal height mildly  narrows the neural foramina. Moderate degenerative disc changes are  identified. Mild facet hypertrophy is seen at L5-S1.       Impression:         There is essentially no significant degree of canal stenosis seen  throughout the lumbar spine. The most prominent foraminal stenosis is  noted within the right L4-5 neural foramen where there is a mild to  moderate degree of foraminal stenosis secondary to a disc osteophyte  complex and loss of foraminal height as well as the bilateral L5-S1  neural foramina where loss of foraminal height and disc osteophyte  complex result in relatively mild degrees of foraminal stenosis.     The remaining degenerative phenomena within the lumbar spine are as  discussed in detail above.        Radiation dose reduction techniques were utilized, including automated  exposure control and exposure modulation based on body size.     This report was finalized on 9/23/2024 3:38 PM by Dr. Christopher Bates M.D  on Workstation: [id number]               I have reviewed the  medications.  ---------------------------------------------------------------------------------------------  Assessment & Plan   Assessment/Problem List    Low back pain    Lumbar degenerative disc disease    Annular tear of lumbar disc      Plan:    Back pain  Multimodal analgesia with Toradol, Robaxin and Percocet  Consult to neurosurgery team  Regular diet, n.p.o. after midnight  CT lumbar spine does not show any acute fracture  MRI lumbar spine showed degenerative changes    Disposition: Discharge    Follow-up after Discharge: Primary care    This note will serve as a discharge summary and progress note    Marv Hercules III, PA 09/24/24 14:34 EDT    I have worn appropriate PPE during this patient encounter, sanitized my hands both with entering and exiting patient's room.      51 minutes has been spent by [facility] providers in the care of this patient while under observation status

## 2024-09-24 NOTE — PLAN OF CARE
Goal Outcome Evaluation:            Pt admitted for further evaluation and treatment of low back pain. Pt noted to be A&Ox4, able to make needs known, without new complaints, and vitals within defined parameters. MRI L-spine obtained. Neurosurgery to see today. Pt reporting that pain is mainly with movement about the waist and that it is sharp in nature but relieves with rest/repositioning, pt aware PRN analgesia is available. Pt aware of and agreeable to ongoing treatment plan.

## 2024-09-24 NOTE — PLAN OF CARE
Goal Outcome Evaluation:              Outcome Evaluation: pt was discharged from the obs unit with all belongings and discharge paperwork, pt to follow up with pcp and nuerosurgery as needed, pt recieved medications from meds to bed, pt had no further questions at the time of discharge, wife provided transport

## 2024-09-24 NOTE — H&P
HISTORY:  Mated to the emergency room for intractable back pain without radiation.  MRI shows displaced disc at L4 which causes spinal stenosis.  He has received steroids and pain medicines.  In 2016 he did have an epidural injection in our clinic with good success.  He was referred for an epidural steroid injection    No current facility-administered medications on file prior to encounter.     Current Outpatient Medications on File Prior to Encounter   Medication Sig Dispense Refill    alfuzosin (UROXATRAL) 10 MG 24 hr tablet Take 1 tablet by mouth every night at bedtime.      ascorbic acid (VITAMIN C) 500 MG tablet Take 1 tablet by mouth Daily With Dinner.      atorvastatin (LIPITOR) 40 MG tablet Take 1 tablet by mouth Daily. 90 tablet 1    Cholecalciferol (Vitamin D3) 50 MCG (2000 UT) tablet Take 1 tablet by mouth Daily With Dinner.      diclofenac (VOLTAREN) 75 MG EC tablet Take 1 tablet by mouth 2 (Two) Times a Day. 30 tablet 2    fluticasone (FLONASE) 50 MCG/ACT nasal spray 2 sprays into the nostril(s) as directed by provider Daily. 48 g 3    HYDROcodone-acetaminophen (Norco) 7.5-325 MG per tablet Take 1 tablet by mouth Daily As Needed for Moderate Pain. 40 tablet 0    levothyroxine (Synthroid) 100 MCG tablet Take 1 tablet by mouth Daily. 90 tablet 1    ondansetron ODT (ZOFRAN-ODT) 4 MG disintegrating tablet Place 1 tablet on the tongue Every 8 (Eight) Hours As Needed for Nausea or Vomiting. 30 tablet 5    Zinc 50 MG capsule Take 1 tablet by mouth Daily With Dinner.      baclofen (LIORESAL) 20 MG tablet Take 1 tablet by mouth Every Night. (Patient taking differently: Take 1 tablet by mouth As Needed.) 90 tablet 1    valACYclovir (VALTREX) 1000 MG tablet TAKE 2 TABLETS EVERY TWELVE HOURS FOR 2 DOSES AS NEEDED (Patient taking differently: As Needed. TAKE 2 TABLETS EVERY TWELVE HOURS FOR 2 DOSES AS NEEDED) 40 tablet 3       Past Medical History:   Diagnosis Date    Allergic rhinitis     CHRONIC, D/T POLLEN     "Bilateral tinnitus     BPH (benign prostatic hyperplasia)     FOLLOWED BY DR. ROSE LANZA    CKD (chronic kidney disease)     STAGE II, FOLLOWED BY DR. MAX NOEL    Colon polyps     FOLLOWED BY DR. MARCUS BEGUM    DDD (degenerative disc disease), cervical     DDD (degenerative disc disease), lumbosacral     CHRONIC LUMBAR PAIN    Hyperlipidemia     MIXED    Hypothyroidism, acquired     Liver hemangioma 01/18/2016    FOLLOWED BY DR. MAX NOEL    Nodular prostate without urinary obstruction     FOLLOWED BY DR. ROSE LANZA    Osteoarthritis     Sleep apnea     DOES NOT WEAR CPAP       No hematologic infectious or constitutional symptoms  Positive WILILAM screen/DX:  2 or more mitigating factors  Recovery in non-supine position, no use of neuromuscular blockade        Exam:  /77 (BP Location: Left arm, Patient Position: Lying)   Pulse 58   Temp 36.5 °C (97.7 °F) (Oral)   Resp 16   Ht 175.3 cm (69\")   Wt 92.5 kg (204 lb)   SpO2 95%   BMI 30.13 kg/m²   Airway Mallampatti 2  Alert and oriented      Diagnosis:  Post-Op Diagnosis Codes:     * Intervertebral disc stenosis of neural canal of lumbar region [M99.53]    Plan:  Lumbar 4 epidural steroid injection under fluoroscopic guidance    I have encouraged them to continue:  1.  Physical therapy exercises at home as prescribed by physical therapy or from the pain clinic handout.  Continuation of these exercises every day, or multiple times per week, even when the patient has good pain relief, was stressed to the patient as a preventative measure to decrease the frequency and severity of future pain episodes.  2.  Continue pain medicines as already prescribed.  If patient not currently taking any, it is recommended to begin Acetaminophen 1000 mg po q 8 hours.  If other medicines containing Acetaminophen are currently prescribed, maintain daily dose at 3000mg.    3.  If they can tolerate NSAIDS, it is recommended to take Ibuprofen 600 mg " po q 6 hours for 7 days during pain exacerbations.   Alternatively, they may substitute an NSAID of their choice (e.g. Aleve)  4.  Heat and ice to the affected area as tolerated for pain control.   5.  Low impact exercise such as walking or water exercise was recommended to maintain overall health and aid in weight control.   6.  Follow up as needed for subsequent injections.

## 2024-09-24 NOTE — CONSULTS
[facility] NEUROSURGERY CONSULT NOTE    Patient name: Rbo Shah  Referring Provider: Tawnya Berry APRN  Reason for Consultation: Lumbar pain     Patient Care Team:  Rob Travis MD as PCP - General  Rob Travis MD as PCP - Family Medicine  Demetrius Ramon MD as Consulting Physician (Urology)    Chief complaint: Lumbar pain     Subjective .     History of present illness:    Patient is a 73 y.o. male with a history of BPH, CKD, WILLIAM, hyperlipidemia, hypothyroidism degenerative disc disease in both the cervical and lumbar spine.  He has no history of prior cervical or lumbar spine surgery.  He has seen Dr. Ti Damon in the past and had managed relatively well with epidural steroid injections.  His last epidural injection was performed in August 2016.  His symptoms had been fairly well-managed until Sunday, September 15th when he experienced severe, unrelenting right greater than left sided low back pain after bending forward to put on a shoe.  His symptoms did not get better with rest.  The symptoms worsened with any standing, sitting, walking. He denied any radiating leg pain, leg weakness or numbness/tingling in the legs.  He also denied any bowel or bladder incontinence.  He presented to [facility] emergency department September 23rd around noon for these complaints. He underwent MRI imaging of the lumbar spine without contrast and received a 4 mg dose of IV Decadron.  Neurosurgery has been asked to evaluate and give further recommendations.    Review of Systems  Review of Systems   Constitutional:  Positive for activity change. Negative for chills, diaphoresis and fever.   HENT: Negative.     Eyes: Negative.    Respiratory: Negative.  Negative for cough and shortness of breath.    Cardiovascular: Negative.  Negative for chest pain and palpitations.   Gastrointestinal: Negative.  Negative for constipation, diarrhea and nausea.        Denies any history of bowel incontinence.    Endocrine: Negative.    Genitourinary: Negative.  Negative for difficulty urinating, dysuria, frequency and urgency.        Denies any history of urinary incontinence.   Musculoskeletal:  Positive for back pain.   Skin: Negative.    Allergic/Immunologic: Negative.    Neurological:  Negative for dizziness, weakness and numbness.        No history of falls or trauma.  Patient states back pain symptoms started September 15 after leaning forward to put on a shoe.   Hematological: Negative.    Psychiatric/Behavioral: Negative.         History  PAST MEDICAL HISTORY  Past Medical History:   Diagnosis Date    Allergic rhinitis     CHRONIC, D/T POLLEN    Bilateral tinnitus     BPH (benign prostatic hyperplasia)     FOLLOWED BY DR. ROSE LANZA    CKD (chronic kidney disease)     STAGE II, FOLLOWED BY DR. MAX NOEL    Colon polyps     FOLLOWED BY DR. MARCUS BEGUM    DDD (degenerative disc disease), cervical     DDD (degenerative disc disease), lumbosacral     CHRONIC LUMBAR PAIN    Hyperlipidemia     MIXED    Hypothyroidism, acquired     Liver hemangioma 01/18/2016    FOLLOWED BY DR. MAX NOEL    Nodular prostate without urinary obstruction     FOLLOWED BY DR. ROSE LANZA    Osteoarthritis     Sleep apnea     DOES NOT WEAR CPAP       PAST SURGICAL HISTORY  Past Surgical History:   Procedure Laterality Date    CARDIAC CATHETERIZATION  2003    D/T CP AND ABNML LABS    COLONOSCOPY N/A 10/18/2011    ENTIRE COLON WNL, RESCOPE IN 5 YRS, DR.RAYMOND BELLO AT [facility]    COLONOSCOPY N/A 11/09/2016    3 TUBULAR ADENOMA POLYPS IN CECUM, 2 TUBULAR ADENOMA POLYPS IN ASCENDING, 7 MM TUBULAR ADENOMA POLYP IN HEPATIC FLEXURE, RESCOPE IN 3 YRS, DR. MARCUS BEGUM AT [facility]    COLONOSCOPY N/A 11/14/2019    3 TUBULAR ADENOMA POLYPS IN TRANSVERSE, RESCOPE IN 3 YRS, DR. MARCUS BEGUM AT [facility]    COLONOSCOPY N/A 11/16/2022    4 MM BENIGN POLYP IN SIGMOID, MULTIPLE SMALL AND LARGE DIVERTICULA IN SIGMOID, RESCOPE IN 5 YRS,   MARCUS BEGUM AT [facility]    ENDOSCOPY      ENDOSCOPY AND COLONOSCOPY N/A 2005    NO RECORDS    HAND SURGERY Right     HERNIA REPAIR N/A     EPIGASTRIC HERNIA REPAIR    KNEE ARTHROSCOPY Left 2020    Procedure: LEFT KNEE ARTHROSCOPY. PARTIAL MEDIAL MENISECTOMY;  Surgeon: Juan Smith MD;  Location:  [facility] [place];  Service: Orthopedics;  Laterality: Left;    LUMBAR EPIDURAL INJECTION N/A 2016    DR. THOMAS CASEY AT [facility]    LUMBAR EPIDURAL INJECTION N/A 2011    DR. SULLY HERNANDEZ AT [facility]    LUMBAR EPIDURAL INJECTION N/A 2011    DR. BRENT DUNN AT [facility]    LUMBAR EPIDURAL INJECTION N/A 10/07/2010    DR. JAY CHAMORRO AT [facility]    LUMBAR EPIDURAL INJECTION N/A 2010    DR. FREDDY MEJIA AT [facility]    LUMBAR EPIDURAL INJECTION N/A 2010    DR. FREDDY MEJIA AT [facility]    LUMBAR EPIDURAL INJECTION N/A 2009    DR. JAY CHAMORRO AT [facility]    LUMBAR EPIDURAL INJECTION N/A 2009    DR. BRIAN MELVIN AT [facility]    LUMBAR EPIDURAL INJECTION N/A 2007    DR. JAY CHAMORRO AT [facility]    LUMBAR EPIDURAL INJECTION N/A 2007    DR. AGUSTIN GONZALES AT [facility]    LUMBAR EPIDURAL INJECTION N/A 2007    DR. KENDRA CHANG AT [facility]    LUMBAR EPIDURAL INJECTION N/A 2006    DR. LÓPEZ DACOSTA AT [facility]    LUMBAR EPIDURAL INJECTION N/A 2006    DR. KENDRA CHANG AT [facility]    PROSTATE BIOPSY      X2    VASECTOMY N/A 1972       FAMILY HISTORY  Family History   Problem Relation Age of Onset    Heart failure Mother     Kidney disease Mother     Heart disease Mother     Cancer Father         BRAIN    Brain cancer Father     Colon cancer Paternal Uncle     Cancer Paternal Uncle     Malig Hyperthermia Neg Hx        SOCIAL HISTORY  Social History     Tobacco Use    Smoking status: Former     Current packs/day: 0.00     Average packs/day: 1 pack/day for 30.0 years (30.0 ttl pk-yrs)     Types: Cigarettes     Start date:      Quit date:      Years since quittin.7    Smokeless tobacco: Never   Vaping Use     Vaping status: Never Used   Substance Use Topics    Alcohol use: Not Currently     Comment: RARELY    Drug use: No     , lives with wife  retired    Allergies:  Penicillins    MEDICATIONS:  Medications Prior to Admission   Medication Sig Dispense Refill Last Dose    alfuzosin (UROXATRAL) 10 MG 24 hr tablet Take 1 tablet by mouth every night at bedtime.   9/22/2024    ascorbic acid (VITAMIN C) 500 MG tablet Take 1 tablet by mouth Daily With Dinner.   9/22/2024    atorvastatin (LIPITOR) 40 MG tablet Take 1 tablet by mouth Daily. 90 tablet 1 9/22/2024    Cholecalciferol (Vitamin D3) 50 MCG (2000 UT) tablet Take 1 tablet by mouth Daily With Dinner.   9/22/2024    diclofenac (VOLTAREN) 75 MG EC tablet Take 1 tablet by mouth 2 (Two) Times a Day. 30 tablet 2 9/23/2024    fluticasone (FLONASE) 50 MCG/ACT nasal spray 2 sprays into the nostril(s) as directed by provider Daily. 48 g 3     HYDROcodone-acetaminophen (Norco) 7.5-325 MG per tablet Take 1 tablet by mouth Daily As Needed for Moderate Pain. 40 tablet 0     levothyroxine (Synthroid) 100 MCG tablet Take 1 tablet by mouth Daily. 90 tablet 1     ondansetron ODT (ZOFRAN-ODT) 4 MG disintegrating tablet Place 1 tablet on the tongue Every 8 (Eight) Hours As Needed for Nausea or Vomiting. 30 tablet 5 9/23/2024    Zinc 50 MG capsule Take 1 tablet by mouth Daily With Dinner.   9/22/2024    baclofen (LIORESAL) 20 MG tablet Take 1 tablet by mouth Every Night. (Patient taking differently: Take 1 tablet by mouth As Needed.) 90 tablet 1     valACYclovir (VALTREX) 1000 MG tablet TAKE 2 TABLETS EVERY TWELVE HOURS FOR 2 DOSES AS NEEDED (Patient taking differently: As Needed. TAKE 2 TABLETS EVERY TWELVE HOURS FOR 2 DOSES AS NEEDED) 40 tablet 3          Current Facility-Administered Medications:     sennosides-docusate (PERICOLACE) 8.6-50 MG per tablet 2 tablet, 2 tablet, Oral, BID, 2 tablet at 09/24/24 0903 **AND** polyethylene glycol (MIRALAX) packet 17 g, 17 g, Oral, Daily PRN  **AND** bisacodyl (DULCOLAX) EC tablet 5 mg, 5 mg, Oral, Daily PRN **AND** bisacodyl (DULCOLAX) suppository 10 mg, 10 mg, Rectal, Daily PRN, Jamal, Tawnya, APRN    dexAMETHasone (DECADRON) injection 10 mg, 10 mg, Intravenous, Once, Marv Hercules III, PA    ketorolac (TORADOL) injection 15 mg, 15 mg, Intravenous, Q6H PRN, Jamal, Tawnya, APRN, 15 mg at 09/23/24 2146    methocarbamol (ROBAXIN) tablet 750 mg, 750 mg, Oral, 4x Daily, Hermontana, Tawnya, APRN, 750 mg at 09/24/24 0903    oxyCODONE-acetaminophen (PERCOCET) 5-325 MG per tablet 1 tablet, 1 tablet, Oral, Q4H PRN, Jamal, Tawnya, APRN    [COMPLETED] Insert Peripheral IV, , , Once **AND** sodium chloride 0.9 % flush 10 mL, 10 mL, Intravenous, PRN, Valerie Nguyen PA-C    sodium chloride 0.9 % flush 10 mL, 10 mL, Intravenous, Q12H, Hermontana, Tawnya, APRN, 10 mL at 09/24/24 0903    sodium chloride 0.9 % flush 10 mL, 10 mL, Intravenous, PRN, Herth, Tawnya, APRN    sodium chloride 0.9 % infusion 40 mL, 40 mL, Intravenous, PRN, Hermontana, Tawnya, APRN      Objective     Results Review:  LABS:  Results from last 7 days   Lab Units 09/24/24  0406 09/23/24  1623   WBC 10*3/mm3 7.48 7.62   HEMOGLOBIN g/dL 13.8 14.0   HEMATOCRIT % 41.3 41.0   PLATELETS 10*3/mm3 252 263     Results from last 7 days   Lab Units 09/24/24  0406 09/23/24  1623   SODIUM mmol/L 138 141   POTASSIUM mmol/L 4.1 4.1   CHLORIDE mmol/L 103 104   CO2 mmol/L 23.4 27.3   BUN mg/dL 16 14   CREATININE mg/dL 1.06 1.09   CALCIUM mg/dL 9.2 9.3   BILIRUBIN mg/dL  --  0.4   ALK PHOS U/L  --  86   ALT (SGPT) U/L  --  20   AST (SGOT) U/L  --  23   GLUCOSE mg/dL 129* 104*     DIAGNOSTICS:    MRI LUMBAR SPINE WO CONTRAST 09/23/2024    Conus terminates at L1.  No compression of cauda equina.  Multilevel degenerative changes noted in the lumbar spine with a new endplate changes and right L4-5 annular tear.  Mild to moderate foraminal stenosis noted left greater than right at L4-5.  No evidence of disc herniation or severe  canal stenosis    Results Review:   I reviewed the patient's new clinical results.  I personally viewed and interpreted the patient's lumbar MRI and discussed results with Dr. Rao, patient and his wife.     Vital Signs   Temp:  [97 °F (36.1 °C)-98.1 °F (36.7 °C)] 97.5 °F (36.4 °C)  Heart Rate:  [57-77] 57  Resp:  [16-18] 16  BP: (117-169)/(68-87) 152/83    Physical Exam:  Physical Exam  Vitals reviewed.   Constitutional:       General: He is not in acute distress.     Appearance: Normal appearance. He is well-developed and normal weight. He is not ill-appearing, toxic-appearing or diaphoretic.   HENT:      Head: Normocephalic and atraumatic.      Right Ear: External ear normal.      Left Ear: External ear normal.      Nose: Nose normal.      Mouth/Throat:      Pharynx: Oropharynx is clear.   Eyes:      General:         Right eye: No discharge.         Left eye: No discharge.      Conjunctiva/sclera: Conjunctivae normal.   Neck:      Trachea: No tracheal deviation.   Cardiovascular:      Rate and Rhythm: Normal rate.   Pulmonary:      Effort: Pulmonary effort is normal. No respiratory distress.   Abdominal:      General: Abdomen is flat. There is no distension.      Palpations: Abdomen is soft.      Tenderness: There is no abdominal tenderness.   Musculoskeletal:         General: No swelling or tenderness. Normal range of motion.      Cervical back: Normal range of motion and neck supple. No rigidity or tenderness.      Right lower leg: No edema.      Left lower leg: No edema.   Skin:     General: Skin is warm and dry.      Findings: No erythema.   Neurological:      Mental Status: He is alert and oriented to person, place, and time.      GCS: GCS eye subscore is 4. GCS verbal subscore is 5. GCS motor subscore is 6.      Sensory: No sensory deficit.      Motor: No weakness or abnormal muscle tone.      Coordination: Coordination normal.      Gait: Gait normal.      Deep Tendon Reflexes: Reflexes are normal and  symmetric. Reflexes normal.      Comments: AA&O x 3. Standing in room when I entered. No motor or sensory deficits. DTR's 2+ bilateral upper extremities and bilateral lower extremities except for 3+ bilateral patellar reflexes. Negative Gallardo's and negative clonus bilaterally. SLR and Barrie's negative bilaterally.  Able to bear weight on heels and toes bilaterally.  Observed gait walking from the observation room to the bathroom.  No evidence of instability noted.     Psychiatric:         Behavior: Behavior is cooperative.         Thought Content: Thought content normal.       Neurological Exam  Mental Status  Alert. Oriented to person, place, and time.    Reflexes  Deep tendon reflexes are 2+ and symmetric in all four extremities.    Gait   Normal gait.      Assessment & Plan       Low back pain    Lumbar degenerative disc disease    Annular tear of lumbar disc        Problem List Items Addressed This Visit          Unprioritized    * (Principal) Low back pain - Primary        COMORBID CONDITIONS:  Hypertension and Obstructive Sleep Apnea    PLAN:     Below plan discussed with Dr. Rao and Kev Hercules PA-C as well as patient. All are in agreement with plan.    Repeat IV dose of Decadron now  Inpatient consult for lumbar DARIANA in pain management today.    If doing well after the DARIANA, recommend d/c home with the following:    Rx for medrol dose pack  Rx for muscle relaxer for prn use  Rx for outpatient physical therapy    - Instructed patient and wife to call Neurosurgery office for re-evaluation if pain does not improve with the above.   - For any further questions before discharge, please contact neurosurgery.    I discussed the patient's findings and my recommendations with patient, family, and consulting provider    During patient visit, I utilized appropriate personal protective equipment including gloves and mask.  Mask used was standard procedure mask. Appropriate PPE was worn during the entire visit.  " Hand hygiene was completed before and after.     Loreta Mccall, APRN  09/24/24  10:15 EDT    \"Dictated utilizing Dragon dictation\".    "

## 2024-09-24 NOTE — THERAPY EVALUATION
Patient Name: Rob Shah  : 1950    MRN: [id number]                              Today's Date: 2024       Admit Date: 2024    Visit Dx:     ICD-10-CM ICD-9-CM   1. Acute midline low back pain without sciatica  M54.50 724.2     Patient Active Problem List   Diagnosis    Degeneration of intervertebral disc of lumbosacral region    ED (erectile dysfunction)    Hypothyroidism, acquired    Insomnia    Nodular prostate without urinary obstruction    Osteoarthritis    Hyperlipidemia    Testicular hypofunction    Bilateral tinnitus    Allergic rhinitis due to pollen    Nausea    Neck pain    Pain of both shoulder joints    Degenerative disc disease, cervical    History of colon polyps    Herpes simplex    Artificial lens present    Keratoconjunctivitis sicca not specified as Sjogren's    Low back pain     Past Medical History:   Diagnosis Date    Allergic rhinitis     CHRONIC, D/T POLLEN    Bilateral tinnitus     BPH (benign prostatic hyperplasia)     FOLLOWED BY DR. ROSE LANZA    CKD (chronic kidney disease)     STAGE II, FOLLOWED BY DR. MAX NOEL    Colon polyps     FOLLOWED BY DR. MARCUS BEGUM    DDD (degenerative disc disease), cervical     DDD (degenerative disc disease), lumbosacral     CHRONIC LUMBAR PAIN    Hyperlipidemia     MIXED    Hypothyroidism, acquired     Liver hemangioma 2016    FOLLOWED BY DR. MAX NOEL    Nodular prostate without urinary obstruction     FOLLOWED BY DR. ROSE LANZA    Osteoarthritis     Sleep apnea     DOES NOT WEAR CPAP     Past Surgical History:   Procedure Laterality Date    CARDIAC CATHETERIZATION      D/T CP AND ABNML LABS    COLONOSCOPY N/A 10/18/2011    ENTIRE COLON WNL, RESCOPE IN 5 YRS, DR.RAYMOND BELLO AT [facility]    COLONOSCOPY N/A 2016    3 TUBULAR ADENOMA POLYPS IN CECUM, 2 TUBULAR ADENOMA POLYPS IN ASCENDING, 7 MM TUBULAR ADENOMA POLYP IN HEPATIC FLEXURE, RESCOPE IN 3 YRS, DR. MARCUS BEGUM AT [facility]    COLONOSCOPY  N/A 11/14/2019    3 TUBULAR ADENOMA POLYPS IN TRANSVERSE, RESCOPE IN 3 YRS, DR. MARCUS BEGUM AT [facility]    COLONOSCOPY N/A 11/16/2022    4 MM BENIGN POLYP IN SIGMOID, MULTIPLE SMALL AND LARGE DIVERTICULA IN SIGMOID, RESCOPE IN 5 YRS, DR. MARCUS BEGUM AT [facility]    ENDOSCOPY      ENDOSCOPY AND COLONOSCOPY N/A 09/01/2005    NO RECORDS    HAND SURGERY Right 1961    HERNIA REPAIR N/A 1978    EPIGASTRIC HERNIA REPAIR    KNEE ARTHROSCOPY Left 12/02/2020    Procedure: LEFT KNEE ARTHROSCOPY. PARTIAL MEDIAL MENISECTOMY;  Surgeon: Juan Smith MD;  Location:  [facility];  Service: Orthopedics;  Laterality: Left;    LUMBAR EPIDURAL INJECTION N/A 08/11/2016    DR. THOMAS CASEY AT [facility]    LUMBAR EPIDURAL INJECTION N/A 06/07/2011    DR. SULLY HERNANDEZ AT [facility]    LUMBAR EPIDURAL INJECTION N/A 01/11/2011    DR. BRENT DUNN AT [facility]    LUMBAR EPIDURAL INJECTION N/A 10/07/2010    DR. JAY CHAMORRO AT [facility]    LUMBAR EPIDURAL INJECTION N/A 04/05/2010    DR. FREDDY MEJIA AT [facility]    LUMBAR EPIDURAL INJECTION N/A 03/24/2010    DR. FREDDY MEJIA AT [facility]    LUMBAR EPIDURAL INJECTION N/A 08/26/2009    DR. JAY CHAMORRO AT [facility]    LUMBAR EPIDURAL INJECTION N/A 08/19/2009    DR. BRIAN MELVIN AT [facility]    LUMBAR EPIDURAL INJECTION N/A 06/28/2007    DR. JAY CHAMORRO AT [facility]    LUMBAR EPIDURAL INJECTION N/A 07/05/2007    DR. AGUSTIN GONZALES AT [facility]    LUMBAR EPIDURAL INJECTION N/A 07/12/2007    DR. KENDRA CHANG AT [facility]    LUMBAR EPIDURAL INJECTION N/A 06/20/2006    DR. LÓPEZ DACOSTA AT [facility]    LUMBAR EPIDURAL INJECTION N/A 01/26/2006    DR. KENDRA CHANG AT [facility]    PROSTATE BIOPSY      X2    VASECTOMY N/A 1972      General Information       Row Name 09/24/24 1000          Physical Therapy Time and Intention    Document Type evaluation  -CH     Mode of Treatment individual therapy;physical therapy  -       Row Name 09/24/24 1000          General Information    Prior Level of Function independent:;gait;transfer;bed mobility  -     Existing Precautions/Restrictions fall   -       Row Name 09/24/24 1000          Living Environment    People in Home spouse  -       Row Name 09/24/24 1000          Cognition    Orientation Status (Cognition) oriented x 4  -       Row Name 09/24/24 1000          Safety Issues, Functional Mobility    Impairments Affecting Function (Mobility) pain;endurance/activity tolerance  -               User Key  (r) = Recorded By, (t) = Taken By, (c) = Cosigned By      Initials Name Provider Type     Rosalva Koch, PT Physical Therapist                   Mobility       Row Name 09/24/24 1001          Bed Mobility    Bed Mobility supine-sit;sit-supine  -     Supine-Sit [place] (Bed Mobility) verbal cues;nonverbal cues (demo/gesture);contact guard  -     Sit-Supine [place] (Bed Mobility) verbal cues;nonverbal cues (demo/gesture);contact guard  -     Assistive Device (Bed Mobility) bed rails;head of bed elevated  -     Comment, (Bed Mobility) pt instructed on log rolling  -       Row Name 09/24/24 1001          Sit-Stand Transfer    Sit-Stand [place] (Transfers) verbal cues;nonverbal cues (demo/gesture);contact guard  -       Row Name 09/24/24 1001          Gait/Stairs (Locomotion)    [place] Level (Gait) verbal cues;nonverbal cues (demo/gesture);contact guard  -     Distance in Feet (Gait) 50  -     Deviations/Abnormal Patterns (Gait) yeny decreased;gait speed decreased;stride length decreased  -     Bilateral Gait Deviations forward flexed posture  -     Comment, (Gait/Stairs) pt with very rigid posture, gait distance limited secondary to pain  -               User Key  (r) = Recorded By, (t) = Taken By, (c) = Cosigned By      Initials Name Provider Type     Rosalva Koch, PT Physical Therapist                   Obj/Interventions       Row Name 09/24/24 1003          Range of Motion Comprehensive    General Range of Motion no range of motion deficits identified  -       Row Name 09/24/24 1003           Strength Comprehensive (MMT)    Comment, General Manual Muscle Testing (MMT) Assessment mild generalized weakness noted secondary to pain  -       Row Name 09/24/24 1003          Balance    Balance Assessment standing static balance;standing dynamic balance  -     Static Standing Balance contact guard;non-verbal cues (demo/gesture)  -CH     Dynamic Standing Balance contact guard;non-verbal cues (demo/gesture)  -               User Key  (r) = Recorded By, (t) = Taken By, (c) = Cosigned By      Initials Name Provider Type     Rosalva Koch, PT Physical Therapist                   Goals/Plan       Row Name 09/24/24 1007          Bed Mobility Goal 1 (PT)    Activity/Assistive Device (Bed Mobility Goal 1, PT) bed mobility activities, all  -CH     [place] Level/Cues Needed (Bed Mobility Goal 1, PT) independent  -CH     Time Frame (Bed Mobility Goal 1, PT) 1 week  -CH       Row Name 09/24/24 1007          Transfer Goal 1 (PT)    Activity/Assistive Device (Transfer Goal 1, PT) transfers, all  -CH     [place] Level/Cues Needed (Transfer Goal 1, PT) independent  -CH     Time Frame (Transfer Goal 1, PT) 1 week  -CH       Row Name 09/24/24 1007          Gait Training Goal 1 (PT)    Activity/Assistive Device (Gait Training Goal 1, PT) gait (walking locomotion)  -CH     [place] Level (Gait Training Goal 1, PT) independent  -CH     Distance (Gait Training Goal 1, PT) 150  -CH     Time Frame (Gait Training Goal 1, PT) 1 week  -CH       Row Name 09/24/24 1007          Therapy Assessment/Plan (PT)    Planned Therapy Interventions (PT) balance training;bed mobility training;gait training;home exercise program;patient/family education;strengthening;transfer training  -               User Key  (r) = Recorded By, (t) = Taken By, (c) = Cosigned By      Initials Name Provider Type    Rosalva Urrutia PT Physical Therapist                   Clinical Impression       Row Name 09/24/24 1003          Pain     Pretreatment Pain Rating 2/10  -     Posttreatment Pain Rating 4/10  -     Pain Location lower  -     Pain Location - back  -     Pain Intervention(s) Repositioned  -       Row Name 09/24/24 1003          Plan of Care Review    Plan of Care Reviewed With patient  -     Outcome Evaluation Pt is a 74 yo M who was admitted with low back pain. Pt presents to PT with impaired functional mobility and gait secondary to generalized weakness, pain, and decreased activity tolerance. Pt reports incresed pain with B seated LAQ (R worse than L) and with increased gait distance. Pt with rigid postures during gait and limited gait distance secondary to pain. Pt may benefit from skilled PT to address strength, mobility, and gait.  -       Row Name 09/24/24 1003          Therapy Assessment/Plan (PT)    Patient/Family Therapy Goals Statement (PT) to return to PLOF  -     Rehab Potential (PT) good, to achieve stated therapy goals  -     Criteria for Skilled Interventions Met (PT) skilled treatment is necessary  -     Therapy Frequency (PT) 5 times/wk  -       Row Name 09/24/24 1003          Positioning and Restraints    Pre-Treatment Position in bed  -     Post Treatment Position bed  -     In Bed supine;call light within reach;encouraged to call for assist;with family/caregiver  -               User Key  (r) = Recorded By, (t) = Taken By, (c) = Cosigned By      Initials Name Provider Type     Rosalva Koch, PT Physical Therapist                   Outcome Measures       Row Name 09/24/24 1008          How much help from another person do you currently need...    Turning from your back to your side while in flat bed without using bedrails? 3  -CH     Moving from lying on back to sitting on the side of a flat bed without bedrails? 3  -CH     Moving to and from a bed to a chair (including a wheelchair)? 3  -CH     Standing up from a chair using your arms (e.g., wheelchair, bedside chair)? 3  -CH      Climbing 3-5 steps with a railing? 3  -     To walk in hospital room? 3  -     AM-PAC 6 Clicks Score (PT) 18  -     Highest Level of Mobility Goal 6 --> Walk 10 steps or more  -       Row Name 09/24/24 1008          Functional Assessment    Outcome Measure Options AM-PAC 6 Clicks Basic Mobility (PT)  -               User Key  (r) = Recorded By, (t) = Taken By, (c) = Cosigned By      Initials Name Provider Type     Rosalva Koch PT Physical Therapist                                 Physical Therapy Education       Title: PT OT SLP Therapies (In Progress)       Topic: Physical Therapy (In Progress)       Point: Mobility training (Done)       Learning Progress Summary             Patient Acceptance, E,TB,D, VU,NR by  at 9/24/2024 1009                         Point: Home exercise program (Not Started)       Learner Progress:  Not documented in this visit.              Point: Body mechanics (Done)       Learning Progress Summary             Patient Acceptance, E,TB,D, VU,NR by  at 9/24/2024 1009                         Point: Precautions (Done)       Learning Progress Summary             Patient Acceptance, E,TB,D, VU,NR by  at 9/24/2024 1009                                         User Key       Initials Effective Dates Name Provider Type [facility] 06/16/21 -  Rosalva Koch PT Physical Therapist PT                  PT Recommendation and Plan  Planned Therapy Interventions (PT): balance training, bed mobility training, gait training, home exercise program, patient/family education, strengthening, transfer training  Plan of Care Reviewed With: patient  Outcome Evaluation: Pt is a 72 yo M who was admitted with low back pain. Pt presents to PT with impaired functional mobility and gait secondary to generalized weakness, pain, and decreased activity tolerance. Pt reports incresed pain with B seated LAQ (R worse than L) and with increased gait distance. Pt with rigid postures during gait  and limited gait distance secondary to pain. Pt may benefit from skilled PT to address strength, mobility, and gait.     Time Calculation:         PT Charges       Row Name 09/24/24 1009             Time Calculation    Start Time 0900  -      Stop Time 0911  -      Time Calculation (min) 11 min  -      PT Received On 09/24/24  -      PT - Next Appointment 09/25/24  -      PT Goal Re-Cert Due Date 10/01/24  -         Time Calculation- PT    Total Timed Code Minutes- PT 8 minute(s)  -         Timed Charges    38536 - PT Therapeutic Activity Minutes 8  -CH         Total Minutes    Timed Charges Total Minutes 8  -CH       Total Minutes 8  -                User Key  (r) = Recorded By, (t) = Taken By, (c) = Cosigned By      Initials Name Provider Type     Rosalva Koch, PT Physical Therapist                  Therapy Charges for Today       Code Description Service Date Service Provider Modifiers Qty    25873419209  PT THERAPEUTIC ACT EA 15 MIN 9/24/2024 Rosalva Koch, PT GP 1    [id number]  PT EVAL LOW COMPLEXITY 2 9/24/2024 Rosalva Koch, PT GP 1            PT G-Codes  Outcome Measure Options: AM-PAC 6 Clicks Basic Mobility (PT)  AM-PAC 6 Clicks Score (PT): 18  PT Discharge Summary  Anticipated Discharge Disposition (PT): home with assist, home with outpatient therapy services    Rosalva Koch, PT  9/24/2024

## 2024-09-24 NOTE — CASE MANAGEMENT/SOCIAL WORK
Discharge Planning Assessment  [facility]     Patient Name: Rob Shah  MRN: [id number]  Today's Date: 9/24/2024    Admit Date: 9/23/2024        Discharge Needs Assessment    No documentation.                  Discharge Plan       Row Name 09/24/24 3231       Plan    Plan Comments Attempted to evaluate patient- was d/c home prior to arrival- attempted to contact patient via phone without answer      Row Name 09/24/24 1528       Plan    Final Discharge Disposition Code 01 - home or self-care                  Continued Care and Services - Discharged on 9/24/2024 Admission date: 9/23/2024 - Discharge disposition: Home or Self Care   No active coordination exists for this encounter.       Expected Discharge Date and Time       Expected Discharge Date Expected Discharge Time    Sep 24, 2024            Demographic Summary    No documentation.                  Functional Status    No documentation.                  Psychosocial    No documentation.                  Abuse/Neglect    No documentation.                  Legal    No documentation.                  Substance Abuse    No documentation.                  Patient Forms    No documentation.                     Nvaya Ackerman RN

## 2024-09-24 NOTE — PROGRESS NOTES
The WIN and I have discussed this patient's history, physical exam and treatment plan.  I provided a substantive portion of the care of this patient.  I have reviewed the documentation and personally had a face to face interaction with the patient and personally performed the physical exam, in its entirety.  I affirm the documentation and agree with the treatment and plan.  The following describes my personal findings.  SHARED VISIT: This visit was performed by BOTH a physician and an APC. The substantive portion of the medical decision making was performed by this attesting physician who made or approved the management plan and takes responsibility for patient management. All studies in the APC note (if performed) were independently interpreted by me.         The patient is admitted to the observation unit for further evaluation of acute low back pain for the past week without radiation to the legs, bowel or bladder incontinence, fevers.  Patient denies recent injuries, reports he has had episodes of back pain in the past that required epidural injection with no history of surgeries.    9/24/2024  Patient reports his back pain is slightly improved post epidural, again denies weakness, numbness, incontinence of lower extremities.  Voices understanding and agrees with plan for outpatient discharge with follow-up with physical therapy, PCP and neurosurgery as needed.     Comprehensive Physical exam:  Patient is nontoxic appearing oriented, conversant awake, alert  HEENT: normocephalic, atraumatic  Neck: no goiter, no pain with ROM  Pulmonary: Nontachypneic  cardiovascular: Nontachycardic  Abdomen: Soft, nontender  musculoskeletal: Distal pulses, sensation, patellar and Achilles reflex, plantar and dorsiflexion of the foot, knee extension and hip flexion intact bilaterally, negative straight leg raise  Neuro/psychiatric:calm, appropriate, cooperative  Skin:warm, dry     Plan:      Back pain  Multimodal analgesia with  Toradol, Robaxin and Percocet  Seen by neurosurgery with recommendation for DARIANA, with outpatient follow-up, no indication for operative management  CT lumbar spine does not show any acute fracture  MRI lumbar spine multilevel degenerative disease without focal herniation similar to imaging 4/29/2021 with the exception of endplate degenerative changes and small annular tear posterior laterally on the right at L4-5

## 2024-09-25 ENCOUNTER — TRANSITIONAL CARE MANAGEMENT TELEPHONE ENCOUNTER (OUTPATIENT)
Dept: CALL CENTER | Facility: HOSPITAL | Age: 74
End: 2024-09-25
Payer: MEDICARE

## 2024-10-12 DIAGNOSIS — E78.2 MIXED HYPERLIPIDEMIA: Chronic | ICD-10-CM

## 2024-10-12 DIAGNOSIS — E03.9 HYPOTHYROIDISM, ACQUIRED: Chronic | ICD-10-CM

## 2024-10-13 RX ORDER — ATORVASTATIN CALCIUM 40 MG/1
40 TABLET, FILM COATED ORAL DAILY
Qty: 90 TABLET | Refills: 3 | OUTPATIENT
Start: 2024-10-13

## 2024-10-13 RX ORDER — LEVOTHYROXINE SODIUM 100 UG/1
100 TABLET ORAL DAILY
Qty: 90 TABLET | Refills: 3 | OUTPATIENT
Start: 2024-10-13

## 2024-10-14 NOTE — PROGRESS NOTES
Subjective   Rob Shah is a 73 y.o. male.     CC: Hospital F/U for Back Pain    History of Present Illness     Pt returns today after recent hospitalization. That visit was as follows:    Date of Admission: 9/23/2024   LOS: 0 days   PCP: Rob Travis MD     Final Diagnosis lower back pain           Subjective  Hospital Outcome:      Rob Shah is a pleasant afebrile ambulatory 73 y.o.  male who was admitted to the ED observation unit for further testing and evaluation of low back pain worsening over the past 9 days, described as sharp/stabbing in nature.  Patient stated he used to get lumbar epidural steroid injections which helped.      MRI of lumbar spine reported multilevel degenerative disease involving the lumbar spine with mild disc desiccation and loss of disc height at multiple levels. Mild foraminal stenosis is present bilaterally. There is no evidence of a focal herniation. Endplate degenerative  changes posterolaterally to the right at L4-L5 and a small annular tear posterolaterally to the right at L4-L5.      Neurosurgery to see patient in consultation this morning.      ROS:  General: no fevers, chills  Respiratory: no cough, dyspnea  Cardiovascular: no chest pain, palpitations  Abdomen: No abdominal pain, nausea, vomiting, or diarrhea  Neurologic: No focal weakness     9/24/2024.     2:33 PM.  Patient has had LESI with improvement.  States he would like to go home at this time.  He has been cleared to do so by neurosurgery.  No red flags or concerns on exam and MRI.  Neurosurgery does recommend Rx for Medrol Dosepak, Robaxin, and outpatient physical therapy follow-up.  Will prescribe these medications and place the referral at this time.  Patient knows to return to the ED with worsening symptoms or if he has any further concerns.    Current outpatient and discharge medications have been reconciled for the patient.  Reviewed by: Rob Travis MD    Patient also doing well with his  "chronic medications, reports no side effects, and is due refills today.    Pt reports the back pain is quite a bit better at this time.     The following portions of the patient's history were reviewed and updated as appropriate: allergies, current medications, past family history, past medical history, past social history, past surgical history, and problem list.    Review of Systems   Constitutional:  Negative for activity change, chills and fever.   Respiratory:  Negative for cough.    Cardiovascular:  Negative for chest pain.   Psychiatric/Behavioral:  Negative for dysphoric mood.        /58   Pulse 75   Temp 97.6 °F (36.4 °C) (Oral)   Resp 16   Ht 175.3 cm (69\")   Wt 91.2 kg (201 lb)   SpO2 98%   BMI 29.68 kg/m²     Objective   Physical Exam  Vitals and nursing note reviewed.   Constitutional:       General: He is not in acute distress.     Appearance: He is well-developed.   Cardiovascular:      Rate and Rhythm: Normal rate and regular rhythm.   Pulmonary:      Effort: Pulmonary effort is normal.      Breath sounds: Normal breath sounds.   Neurological:      Mental Status: He is alert and oriented to person, place, and time.   Psychiatric:         Behavior: Behavior normal.         Thought Content: Thought content normal.     Hospital records reviewed with pt confirming HPI.      Assessment & Plan   Diagnoses and all orders for this visit:    1. Lumbar back pain (Primary)    2. Hospital discharge follow-up    3. Mixed hyperlipidemia  -     atorvastatin (LIPITOR) 40 MG tablet; Take 1 tablet by mouth Daily.  Dispense: 90 tablet; Refill: 1  -     Comprehensive Metabolic Panel  -     Lipid Panel    4. Degenerative disc disease, cervical  -     HYDROcodone-acetaminophen (Norco) 7.5-325 MG per tablet; Take 1 tablet by mouth Daily As Needed for Moderate Pain.  Dispense: 40 tablet; Refill: 0    5. Hypothyroidism, acquired  -     levothyroxine (Synthroid) 100 MCG tablet; Take 1 tablet by mouth Daily.  " Dispense: 90 tablet; Refill: 1  -     TSH  -     T4, Free    6. Nausea  -     ondansetron ODT (ZOFRAN-ODT) 4 MG disintegrating tablet; Place 1 tablet on the tongue Every 8 (Eight) Hours As Needed for Nausea or Vomiting.  Dispense: 30 tablet; Refill: 5    7. Immunization due  -     Fluzone High-Dose 65+yrs ([id number])    Daily stretching and exercise discussed to strengthen core.

## 2024-10-15 ENCOUNTER — OFFICE VISIT (OUTPATIENT)
Dept: FAMILY MEDICINE CLINIC | Facility: CLINIC | Age: 74
End: 2024-10-15
Payer: MEDICARE

## 2024-10-15 VITALS
OXYGEN SATURATION: 98 % | BODY MASS INDEX: 29.77 KG/M2 | RESPIRATION RATE: 16 BRPM | HEART RATE: 75 BPM | SYSTOLIC BLOOD PRESSURE: 102 MMHG | TEMPERATURE: 97.6 F | WEIGHT: 201 LBS | DIASTOLIC BLOOD PRESSURE: 58 MMHG | HEIGHT: 69 IN

## 2024-10-15 DIAGNOSIS — M50.30 DEGENERATIVE DISC DISEASE, CERVICAL: Chronic | ICD-10-CM

## 2024-10-15 DIAGNOSIS — Z23 IMMUNIZATION DUE: ICD-10-CM

## 2024-10-15 DIAGNOSIS — Z09 HOSPITAL DISCHARGE FOLLOW-UP: ICD-10-CM

## 2024-10-15 DIAGNOSIS — E03.9 HYPOTHYROIDISM, ACQUIRED: Chronic | ICD-10-CM

## 2024-10-15 DIAGNOSIS — R11.0 NAUSEA: Chronic | ICD-10-CM

## 2024-10-15 DIAGNOSIS — M54.50 LUMBAR BACK PAIN: Primary | ICD-10-CM

## 2024-10-15 DIAGNOSIS — E78.2 MIXED HYPERLIPIDEMIA: Chronic | ICD-10-CM

## 2024-10-15 PROCEDURE — 99214 OFFICE O/P EST MOD 30 MIN: CPT | Performed by: FAMILY MEDICINE

## 2024-10-15 PROCEDURE — 90662 IIV NO PRSV INCREASED AG IM: CPT | Performed by: FAMILY MEDICINE

## 2024-10-15 PROCEDURE — 1160F RVW MEDS BY RX/DR IN RCRD: CPT | Performed by: FAMILY MEDICINE

## 2024-10-15 PROCEDURE — 1111F DSCHRG MED/CURRENT MED MERGE: CPT | Performed by: FAMILY MEDICINE

## 2024-10-15 PROCEDURE — 1159F MED LIST DOCD IN RCRD: CPT | Performed by: FAMILY MEDICINE

## 2024-10-15 PROCEDURE — G0008 ADMIN INFLUENZA VIRUS VAC: HCPCS | Performed by: FAMILY MEDICINE

## 2024-10-15 PROCEDURE — 1125F AMNT PAIN NOTED PAIN PRSNT: CPT | Performed by: FAMILY MEDICINE

## 2024-10-15 RX ORDER — ONDANSETRON 4 MG/1
4 TABLET, ORALLY DISINTEGRATING ORAL EVERY 8 HOURS PRN
Qty: 30 TABLET | Refills: 5 | Status: SHIPPED | OUTPATIENT
Start: 2024-10-15

## 2024-10-15 RX ORDER — ATORVASTATIN CALCIUM 40 MG/1
40 TABLET, FILM COATED ORAL DAILY
Qty: 90 TABLET | Refills: 1 | Status: SHIPPED | OUTPATIENT
Start: 2024-10-15

## 2024-10-15 RX ORDER — LEVOTHYROXINE SODIUM 100 UG/1
100 TABLET ORAL DAILY
Qty: 90 TABLET | Refills: 1 | Status: SHIPPED | OUTPATIENT
Start: 2024-10-15

## 2024-10-15 RX ORDER — HYDROCODONE BITARTRATE AND ACETAMINOPHEN 7.5; 325 MG/1; MG/1
1 TABLET ORAL DAILY PRN
Qty: 40 TABLET | Refills: 0 | Status: SHIPPED | OUTPATIENT
Start: 2024-10-15

## 2024-10-15 NOTE — PROGRESS NOTES
Injection  HD FLU Injection performed in LEFT DELTOID  by Milady Martinez MA. Patient tolerated the procedure well without complications.  10/15/24   Milady Martinez MA

## 2024-10-17 LAB
ALBUMIN SERPL-MCNC: 4.1 G/DL (ref 3.8–4.8)
ALP SERPL-CCNC: 88 IU/L (ref 44–121)
ALT SERPL-CCNC: 30 IU/L (ref 0–44)
AST SERPL-CCNC: 25 IU/L (ref 0–40)
BILIRUB SERPL-MCNC: 0.6 MG/DL (ref 0–1.2)
BUN SERPL-MCNC: 12 MG/DL (ref 8–27)
BUN/CREAT SERPL: 11 (ref 10–24)
CALCIUM SERPL-MCNC: 9.4 MG/DL (ref 8.6–10.2)
CHLORIDE SERPL-SCNC: 103 MMOL/L (ref 96–106)
CHOLEST SERPL-MCNC: 150 MG/DL (ref 100–199)
CO2 SERPL-SCNC: 25 MMOL/L (ref 20–29)
CREAT SERPL-MCNC: 1.14 MG/DL (ref 0.76–1.27)
EGFRCR SERPLBLD CKD-EPI 2021: 67 ML/MIN/1.73
GLOBULIN SER CALC-MCNC: 2.5 G/DL (ref 1.5–4.5)
GLUCOSE SERPL-MCNC: 94 MG/DL (ref 70–99)
HDLC SERPL-MCNC: 65 MG/DL
LDLC SERPL CALC-MCNC: 70 MG/DL (ref 0–99)
POTASSIUM SERPL-SCNC: 4 MMOL/L (ref 3.5–5.2)
PROT SERPL-MCNC: 6.6 G/DL (ref 6–8.5)
SODIUM SERPL-SCNC: 141 MMOL/L (ref 134–144)
T4 FREE SERPL-MCNC: 1.56 NG/DL (ref 0.82–1.77)
TRIGL SERPL-MCNC: 80 MG/DL (ref 0–149)
TSH SERPL DL<=0.005 MIU/L-ACNC: 2.87 UIU/ML (ref 0.45–4.5)
VLDLC SERPL CALC-MCNC: 15 MG/DL (ref 5–40)

## 2024-12-09 ENCOUNTER — TRANSCRIBE ORDERS (OUTPATIENT)
Dept: ADMINISTRATIVE | Facility: HOSPITAL | Age: 74
End: 2024-12-09
Payer: MEDICARE

## 2024-12-09 DIAGNOSIS — R97.20 ELEVATED PSA: Primary | ICD-10-CM

## 2024-12-09 DIAGNOSIS — N13.8 BENIGN PROSTATIC HYPERPLASIA WITH URINARY OBSTRUCTION: ICD-10-CM

## 2024-12-09 DIAGNOSIS — N40.1 BENIGN PROSTATIC HYPERPLASIA WITH URINARY OBSTRUCTION: ICD-10-CM

## 2024-12-16 DIAGNOSIS — M50.30 DEGENERATIVE DISC DISEASE, CERVICAL: Chronic | ICD-10-CM

## 2024-12-16 RX ORDER — HYDROCODONE BITARTRATE AND ACETAMINOPHEN 7.5; 325 MG/1; MG/1
1 TABLET ORAL DAILY PRN
Qty: 40 TABLET | Refills: 0 | Status: SHIPPED | OUTPATIENT
Start: 2024-12-16

## 2024-12-17 ENCOUNTER — TELEPHONE (OUTPATIENT)
Dept: FAMILY MEDICINE CLINIC | Facility: CLINIC | Age: 74
End: 2024-12-17
Payer: MEDICARE

## 2024-12-17 NOTE — TELEPHONE ENCOUNTER
Outpatient Medication Detail    HYDROcodone-acetaminophen (Norco) 7.5-325 MG per tablet        Sig: Take 1 tablet by mouth Daily As Needed for Moderate Pain.        Sent to pharmacy as: HYDROcodone-Acetaminophen 7.5-325 MG Oral Tablet        Class: Normal        Earliest Fill Date: 12/16/2024        Route: Oral        E-Prescribing Status: Receipt confirmed by pharmacy (12/16/2024  4:39 PM EST)

## 2024-12-17 NOTE — TELEPHONE ENCOUNTER
"  Caller: Rob Shah \"Jerry\"    Relationship: Self    Best call back number: [phone]     What was the call regarding: HYDROcodone-acetaminophen (Norco) 7.5-325 MG per tablet    PATIENT STATED THAT THE PHARMACY THINKS THEY ARE SUPPOSED TO DISPENSE 325 TABLETS. PATIENT STATED THAT IT SHOULD BE 20 TABLETS. PATIENT IS ASKING FOR THIS TO BE RESOLVED WITH THE PHARMACY. PLEASE ADVISE.    PHARMACY:  [id number] [address] [facility] - [phone] [facility] [phone] FX   "

## 2024-12-17 NOTE — TELEPHONE ENCOUNTER
"      Hub staff attempted to follow warm transfer process and was unsuccessful     Caller: Rob Shah \"Jerry\"    Relationship to patient: Self    Best call back number: [phone]     Patient is needing: PATIENT IS REQUESTING A CALL REGARDING THE CORRECT AMOUNT OF MEDICATION TABLETS. PLEASE CALL PATIENT TO DISCUSS.    "

## 2024-12-17 NOTE — TELEPHONE ENCOUNTER
I CALLED AND SPOKE WITH PATIENT. PT TOLD UNABLE TO VERIFY WITH HIS PHARM.PT IS GOING TO GO UP TO PHARM.

## 2024-12-17 NOTE — TELEPHONE ENCOUNTER
Personally called [facility] x[facility]. Spent a total of 20 min waiting on hold. At this time no contact with pharm.

## 2024-12-19 ENCOUNTER — HOSPITAL ENCOUNTER (OUTPATIENT)
Facility: HOSPITAL | Age: 74
Discharge: HOME | End: 2024-12-19
Admitting: STUDENT IN AN ORGANIZED HEALTH CARE EDUCATION/TRAINING PROGRAM
Payer: MEDICARE

## 2024-12-19 DIAGNOSIS — N13.8 BENIGN PROSTATIC HYPERPLASIA WITH URINARY OBSTRUCTION: ICD-10-CM

## 2024-12-19 DIAGNOSIS — R97.20 ELEVATED PSA: ICD-10-CM

## 2024-12-19 DIAGNOSIS — N40.1 BENIGN PROSTATIC HYPERPLASIA WITH URINARY OBSTRUCTION: ICD-10-CM

## 2024-12-19 PROCEDURE — 25510000002 GADOBENATE DIMEGLUMINE 529 MG/ML SOLUTION: Performed by: STUDENT IN AN ORGANIZED HEALTH CARE EDUCATION/TRAINING PROGRAM

## 2024-12-19 PROCEDURE — A9577 INJ MULTIHANCE: HCPCS | Performed by: STUDENT IN AN ORGANIZED HEALTH CARE EDUCATION/TRAINING PROGRAM

## 2024-12-19 PROCEDURE — 72197 MRI PELVIS W/O & W/DYE: CPT

## 2024-12-19 RX ORDER — DIAZEPAM 5 MG/1
5 TABLET ORAL ONCE
COMMUNITY
Start: 2024-12-10

## 2024-12-19 RX ADMIN — GADOBENATE DIMEGLUMINE 20 ML: 529 INJECTION, SOLUTION INTRAVENOUS at 09:31

## 2025-01-02 ENCOUNTER — ANESTHESIA EVENT (OUTPATIENT)
Dept: PAIN MEDICINE | Facility: HOSPITAL | Age: 75
End: 2025-01-02
Payer: MEDICARE

## 2025-01-02 ENCOUNTER — HOSPITAL ENCOUNTER (OUTPATIENT)
Dept: GENERAL RADIOLOGY | Facility: HOSPITAL | Age: 75
Discharge: HOME | End: 2025-01-02
Payer: MEDICARE

## 2025-01-02 ENCOUNTER — ANESTHESIA (OUTPATIENT)
Dept: PAIN MEDICINE | Facility: HOSPITAL | Age: 75
End: 2025-01-02
Payer: MEDICARE

## 2025-01-02 ENCOUNTER — HOSPITAL ENCOUNTER (OUTPATIENT)
Dept: PAIN MEDICINE | Facility: HOSPITAL | Age: 75
Discharge: HOME | End: 2025-01-02
Payer: MEDICARE

## 2025-01-02 VITALS
OXYGEN SATURATION: 95 % | HEART RATE: 65 BPM | DIASTOLIC BLOOD PRESSURE: 88 MMHG | TEMPERATURE: 97.3 F | RESPIRATION RATE: 16 BRPM | SYSTOLIC BLOOD PRESSURE: 145 MMHG

## 2025-01-02 DIAGNOSIS — M51.360 DEGENERATION OF INTERVERTEBRAL DISC OF LUMBAR REGION WITH DISCOGENIC BACK PAIN: Primary | ICD-10-CM

## 2025-01-02 DIAGNOSIS — R52 PAIN: ICD-10-CM

## 2025-01-02 PROCEDURE — 25010000002 METHYLPREDNISOLONE PER 80 MG: Performed by: ANESTHESIOLOGY

## 2025-01-02 PROCEDURE — 77003 FLUOROGUIDE FOR SPINE INJECT: CPT

## 2025-01-02 PROCEDURE — 25510000001 IOPAMIDOL 41 % SOLUTION: Performed by: ANESTHESIOLOGY

## 2025-01-02 RX ORDER — MIDAZOLAM HYDROCHLORIDE 1 MG/ML
1 INJECTION, SOLUTION INTRAMUSCULAR; INTRAVENOUS ONCE AS NEEDED
Status: DISCONTINUED | OUTPATIENT
Start: 2025-01-02 | End: 2025-01-03 | Stop reason: HOSPADM

## 2025-01-02 RX ORDER — METHYLPREDNISOLONE ACETATE 80 MG/ML
80 INJECTION, SUSPENSION INTRA-ARTICULAR; INTRALESIONAL; INTRAMUSCULAR; SOFT TISSUE ONCE
Status: COMPLETED | OUTPATIENT
Start: 2025-01-02 | End: 2025-01-02

## 2025-01-02 RX ORDER — FENTANYL CITRATE 50 UG/ML
50 INJECTION, SOLUTION INTRAMUSCULAR; INTRAVENOUS ONCE
Status: DISCONTINUED | OUTPATIENT
Start: 2025-01-02 | End: 2025-01-03 | Stop reason: HOSPADM

## 2025-01-02 RX ORDER — LIDOCAINE HYDROCHLORIDE 10 MG/ML
1 INJECTION, SOLUTION INFILTRATION; PERINEURAL ONCE
Status: DISCONTINUED | OUTPATIENT
Start: 2025-01-02 | End: 2025-01-03 | Stop reason: HOSPADM

## 2025-01-02 RX ORDER — IOPAMIDOL 408 MG/ML
10 INJECTION, SOLUTION INTRATHECAL
Status: COMPLETED | OUTPATIENT
Start: 2025-01-02 | End: 2025-01-02

## 2025-01-02 RX ADMIN — IOPAMIDOL 10 ML: 408 INJECTION, SOLUTION INTRATHECAL at 08:36

## 2025-01-02 RX ADMIN — METHYLPREDNISOLONE ACETATE 80 MG: 80 INJECTION, SUSPENSION INTRA-ARTICULAR; INTRALESIONAL; INTRAMUSCULAR; SOFT TISSUE at 08:36

## 2025-01-02 NOTE — H&P
[facility]    History and Physical    Patient Name: Rob Shah  :  1950  MRN:  [id number]  Date of Admission: 2025    Subjective     Patient is a 74 y.o. male presents with chief complaint of chronic, moderate low back pain, midline.  Denies radiationg into lower extremities.  Onset of symptoms was abrupt starting several months ago @ which time he presented to the ED.  Symptoms are associated/aggravated by activity. Symptoms improve with injection - more than 50% relief w/ prior lesi.  Presents for lesi today.      MRI lumbar spine IMPRESSION:  Multilevel degenerative disease involving the lumbar spine  as noted as described above overall similar to 2021 and including  mild disc desiccation and loss of disc height at multiple levels. Mild  foraminal stenosis is present bilaterally as described in detail above.  There is no evidence of a focal herniation. Endplate degenerative  changes posterolaterally to the right at L4-L5 and a small annular tear  posterolaterally to the right at L4-L5 are appreciated which are new  versus 2021. (2024 8:00 PM by Dr. Geovany Mtz M.D)      The following portions of the patients history were reviewed and updated as appropriate: current medications, allergies, past medical history, past surgical history, past family history, past social history, and problem list                Objective     Past Medical History:   Past Medical History:   Diagnosis Date    Allergic     Allergic rhinitis     CHRONIC, D/T POLLEN    Bilateral tinnitus     BPH (benign prostatic hyperplasia)     FOLLOWED BY DR. ROSE LANZA    CKD (chronic kidney disease)     STAGE II, FOLLOWED BY DR. MAX NOEL    Colon polyps     FOLLOWED BY DR. MARCUS BEGUM    DDD (degenerative disc disease), cervical     DDD (degenerative disc disease), lumbosacral     CHRONIC LUMBAR PAIN    Erectile dysfunction 2009    Headache     Hyperlipidemia     MIXED    Hypothyroidism,  acquired     Liver hemangioma 01/18/2016    FOLLOWED BY DR. MAX NOEL    Low back pain 1999    Nodular prostate without urinary obstruction     FOLLOWED BY DR. ROSE LANZA    Osteoarthritis     Sleep apnea     DOES NOT WEAR CPAP     Past Surgical History:   Past Surgical History:   Procedure Laterality Date    CARDIAC CATHETERIZATION  2003    D/T CP AND ABNML LABS    COLONOSCOPY N/A 10/18/2011    ENTIRE COLON WNL, RESCOPE IN 5 YRS, DR.RAYMOND BELLO AT [facility]    COLONOSCOPY N/A 11/09/2016    3 TUBULAR ADENOMA POLYPS IN CECUM, 2 TUBULAR ADENOMA POLYPS IN ASCENDING, 7 MM TUBULAR ADENOMA POLYP IN HEPATIC FLEXURE, RESCOPE IN 3 YRS, DR. MARCUS BEGUM AT [facility]    COLONOSCOPY N/A 11/14/2019    3 TUBULAR ADENOMA POLYPS IN TRANSVERSE, RESCOPE IN 3 YRS, DR. MARCUS BEGUM AT [facility]    COLONOSCOPY N/A 11/16/2022    4 MM BENIGN POLYP IN SIGMOID, MULTIPLE SMALL AND LARGE DIVERTICULA IN SIGMOID, RESCOPE IN 5 YRS, DR. MARCUS BEGUM AT [facility]    ENDOSCOPY      ENDOSCOPY AND COLONOSCOPY N/A 09/01/2005    NO RECORDS    EYE SURGERY  2020    HAND SURGERY Right 1961    HERNIA REPAIR N/A 1978    EPIGASTRIC HERNIA REPAIR    KNEE ARTHROSCOPY Left 12/02/2020    Procedure: LEFT KNEE ARTHROSCOPY. PARTIAL MEDIAL MENISECTOMY;  Surgeon: Juan Smith MD;  Location: [facility] OR [facility];  Service: Orthopedics;  Laterality: Left;    LUMBAR EPIDURAL INJECTION N/A 08/11/2016    DR. THOMAS CASEY AT [facility]    LUMBAR EPIDURAL INJECTION N/A 06/07/2011    DR. SULLY HERNANDEZ AT [facility]    LUMBAR EPIDURAL INJECTION N/A 01/11/2011    DR. BRENT DUNN AT [facility]    LUMBAR EPIDURAL INJECTION N/A 10/07/2010    DR. JAY CHAMORRO AT [facility]    LUMBAR EPIDURAL INJECTION N/A 04/05/2010    DR. FREDDY MEJIA AT [facility]    LUMBAR EPIDURAL INJECTION N/A 03/24/2010    DR. FREDDY MEJIA AT [facility]    LUMBAR EPIDURAL INJECTION N/A 08/26/2009    DR. JAY CHAMORRO AT [facility]    LUMBAR EPIDURAL INJECTION N/A 08/19/2009    DR. BRIAN MELVIN AT [facility]    LUMBAR EPIDURAL INJECTION N/A 06/28/2007    DR. JAY CHAMORRO AT [facility]     LUMBAR EPIDURAL INJECTION N/A 2007    DR. AGUSTIN GONZALES AT [facility]    LUMBAR EPIDURAL INJECTION N/A 2007    DR. KENDRA CHANG AT [facility]    LUMBAR EPIDURAL INJECTION N/A 2006    DR. LÓPEZ DACOSTA AT [facility]    LUMBAR EPIDURAL INJECTION N/A 2006    DR. KENDRA CHANG AT [facility]    PROSTATE BIOPSY      X2    VASECTOMY N/A      Family History:   Family History   Problem Relation Age of Onset    Heart failure Mother     Kidney disease Mother     Heart disease Mother     Arthritis Mother     Cancer Father         BRAIN    Brain cancer Father     Alcohol abuse Father     Colon cancer Paternal Uncle     Cancer Paternal Uncle         Uncle    Malig Hyperthermia Neg Hx      Social History:   Social History     Socioeconomic History    Marital status:     Number of children: 2    Years of education: 12TH GRADE   Tobacco Use    Smoking status: Former     Current packs/day: 0.00     Average packs/day: 1 pack/day for 30.0 years (30.0 ttl pk-yrs)     Types: Cigarettes     Start date:      Quit date:      Years since quittin.0    Smokeless tobacco: Never   Vaping Use    Vaping status: Never Used   Substance and Sexual Activity    Alcohol use: Not Currently     Comment: RARELY    Drug use: No    Sexual activity: Yes     Partners: Female       Vital Signs Range for the last 24 hours  Temperature:     Temp Source:     BP: BP: ()/()    Pulse:     Respirations:     SPO2:     O2 Amount (l/min):     O2 Devices     Weight:           --------------------------------------------------------------------------------    Current Outpatient Medications   Medication Sig Dispense Refill    alfuzosin (UROXATRAL) 10 MG 24 hr tablet Take 1 tablet by mouth every night at bedtime.      ascorbic acid (VITAMIN C) 500 MG tablet Take 1 tablet by mouth Daily With Dinner.      atorvastatin (LIPITOR) 40 MG tablet Take 1 tablet by mouth Daily. 90 tablet 1    Cholecalciferol (Vitamin D3) 50 MCG ( UT) tablet Take 1  tablet by mouth Daily With Dinner.      diazePAM (VALIUM) 5 MG tablet Take 1 tablet by mouth 1 time.      diclofenac (VOLTAREN) 75 MG EC tablet Take 1 tablet by mouth 2 (Two) Times a Day. 30 tablet 2    fluticasone (FLONASE) 50 MCG/ACT nasal spray 2 sprays into the nostril(s) as directed by provider Daily. 48 g 3    HYDROcodone-acetaminophen (Norco) 7.5-325 MG per tablet Take 1 tablet by mouth Daily As Needed for Moderate Pain. 40 tablet 0    levothyroxine (Synthroid) 100 MCG tablet Take 1 tablet by mouth Daily. 90 tablet 1    ondansetron ODT (ZOFRAN-ODT) 4 MG disintegrating tablet Place 1 tablet on the tongue Every 8 (Eight) Hours As Needed for Nausea or Vomiting. 30 tablet 5    valACYclovir (VALTREX) 1000 MG tablet TAKE 2 TABLETS EVERY TWELVE HOURS FOR 2 DOSES AS NEEDED (Patient taking differently: As Needed. TAKE 2 TABLETS EVERY TWELVE HOURS FOR 2 DOSES AS NEEDED) 40 tablet 3    Zinc 50 MG capsule Take 1 tablet by mouth Daily With Dinner.       Current Facility-Administered Medications   Medication Dose Route Frequency Provider Last Rate Last Admin    fentaNYL citrate (PF) (SUBLIMAZE) injection 50 mcg  50 mcg Intravenous Once Doreen Diaz MD        iopamidol (ISOVUE-M 200) injection 41%  10 mL Epidural Once in imaging Doreen Diaz MD        lidocaine (XYLOCAINE) 1 % injection 1 mL  1 mL Intradermal Once Doreen Diaz MD        methylPREDNISolone acetate (DEPO-medrol) injection 80 mg  80 mg Epidural Once Doreen Diaz MD        midazolam (VERSED) injection 1 mg  1 mg Intravenous Once PRN Doreen Diaz MD           --------------------------------------------------------------------------------  Assessment & Plan      Anesthesia Evaluation     Patient summary reviewed and Nursing notes reviewed   no history of anesthetic complications:                Airway   Mallampati: II  Dental      Pulmonary    (+) a smoker Former,sleep apnea   Cardiovascular     (+) hyperlipidemia      Neuro/Psych  (+) headaches  GI/Hepatic/Renal/Endo    (+) liver disease, renal disease-, thyroid problem hypothyroidism    Musculoskeletal     (+) back pain, chronic pain, neck pain  Abdominal    Substance History - negative use     OB/GYN negative ob/gyn ROS         Other   arthritis,                  Diagnosis and Plan    Treatment Plan  ASA 2   Patient has had previous injection/procedure with 50-75% improvement.   Procedures: Lumbar Epidural Steroid Injection(LESI), With fluoroscopy,      Anesthetic plan and risks discussed with patient.          Diagnosis     * Intervertebral disc stenosis of neural canal of lumbar region [M99.53]

## 2025-01-02 NOTE — ANESTHESIA PROCEDURE NOTES
PAIN Epidural block    Pre-sedation assessment completed: 1/2/2025 8:29 AM    Patient reassessed immediately prior to procedure    Patient location during procedure: pain clinic  Start Time: 1/2/2025 8:29 AM  Stop Time: 1/2/2025 8:41 AM  Indication:procedure for pain  Performed By  Anesthesiologist: Doreen Diaz MD  Preanesthetic Checklist  Completed: patient identified, IV checked, site marked, risks and benefits discussed, surgical consent, monitors and equipment checked, pre-op evaluation and timeout performed  Additional Notes  Fluoro used.    Prep:  Pt Position:prone  Sterile Tech:cap, gloves, mask and sterile barrier  Prep:chlorhexidine gluconate and isopropyl alcohol  Monitoring:blood pressure monitoring, continuous pulse oximetry and EKG  Procedure:Sedation: no     Approach:midline  Guidance: fluoroscopy  Location:lumbar  Level:4-5  Needle Type:Tuohy  Needle Gauge:20  Aspiration:negative  Medications:  Preservative Free Saline:3mL  Isovue:1mL  Comments:Dye spread c/w epidurogram  Dx: intervertebral disc stenosis lumbar regionDepomedrol:80  Post Assessment:  Dressing:occlusive dressing applied  Pt Tolerance:patient tolerated the procedure well with no apparent complications  Complications:no

## 2025-01-02 NOTE — DISCHARGE INSTRUCTIONS
EPIDURAL STEROID INJECTION          An epidural steroid injection is a shot of steroid medicine and numbing medicine that is given into the space between the spinal cord and the bones of the back (epidural space).  The injection helps relieve pain by an irritated or swollen nerve root.    TELL YOUR HEALTH CARE PROVIDER ABOUT:  Any allergies you have  All medicines you are taking including any over the counter medicines  Any blood disorders you have  Any surgeries you have had  Any medical conditions you have  Whether you are pregnant or may be pregnant    WHAT ARE THE RISK?  Generally, this is a safe procedure. However,problems may occur, including  Headache  Bleeding  Infection  Allergic Reaction  Nerve Damage    WHAT CAN I EXPECT AFTER THE PROCEDURE?    INJECTION SITE  Remove the Band-Aid/s after 24 hours  Check your injection site every day for signs of infection.  Check for:             Redness             Bleeding (small amt is normal)             Warmth             Pus or bad odor  Some numbness may be experienced for several hours following the procedure.  Avoid using heat on the injection site for 24 hours. You may use ice intermittently if needed by placing a         towel between your skin and the ice bag and using the ice for 20 minutes 2-3 times a day.  Do not take baths, swim or use a hot tub for 24 hours.    ACTIVITY  No strenuous activity for 24 hours then return to normal activity as tolerated.  If your leg is numb, no driving until full sensation and strength has returned.    GENERAL INSTRUCTIONS:  The injection site may feel numb, use ice with caution if numbness is present and no heat for 24 hours or until numbness is gone.   If you have numbness or weakness in your arm or leg, use those areas with caution until normal sensation returns.  It is not uncommon to notice an increase in discomfort or a change in the location of discomfort for 3-4 days after the procedure.  If discomfort is noticed  at the injection site, ice may be            applied to that area for 20 min 2-3 times a day.  Take the pain medicine your physician has prescribed or over the counter pain relievers as long as you do not have any contraindications.  If you are a diabetic, monitor your blood sugar closely.  The steroids used in your procedure may increase your blood sugar level up to 36 hours after the injection.  If your blood sugar is greater than 250, call the physician that helps you monitor your blood sugar.  Keep all follow-up visits as scheduled by your health care provider. This is important.    CONTACT OUR OFFICE IF:  You have any of these signs of infection            -Redness, swelling, or warmth around your injection site.            -Fluid or blood coming from your injection site (small amt of blood is normal)            -Pus or a bad odor from your injection site            -A fever  You develop a severe headache or a stiff neck  You lose control of your bladder or bowel movements      PAIN MANAGEMENT CENTER HOURS   Monday-Friday 7:30 am. - 4:00 pm.  For any problem related to your procedure we can be reached at [phone].  If you experience an emergency with your procedure, call [phone] or go to the emergency room.      What is [employer]?  [employer] is a fitness and wellness program offered at no additional cost to seniors 65+ on eligible Medicare plans that helps you get active, get fit, and connect with others.  The program is designed for all levels and abilities and provides access to online and in-person classes, over 15,000 fitness locations, and health & wellness discounts.  Before starting any exercise program, consult with your primary care provider.  For additional information and to check eligibility:  [email or web address]                      EPIDURAL STEROID INJECTION          An epidural steroid injection is a shot of steroid medicine and numbing medicine that is given into the  space between the spinal cord and the bones of the back (epidural space).  The injection helps relieve pain by an irritated or swollen nerve root.    TELL YOUR HEALTH CARE PROVIDER ABOUT:  Any allergies you have  All medicines you are taking including any over the counter medicines  Any blood disorders you have  Any surgeries you have had  Any medical conditions you have  Whether you are pregnant or may be pregnant    WHAT ARE THE RISK?  Generally, this is a safe procedure. However,problems may occur, including  Headache  Bleeding  Infection  Allergic Reaction  Nerve Damage    WHAT CAN I EXPECT AFTER THE PROCEDURE?    INJECTION SITE  Remove the Band-Aid/s after 24 hours  Check your injection site every day for signs of infection.  Check for:             Redness             Bleeding (small amt is normal)             Warmth             Pus or bad odor  Some numbness may be experienced for several hours following the procedure.  Avoid using heat on the injection site for 24 hours. You may use ice intermittently if needed by placing a         towel between your skin and the ice bag and using the ice for 20 minutes 2-3 times a day.  Do not take baths, swim or use a hot tub for 24 hours.    ACTIVITY  No strenuous activity for 24 hours then return to normal activity as tolerated.  If your leg is numb, no driving until full sensation and strength has returned.    GENERAL INSTRUCTIONS:  The injection site may feel numb, use ice with caution if numbness is present and no heat for 24 hours or until numbness is gone.   If you have numbness or weakness in your arm or leg, use those areas with caution until normal sensation returns.  It is not uncommon to notice an increase in discomfort or a change in the location of discomfort for 3-4 days after the procedure.  If discomfort is noticed at the injection site, ice may be            applied to that area for 20 min 2-3 times a day.  Take the pain medicine your physician has  prescribed or over the counter pain relievers as long as you do not have any contraindications.  If you are a diabetic, monitor your blood sugar closely.  The steroids used in your procedure may increase your blood sugar level up to 36 hours after the injection.  If your blood sugar is greater than 250, call the physician that helps you monitor your blood sugar.  Keep all follow-up visits as scheduled by your health care provider. This is important.    CONTACT OUR OFFICE IF:  You have any of these signs of infection            -Redness, swelling, or warmth around your injection site.            -Fluid or blood coming from your injection site (small amt of blood is normal)            -Pus or a bad odor from your injection site            -A fever  You develop a severe headache or a stiff neck  You lose control of your bladder or bowel movements      PAIN MANAGEMENT CENTER HOURS   Monday-Friday 7:30 am. - 4:00 pm.  For any problem related to your procedure we can be reached at [phone].  If you experience an emergency with your procedure, call [phone] or go to the emergency room.      What is [employer]?  [employer] is a fitness and wellness program offered at no additional cost to seniors 65+ on eligible Medicare plans that helps you get active, get fit, and connect with others.  The program is designed for all levels and abilities and provides access to online and in-person classes, over 15,000 fitness locations, and health & wellness discounts.  Before starting any exercise program, consult with your primary care provider.  For additional information and to check eligibility:  tools.[employer]

## 2025-04-04 ENCOUNTER — ANESTHESIA (OUTPATIENT)
Dept: PAIN MEDICINE | Facility: HOSPITAL | Age: 75
End: 2025-04-04
Payer: MEDICARE

## 2025-04-04 ENCOUNTER — HOSPITAL ENCOUNTER (OUTPATIENT)
Dept: GENERAL RADIOLOGY | Facility: HOSPITAL | Age: 75
Discharge: HOME | End: 2025-04-04
Payer: MEDICARE

## 2025-04-04 ENCOUNTER — ANESTHESIA EVENT (OUTPATIENT)
Dept: PAIN MEDICINE | Facility: HOSPITAL | Age: 75
End: 2025-04-04
Payer: MEDICARE

## 2025-04-04 ENCOUNTER — HOSPITAL ENCOUNTER (OUTPATIENT)
Dept: PAIN MEDICINE | Facility: HOSPITAL | Age: 75
Discharge: HOME | End: 2025-04-04
Payer: MEDICARE

## 2025-04-04 VITALS
SYSTOLIC BLOOD PRESSURE: 143 MMHG | HEART RATE: 57 BPM | OXYGEN SATURATION: 95 % | TEMPERATURE: 98.2 F | DIASTOLIC BLOOD PRESSURE: 84 MMHG | RESPIRATION RATE: 16 BRPM

## 2025-04-04 DIAGNOSIS — M51.369 ANNULAR TEAR OF LUMBAR DISC: Primary | ICD-10-CM

## 2025-04-04 DIAGNOSIS — R52 PAIN: ICD-10-CM

## 2025-04-04 DIAGNOSIS — M51.360 DEGENERATION OF INTERVERTEBRAL DISC OF LUMBAR REGION WITH DISCOGENIC BACK PAIN: ICD-10-CM

## 2025-04-04 PROCEDURE — 77003 FLUOROGUIDE FOR SPINE INJECT: CPT

## 2025-04-04 PROCEDURE — 25010000002 METHYLPREDNISOLONE PER 80 MG: Performed by: ANESTHESIOLOGY

## 2025-04-04 PROCEDURE — 25510000001 IOPAMIDOL 41 % SOLUTION: Performed by: ANESTHESIOLOGY

## 2025-04-04 RX ORDER — LIDOCAINE HYDROCHLORIDE 10 MG/ML
1 INJECTION, SOLUTION INFILTRATION; PERINEURAL ONCE
Status: DISCONTINUED | OUTPATIENT
Start: 2025-04-04 | End: 2025-04-05 | Stop reason: HOSPADM

## 2025-04-04 RX ORDER — MIDAZOLAM HYDROCHLORIDE 1 MG/ML
1 INJECTION, SOLUTION INTRAMUSCULAR; INTRAVENOUS ONCE AS NEEDED
Status: DISCONTINUED | OUTPATIENT
Start: 2025-04-04 | End: 2025-04-05 | Stop reason: HOSPADM

## 2025-04-04 RX ORDER — METHYLPREDNISOLONE ACETATE 80 MG/ML
80 INJECTION, SUSPENSION INTRA-ARTICULAR; INTRALESIONAL; INTRAMUSCULAR; SOFT TISSUE ONCE
Status: COMPLETED | OUTPATIENT
Start: 2025-04-04 | End: 2025-04-04

## 2025-04-04 RX ORDER — IOPAMIDOL 408 MG/ML
10 INJECTION, SOLUTION INTRATHECAL
Status: COMPLETED | OUTPATIENT
Start: 2025-04-04 | End: 2025-04-04

## 2025-04-04 RX ORDER — FENTANYL CITRATE 50 UG/ML
50 INJECTION, SOLUTION INTRAMUSCULAR; INTRAVENOUS ONCE
Status: DISCONTINUED | OUTPATIENT
Start: 2025-04-04 | End: 2025-04-05 | Stop reason: HOSPADM

## 2025-04-04 RX ADMIN — METHYLPREDNISOLONE ACETATE 80 MG: 80 INJECTION, SUSPENSION INTRA-ARTICULAR; INTRALESIONAL; INTRAMUSCULAR; SOFT TISSUE at 10:45

## 2025-04-04 RX ADMIN — IOPAMIDOL 10 ML: 408 INJECTION, SOLUTION INTRATHECAL at 10:45

## 2025-04-04 NOTE — DISCHARGE INSTRUCTIONS
EPIDURAL STEROID INJECTION          An epidural steroid injection is a shot of steroid medicine and numbing medicine that is given into the space between the spinal cord and the bones of the back (epidural space).  The injection helps relieve pain by an irritated or swollen nerve root.    TELL YOUR HEALTH CARE PROVIDER ABOUT:  Any allergies you have  All medicines you are taking including any over the counter medicines  Any blood disorders you have  Any surgeries you have had  Any medical conditions you have  Whether you are pregnant or may be pregnant    WHAT ARE THE RISK?  Generally, this is a safe procedure. However,problems may occur, including  Headache  Bleeding  Infection  Allergic Reaction  Nerve Damage    WHAT CAN I EXPECT AFTER THE PROCEDURE?    INJECTION SITE  Remove the Band-Aid/s after 24 hours  Check your injection site every day for signs of infection.  Check for:             Redness             Bleeding (small amt is normal)             Warmth             Pus or bad odor  Some numbness may be experienced for several hours following the procedure.  Avoid using heat on the injection site for 24 hours. You may use ice intermittently if needed by placing a         towel between your skin and the ice bag and using the ice for 20 minutes 2-3 times a day.  Do not take baths, swim or use a hot tub for 24 hours.    ACTIVITY  No strenuous activity for 24 hours then return to normal activity as tolerated.  If your leg is numb, no driving until full sensation and strength has returned.    GENERAL INSTRUCTIONS:  The injection site may feel numb, use ice with caution if numbness is present and no heat for 24 hours or until numbness is gone.   If you have numbness or weakness in your arm or leg, use those areas with caution until normal sensation returns.  It is not uncommon to notice an increase in discomfort or a change in the location of discomfort for 3-4 days after the procedure.  If discomfort is noticed  at the injection site, ice may be            applied to that area for 20 min 2-3 times a day.  Take the pain medicine your physician has prescribed or over the counter pain relievers as long as you do not have any contraindications.  If you are a diabetic, monitor your blood sugar closely.  The steroids used in your procedure may increase your blood sugar level up to 36 hours after the injection.  If your blood sugar is greater than 250, call the physician that helps you monitor your blood sugar.  Keep all follow-up visits as scheduled by your health care provider. This is important.    CONTACT OUR OFFICE IF:  You have any of these signs of infection            -Redness, swelling, or warmth around your injection site.            -Fluid or blood coming from your injection site (small amt of blood is normal)            -Pus or a bad odor from your injection site            -A fever  You develop a severe headache or a stiff neck  You lose control of your bladder or bowel movements      PAIN MANAGEMENT CENTER HOURS   Monday-Friday 7:30 am. - 4:00 pm.  For any problem related to your procedure we can be reached at [phone].  If you experience an emergency with your procedure, call [phone] or go to the emergency room.    Back Exercises  These exercises help to make your trunk and back strong. They also help to keep the lower back flexible. Doing these exercises can help to prevent or lessen pain in your lower back.  If you have back pain, try to do these exercises 2-3 times each day or as told by your doctor.  As you get better, do the exercises once each day. Repeat the exercises more often as told by your doctor.  To stop back pain from coming back, do the exercises once each day, or as told by your doctor.  Do exercises exactly as told by your doctor. Stop right away if you feel sudden pain or your pain gets worse.  Exercises  Single knee to chest  Do these steps 3-5 times in a row for each leg:  Lie on your  back on a firm bed or the floor with your legs stretched out.  Bring one knee to your chest.  Grab your knee or thigh with both hands and hold it in place.  Pull on your knee until you feel a gentle stretch in your lower back or butt.  Keep doing the stretch for 10-30 seconds.  Slowly let go of your leg and straighten it.  Pelvic tilt  Do these steps 5-10 times in a row:  Lie on your back on a firm bed or the floor with your legs stretched out.  Bend your knees so they point up to the ceiling. Your feet should be flat on the floor.  Tighten your lower belly (abdomen) muscles to press your lower back against the floor. This will make your tailbone point up to the ceiling instead of pointing down to your feet or the floor.  Stay in this position for 5-10 seconds while you gently tighten your muscles and breathe evenly.  Cat-cow  Do these steps until your lower back bends more easily:  Get on your hands and knees on a firm bed or the floor. Keep your hands under your shoulders, and keep your knees under your hips. You may put padding under your knees.  Let your head hang down toward your chest. Tighten (contract) the muscles in your belly. Point your tailbone toward the floor so your lower back becomes rounded like the back of a cat.  Stay in this position for 5 seconds.  Slowly lift your head. Let the muscles of your belly relax. Point your tailbone up toward the ceiling so your back forms a sagging arch like the back of a cow.  Stay in this position for 5 seconds.     Press-ups  Do these steps 5-10 times in a row:  Lie on your belly (face-down) on a firm bed or the floor.  Place your hands near your head, about shoulder-width apart.  While you keep your back relaxed and keep your hips on the floor, slowly straighten your arms to raise the top half of your body and lift your shoulders. Do not use your back muscles. You may change where you place your hands to make yourself more comfortable.  Stay in this position for  5 seconds. Keep your back relaxed.  Slowly return to lying flat on the floor.     Bridges  Do these steps 10 times in a row:  Lie on your back on a firm bed or the floor.  Bend your knees so they point up to the ceiling. Your feet should be flat on the floor. Your arms should be flat at your sides, next to your body.  Tighten your butt muscles and lift your butt off the floor until your waist is almost as high as your knees. If you do not feel the muscles working in your butt and the back of your thighs, slide your feet 1-2 inches (2.5-5 cm) farther away from your butt.  Stay in this position for 3-5 seconds.  Slowly lower your butt to the floor, and let your butt muscles relax.  If this exercise is too easy, try doing it with your arms crossed over your chest.  Belly crunches  Do these steps 5-10 times in a row:  Lie on your back on a firm bed or the floor with your legs stretched out.  Bend your knees so they point up to the ceiling. Your feet should be flat on the floor.  Cross your arms over your chest.  Tip your chin a little bit toward your chest, but do not bend your neck.  Tighten your belly muscles and slowly raise your chest just enough to lift your shoulder blades a tiny bit off the floor. Avoid raising your body higher than that because it can put too much stress on your lower back.  Slowly lower your chest and your head to the floor.  Back lifts  Do these steps 5-10 times in a row:  Lie on your belly (face-down) with your arms at your sides, and rest your forehead on the floor.  Tighten the muscles in your legs and your butt.  Slowly lift your chest off the floor while you keep your hips on the floor. Keep the back of your head in line with the curve in your back. Look at the floor while you do this.  Stay in this position for 3-5 seconds.  Slowly lower your chest and your face to the floor.  Contact a doctor if:  Your back pain gets a lot worse when you do an exercise.  Your back pain does not get  better within 2 hours after you exercise.  If you have any of these problems, stop doing the exercises. Do not do them again unless your doctor says it is okay.  Get help right away if:  You have sudden, very bad back pain. If this happens, stop doing the exercises. Do not do them again unless your doctor says it is okay.  This information is not intended to replace advice given to you by your health care provider. Make sure you discuss any questions you have with your health care provider.  Document Revised: 03/02/2022 Document Reviewed: 03/02/2022  [employer] Patient Education © 2024 [employer]    What is [employer]?  [employer] is a fitness and wellness program offered at no additional cost to seniors 65+ on eligible Medicare plans that helps you get active, get fit, and connect with others.  The program is designed for all levels and abilities and provides access to online and in-person classes, over 15,000 fitness locations, and health & wellness discounts.  Before starting any exercise program, consult with your primary care provider.  For additional information and to check eligibility:  [email or web address]

## 2025-04-04 NOTE — ANESTHESIA PROCEDURE NOTES
PAIN Epidural block    Pre-sedation assessment completed: 4/4/2025 10:43 AM    Patient reassessed immediately prior to procedure    Start Time: 4/4/2025 10:44 AM  Stop Time: 4/4/2025 10:50 AM  Indication:at surgeon's request and procedure for pain  Performed By  Anesthesiologist: Harjinder Xiao MD  Preanesthetic Checklist  Completed: patient identified, risks and benefits discussed, surgical consent, monitors and equipment checked, pre-op evaluation and timeout performed  Additional Notes  Post-Op Diagnosis Codes:     * Intervertebral disc disorder with radiculopathy of lumbar region [M51.16]    Prep:  Pt Position:prone  Sterile Tech:sterile barrier, mask and gloves  Prep:chlorhexidine gluconate and isopropyl alcohol  Monitoring:blood pressure monitoring, continuous pulse oximetry and EKG  Procedure:  Approach:left paramedian  Guidance: fluoroscopy  Location:lumbar  Level:4-5  Needle Gauge:20 G  Aspiration:negative  Test Dose:negative  Medications:  Isovue:2mL  Depomedrol:80mg  Post Assessment:  Pt Tolerance:patient tolerated the procedure well with no apparent complications  Complications:no

## 2025-04-14 NOTE — ASSESSMENT & PLAN NOTE
Lipid abnormalities are stable    Plan:  Continue same medication/s without change.      Counseled patient on lifestyle modifications to help control hyperlipidemia.     Patient Treatment Goals:   LDL goal is under 100    Followup in 6 months.    Orders:    atorvastatin (LIPITOR) 40 MG tablet; Take 1 tablet by mouth Daily.

## 2025-04-14 NOTE — ASSESSMENT & PLAN NOTE
Orders:    HYDROcodone-acetaminophen (Norco) 7.5-325 MG per tablet; Take 1 tablet by mouth Daily As Needed for Moderate Pain.

## 2025-04-14 NOTE — PROGRESS NOTES
Subjective   The ABCs of the Annual Wellness Visit  Medicare Wellness Visit      Rob Shah is a 74 y.o. patient who presents for a Medicare Wellness Visit.    The following portions of the patient's history were reviewed and   updated as appropriate: allergies, current medications, past family history, past medical history, past social history, past surgical history, and problem list.    Compared to one year ago, the patient's physical   health is the same.  Compared to one year ago, the patient's mental   health is the same.    Recent Hospitalizations:  This patient has had a [facility] admission record on file within the last 365 days.  Current Medical Providers:  Patient Care Team:  Rob Travis MD as PCP - General  Rob Travis MD as PCP - Family Medicine  Demetrius Ramon MD as Consulting Physician (Urology)  Everardo Gracia MD as Consulting Physician (Ophthalmology)  Doreen Diaz MD as Consulting Physician (Anesthesiology)    Outpatient Medications Prior to Visit   Medication Sig Dispense Refill    alfuzosin (UROXATRAL) 10 MG 24 hr tablet Take 1 tablet by mouth every night at bedtime.      ascorbic acid (VITAMIN C) 500 MG tablet Take 1 tablet by mouth Daily With Dinner.      Cholecalciferol (Vitamin D3) 50 MCG (2000 UT) tablet Take 1 tablet by mouth Daily With Dinner.      diclofenac (VOLTAREN) 75 MG EC tablet Take 1 tablet by mouth 2 (Two) Times a Day. 30 tablet 2    ondansetron ODT (ZOFRAN-ODT) 4 MG disintegrating tablet Place 1 tablet on the tongue Every 8 (Eight) Hours As Needed for Nausea or Vomiting. 30 tablet 5    valACYclovir (VALTREX) 1000 MG tablet TAKE 2 TABLETS EVERY TWELVE HOURS FOR 2 DOSES AS NEEDED (Patient taking differently: As Needed. TAKE 2 TABLETS EVERY TWELVE HOURS FOR 2 DOSES AS NEEDED) 40 tablet 3    Zinc 50 MG capsule Take 1 tablet by mouth Daily With Dinner.      atorvastatin (LIPITOR) 40 MG tablet Take 1 tablet by mouth Daily. 90 tablet 1     "fluticasone (FLONASE) 50 MCG/ACT nasal spray 2 sprays into the nostril(s) as directed by provider Daily. 48 g 3    HYDROcodone-acetaminophen (Norco) 7.5-325 MG per tablet Take 1 tablet by mouth Daily As Needed for Moderate Pain. 40 tablet 0    levothyroxine (Synthroid) 100 MCG tablet Take 1 tablet by mouth Daily. 90 tablet 1     No facility-administered medications prior to visit.     Opioid medication/s are on active medication list.  and I have evaluated his active treatment plan and pain score trends (see table).  Vitals:    04/15/25 0956   PainSc: 0-No pain     I have reviewed the chart for potential of high risk medication and harmful drug interactions in the elderly.        Aspirin is not on active medication list.  Aspirin use is not indicated based on review of current medical condition/s. Risk of harm outweighs potential benefits.  .    Patient Active Problem List   Diagnosis    Lumbar degenerative disc disease    ED (erectile dysfunction)    Hypothyroidism, acquired    Insomnia    Nodular prostate without urinary obstruction    Osteoarthritis    Hyperlipidemia    Testicular hypofunction    Bilateral tinnitus    Allergic rhinitis due to pollen    Nausea    Neck pain    Pain of both shoulder joints    Degenerative disc disease, cervical    History of colon polyps    Herpes simplex    Artificial lens present    Keratoconjunctivitis sicca not specified as Sjogren's    Low back pain    Annular tear of lumbar disc     Advance Care Planning Advance Directive is on file.  ACP discussion was held with the patient during this visit. Patient has an advance directive in EMR which is still valid.             Objective   Vitals:    04/15/25 0956   BP: 126/74   Pulse: 67   Resp: 16   Temp: 97.9 °F (36.6 °C)   TempSrc: Oral   SpO2: 97%   Weight: 91.9 kg (202 lb 9.6 oz)   Height: 175.3 cm (69\")   PainSc: 0-No pain       Estimated body mass index is 29.92 kg/m² as calculated from the following:    Height as of this " "encounter: 175.3 cm (69\").    Weight as of this encounter: 91.9 kg (202 lb 9.6 oz).                Does the patient have evidence of cognitive impairment? No                                                                                                Health  Risk Assessment    Smoking Status:  Social History     Tobacco Use   Smoking Status Former    Current packs/day: 0.00    Average packs/day: 1 pack/day for 30.0 years (30.0 ttl pk-yrs)    Types: Cigarettes    Start date: 1967    Quit date:     Years since quittin.3   Smokeless Tobacco Never     Alcohol Consumption:  Social History     Substance and Sexual Activity   Alcohol Use Not Currently    Comment: RARELY       Fall Risk Screen  STEFANADI Fall Risk Assessment has not been completed.    Depression Screening   Little interest or pleasure in doing things? Not at all   Feeling down, depressed, or hopeless? Not at all   PHQ-2 Total Score 0      Health Habits and Functional and Cognitive Screenin/15/2025     9:00 AM   Functional & Cognitive Status   Do you have difficulty preparing food and eating? No   Do you have difficulty bathing yourself, getting dressed or grooming yourself? No   Do you have difficulty using the toilet? No   Do you have difficulty moving around from place to place? No   Do you have trouble with steps or getting out of a bed or a chair? No   Current Diet Well Balanced Diet   Dental Exam Up to date   Eye Exam Up to date   Exercise (times per week) 4 times per week   Current Exercises Include Walking   Do you need help using the phone?  No   Are you deaf or do you have serious difficulty hearing?  No   Do you need help to go to places out of walking distance? No   Do you need help shopping? No   Do you need help preparing meals?  No   Do you need help with housework?  No   Do you need help with laundry? No   Do you need help taking your medications? No   Do you need help managing money? No   Do you ever drive or ride in a " car without wearing a seat belt? No   Have you felt unusual stress, anger or loneliness in the last month? No   Who do you live with? Spouse   If you need help, do you have trouble finding someone available to you? No   Have you been bothered in the last four weeks by sexual problems? No   Do you have difficulty concentrating, remembering or making decisions? No           Age-appropriate Screening Schedule:  Refer to the list below for future screening recommendations based on patient's age, sex and/or medical conditions. Orders for these recommended tests are listed in the plan section. The patient has been provided with a written plan.    Health Maintenance List  Health Maintenance   Topic Date Due    RSV Vaccine - Adults (1 - 1-dose 75+ series) Never done    COVID-19 Vaccine (7 - 2024-25 season) 09/01/2024    INFLUENZA VACCINE  07/01/2025    LIPID PANEL  10/16/2025    ANNUAL WELLNESS VISIT  04/15/2026    TDAP/TD VACCINES (2 - Tdap) 05/15/2031    COLORECTAL CANCER SCREENING  11/16/2032    Pneumococcal Vaccine 50+  Completed    ZOSTER VACCINE  Completed    HEPATITIS C SCREENING  Discontinued                                                                                                                                                CMS Preventative Services Quick Reference  Risk Factors Identified During Encounter  None Identified    The above risks/problems have been discussed with the patient.  Pertinent information has been shared with the patient in the After Visit Summary.  An After Visit Summary and PPPS were made available to the patient.    Follow Up:   Next Medicare Wellness visit to be scheduled in 1 year.         Additional E&M Note during same encounter follows:  Patient has additional, significant, and separately identifiable condition(s)/problem(s) that require work above and beyond the Medicare Wellness Visit     Chief Complaint  Hypothyroidism, Hyperlipidemia, Arthritis, Allergies (Med refill due /  "csa updated today  ), and Medicare Wellness-subsequent    Subjective   HPI  Jerry is also being seen today for additional medical problem/s.    Review of Systems   Constitutional:  Negative for chills and fever.   HENT:  Negative for congestion, ear pain and sinus pressure.    Eyes:  Negative for pain and visual disturbance.   Respiratory:  Negative for cough and shortness of breath.    Cardiovascular:  Negative for chest pain.   Gastrointestinal:  Negative for abdominal pain.   Genitourinary:  Negative for difficulty urinating and dysuria.   Skin: Negative.    Neurological: Negative.    Psychiatric/Behavioral:  Negative for dysphoric mood.               Objective   Vital Signs:  /74   Pulse 67   Temp 97.9 °F (36.6 °C) (Oral)   Resp 16   Ht 175.3 cm (69\")   Wt 91.9 kg (202 lb 9.6 oz)   SpO2 97%   BMI 29.92 kg/m²   Physical Exam  Vitals and nursing note reviewed.   Constitutional:       General: He is not in acute distress.     Appearance: He is well-developed.   Cardiovascular:      Rate and Rhythm: Normal rate and regular rhythm.   Pulmonary:      Effort: Pulmonary effort is normal.      Breath sounds: Normal breath sounds.   Neurological:      Mental Status: He is alert and oriented to person, place, and time.   Psychiatric:         Behavior: Behavior normal.         Thought Content: Thought content normal.                    Assessment and Plan Additional age appropriate preventative wellness advice topics were discussed during today's preventative wellness exam(some topics already addressed during AWV portion of the note above):    Physical Activity: Advised cardiovascular activity 150 minutes per week as tolerated. (example brisk walk for 30 minutes, 5 days a week).     Nutrition: Discussed nutrition plan with patient. Information shared in after visit summary. Goal is for a well balanced diet to enhance overall health.     Encounter for subsequent annual wellness visit (AWV) in Medicare patient     "     Mixed hyperlipidemia   Lipid abnormalities are stable    Plan:  Continue same medication/s without change.      Counseled patient on lifestyle modifications to help control hyperlipidemia.     Patient Treatment Goals:   LDL goal is under 100    Followup in 6 months.    Orders:    atorvastatin (LIPITOR) 40 MG tablet; Take 1 tablet by mouth Daily.    Chronic nonseasonal allergic rhinitis due to pollen    Orders:    fluticasone (FLONASE) 50 MCG/ACT nasal spray; Administer 2 sprays into the nostril(s) as directed by provider Daily.    Degenerative disc disease, cervical    Orders:    HYDROcodone-acetaminophen (Norco) 7.5-325 MG per tablet; Take 1 tablet by mouth Daily As Needed for Moderate Pain.    Hypothyroidism, acquired    Orders:    levothyroxine (Synthroid) 100 MCG tablet; Take 1 tablet by mouth Daily.          I spent 15 minutes caring for Rob on this date of service. This time includes time spent by me in the following activities:preparing for the visit, performing a medically appropriate examination and/or evaluation , ordering medications, tests, or procedures, and documenting information in the medical record  Follow Up   No follow-ups on file.  Patient was given instructions and counseling regarding his condition or for health maintenance advice. Please see specific information pulled into the AVS if appropriate.

## 2025-04-15 ENCOUNTER — OFFICE VISIT (OUTPATIENT)
Dept: FAMILY MEDICINE CLINIC | Facility: CLINIC | Age: 75
End: 2025-04-15
Payer: MEDICARE

## 2025-04-15 VITALS
RESPIRATION RATE: 16 BRPM | SYSTOLIC BLOOD PRESSURE: 126 MMHG | HEIGHT: 69 IN | WEIGHT: 202.6 LBS | TEMPERATURE: 97.9 F | OXYGEN SATURATION: 97 % | HEART RATE: 67 BPM | DIASTOLIC BLOOD PRESSURE: 74 MMHG | BODY MASS INDEX: 30.01 KG/M2

## 2025-04-15 DIAGNOSIS — J30.89 CHRONIC NONSEASONAL ALLERGIC RHINITIS DUE TO POLLEN: Chronic | ICD-10-CM

## 2025-04-15 DIAGNOSIS — E78.2 MIXED HYPERLIPIDEMIA: Chronic | ICD-10-CM

## 2025-04-15 DIAGNOSIS — M50.30 DEGENERATIVE DISC DISEASE, CERVICAL: Chronic | ICD-10-CM

## 2025-04-15 DIAGNOSIS — E03.9 HYPOTHYROIDISM, ACQUIRED: Chronic | ICD-10-CM

## 2025-04-15 DIAGNOSIS — Z00.00 ENCOUNTER FOR SUBSEQUENT ANNUAL WELLNESS VISIT (AWV) IN MEDICARE PATIENT: Primary | ICD-10-CM

## 2025-04-15 RX ORDER — LEVOTHYROXINE SODIUM 100 UG/1
100 TABLET ORAL DAILY
Qty: 90 TABLET | Refills: 1 | Status: SHIPPED | OUTPATIENT
Start: 2025-04-15

## 2025-04-15 RX ORDER — ATORVASTATIN CALCIUM 40 MG/1
40 TABLET, FILM COATED ORAL DAILY
Qty: 90 TABLET | Refills: 1 | Status: SHIPPED | OUTPATIENT
Start: 2025-04-15

## 2025-04-15 RX ORDER — FLUTICASONE PROPIONATE 50 MCG
2 SPRAY, SUSPENSION (ML) NASAL DAILY
Qty: 48 G | Refills: 3 | Status: SHIPPED | OUTPATIENT
Start: 2025-04-15

## 2025-04-15 RX ORDER — HYDROCODONE BITARTRATE AND ACETAMINOPHEN 7.5; 325 MG/1; MG/1
1 TABLET ORAL DAILY PRN
Qty: 40 TABLET | Refills: 0 | Status: SHIPPED | OUTPATIENT
Start: 2025-04-15

## 2025-04-15 NOTE — PATIENT INSTRUCTIONS
Medicare Wellness  Personal Prevention Plan of Service     Date of Office Visit:    Encounter Provider:  Rob Travis MD  Place of Service:  [facility]  Patient Name: Rob Shah  :  1950    As part of the Medicare Wellness portion of your visit today, we are providing you with this personalized preventive plan of services (PPPS). This plan is based upon recommendations of the [place] Preventive Services Task Force (USPSTF) and the Advisory Committee on Immunization Practices (ACIP).    This lists the preventive care services that should be considered, and provides dates of when you are due. Items listed as completed are up-to-date and do not require any further intervention.    Health Maintenance   Topic Date Due    RSV Vaccine - Adults (1 - 1-dose 75+ series) Never done    COVID-19 Vaccine (2024- season) 2024    INFLUENZA VACCINE  2025    LIPID PANEL  10/16/2025    ANNUAL WELLNESS VISIT  04/15/2026    TDAP/TD VACCINES (2 - Tdap) 05/15/2031    COLORECTAL CANCER SCREENING  2032    Pneumococcal Vaccine 50+  Completed    ZOSTER VACCINE  Completed    HEPATITIS C SCREENING  Discontinued       No orders of the defined types were placed in this encounter.      No follow-ups on file.           , joon all pertinent systems normal

## 2025-07-07 ENCOUNTER — ANESTHESIA (OUTPATIENT)
Dept: PAIN MEDICINE | Facility: HOSPITAL | Age: 75
End: 2025-07-07
Payer: MEDICARE

## 2025-07-07 ENCOUNTER — HOSPITAL ENCOUNTER (OUTPATIENT)
Dept: GENERAL RADIOLOGY | Facility: HOSPITAL | Age: 75
Discharge: HOME | End: 2025-07-07
Payer: MEDICARE

## 2025-07-07 ENCOUNTER — HOSPITAL ENCOUNTER (OUTPATIENT)
Dept: PAIN MEDICINE | Facility: HOSPITAL | Age: 75
Discharge: HOME | End: 2025-07-07
Payer: MEDICARE

## 2025-07-07 ENCOUNTER — ANESTHESIA EVENT (OUTPATIENT)
Dept: PAIN MEDICINE | Facility: HOSPITAL | Age: 75
End: 2025-07-07
Payer: MEDICARE

## 2025-07-07 VITALS
SYSTOLIC BLOOD PRESSURE: 155 MMHG | HEART RATE: 64 BPM | OXYGEN SATURATION: 96 % | DIASTOLIC BLOOD PRESSURE: 88 MMHG | TEMPERATURE: 97.3 F | RESPIRATION RATE: 16 BRPM

## 2025-07-07 DIAGNOSIS — R52 PAIN: ICD-10-CM

## 2025-07-07 DIAGNOSIS — M51.369 ANNULAR TEAR OF LUMBAR DISC: ICD-10-CM

## 2025-07-07 DIAGNOSIS — M51.360 DEGENERATION OF INTERVERTEBRAL DISC OF LUMBAR REGION WITH DISCOGENIC BACK PAIN: ICD-10-CM

## 2025-07-07 PROCEDURE — 25510000001 IOPAMIDOL 41 % SOLUTION: Performed by: ANESTHESIOLOGY

## 2025-07-07 PROCEDURE — 25010000002 METHYLPREDNISOLONE PER 80 MG: Performed by: ANESTHESIOLOGY

## 2025-07-07 PROCEDURE — 77003 FLUOROGUIDE FOR SPINE INJECT: CPT

## 2025-07-07 RX ORDER — MIDAZOLAM HYDROCHLORIDE 1 MG/ML
1 INJECTION, SOLUTION INTRAMUSCULAR; INTRAVENOUS ONCE AS NEEDED
Status: DISCONTINUED | OUTPATIENT
Start: 2025-07-07 | End: 2025-07-08 | Stop reason: HOSPADM

## 2025-07-07 RX ORDER — FENTANYL CITRATE 50 UG/ML
50 INJECTION, SOLUTION INTRAMUSCULAR; INTRAVENOUS ONCE
Status: DISCONTINUED | OUTPATIENT
Start: 2025-07-07 | End: 2025-07-08 | Stop reason: HOSPADM

## 2025-07-07 RX ORDER — METHYLPREDNISOLONE ACETATE 80 MG/ML
80 INJECTION, SUSPENSION INTRA-ARTICULAR; INTRALESIONAL; INTRAMUSCULAR; SOFT TISSUE ONCE
Status: COMPLETED | OUTPATIENT
Start: 2025-07-07 | End: 2025-07-07

## 2025-07-07 RX ORDER — LIDOCAINE HYDROCHLORIDE 10 MG/ML
1 INJECTION, SOLUTION INFILTRATION; PERINEURAL ONCE
Status: DISCONTINUED | OUTPATIENT
Start: 2025-07-07 | End: 2025-07-08 | Stop reason: HOSPADM

## 2025-07-07 RX ORDER — IOPAMIDOL 408 MG/ML
10 INJECTION, SOLUTION INTRATHECAL
Status: COMPLETED | OUTPATIENT
Start: 2025-07-07 | End: 2025-07-07

## 2025-07-07 RX ADMIN — METHYLPREDNISOLONE ACETATE 80 MG: 80 INJECTION, SUSPENSION INTRA-ARTICULAR; INTRALESIONAL; INTRAMUSCULAR; SOFT TISSUE at 10:48

## 2025-07-07 RX ADMIN — IOPAMIDOL 10 ML: 408 INJECTION, SOLUTION INTRATHECAL at 10:49

## 2025-07-07 NOTE — ANESTHESIA PROCEDURE NOTES
PAIN Epidural block    Pre-sedation assessment completed: 7/7/2025 10:48 AM    Patient reassessed immediately prior to procedure    Start Time: 7/7/2025 10:49 AM  Stop Time: 7/7/2025 10:54 AM  Indication:at surgeon's request and procedure for pain  Performed By  Anesthesiologist: Harjinder Xiao MD  Preanesthetic Checklist  Completed: patient identified, risks and benefits discussed, surgical consent, monitors and equipment checked, pre-op evaluation and timeout performed  Additional Notes  Post-Op Diagnosis Codes:     * Connective tissue and disc stenosis of intervertebral foramina of lumbar region [M99.73]     * Lumbar radiculopathy [M54.16]    Prep:  Pt Position:prone  Sterile Tech:sterile barrier, mask and gloves  Prep:chlorhexidine gluconate and isopropyl alcohol  Monitoring:blood pressure monitoring, continuous pulse oximetry and EKG  Procedure:Sedation: no     Approach:right paramedian  Guidance: fluoroscopy  Location:lumbar  Level:4-5  Needle Gauge:20 G  Aspiration:negative  Test Dose:negative  Medications:  Isovue:2mL  Depomedrol:80mg  Post Assessment:  Pt Tolerance:patient tolerated the procedure well with no apparent complications  Complications:no

## 2025-07-07 NOTE — DISCHARGE INSTRUCTIONS
EPIDURAL STEROID INJECTION          An epidural steroid injection is a shot of steroid medicine and numbing medicine that is given into the space between the spinal cord and the bones of the back (epidural space).  The injection helps relieve pain by an irritated or swollen nerve root.    TELL YOUR HEALTH CARE PROVIDER ABOUT:  Any allergies you have  All medicines you are taking including any over the counter medicines  Any blood disorders you have  Any surgeries you have had  Any medical conditions you have  Whether you are pregnant or may be pregnant    WHAT ARE THE RISK?  Generally, this is a safe procedure. However,problems may occur, including  Headache  Bleeding  Infection  Allergic Reaction  Nerve Damage    WHAT CAN I EXPECT AFTER THE PROCEDURE?    INJECTION SITE  Remove the Band-Aid/s after 24 hours  Check your injection site every day for signs of infection.  Check for:             Redness             Bleeding (small amt is normal)             Warmth             Pus or bad odor  Some numbness may be experienced for several hours following the procedure.  Avoid using heat on the injection site for 24 hours. You may use ice intermittently if needed by placing a         towel between your skin and the ice bag and using the ice for 20 minutes 2-3 times a day.  Do not take baths, swim or use a hot tub for 24 hours.    ACTIVITY  No strenuous activity for 24 hours then return to normal activity as tolerated.  If your leg is numb, no driving until full sensation and strength has returned.    GENERAL INSTRUCTIONS:  The injection site may feel numb, use ice with caution if numbness is present and no heat for 24 hours or until numbness is gone.   If you have numbness or weakness in your arm or leg, use those areas with caution until normal sensation returns.  It is not uncommon to notice an increase in discomfort or a change in the location of discomfort for 3-4 days after the procedure.  If discomfort is noticed  at the injection site, ice may be            applied to that area for 20 min 2-3 times a day.  Take the pain medicine your physician has prescribed or over the counter pain relievers as long as you do not have any contraindications.  If you are a diabetic, monitor your blood sugar closely.  The steroids used in your procedure may increase your blood sugar level up to 36 hours after the injection.  If your blood sugar is greater than 250, call the physician that helps you monitor your blood sugar.  Keep all follow-up visits as scheduled by your health care provider. This is important.    CONTACT OUR OFFICE IF:  You have any of these signs of infection            -Redness, swelling, or warmth around your injection site.            -Fluid or blood coming from your injection site (small amt of blood is normal)            -Pus or a bad odor from your injection site            -A fever  You develop a severe headache or a stiff neck  You lose control of your bladder or bowel movements      [facility]NA[facility]EMENT [facility] HOURS   Monday-Friday 7:30 am. - 4:00 pm.  For any problem related to your procedure we can be reached at [phone].  If you experience an emergency with your procedure, call [phone] or go to the emergency room.    Back Exercises  These exercises help to make your trunk and back strong. They also help to keep the lower back flexible. Doing these exercises can help to prevent or lessen pain in your lower back.  If you have back pain, try to do these exercises 2-3 times each day or as told by your doctor.  As you get better, do the exercises once each day. Repeat the exercises more often as told by your doctor.  To stop back pain from coming back, do the exercises once each day, or as told by your doctor.  Do exercises exactly as told by your doctor. Stop right away if you feel sudden pain or your pain gets worse.  Exercises  Single knee to chest  Do these steps 3-5 times in a row for each leg:  Lie on your  back on a firm bed or the floor with your legs stretched out.  Bring one knee to your chest.  Grab your knee or thigh with both hands and hold it in place.  Pull on your knee until you feel a gentle stretch in your lower back or butt.  Keep doing the stretch for 10-30 seconds.  Slowly let go of your leg and straighten it.  Pelvic tilt  Do these steps 5-10 times in a row:  Lie on your back on a firm bed or the floor with your legs stretched out.  Bend your knees so they point up to the ceiling. Your feet should be flat on the floor.  Tighten your lower belly (abdomen) muscles to press your lower back against the floor. This will make your tailbone point up to the ceiling instead of pointing down to your feet or the floor.  Stay in this position for 5-10 seconds while you gently tighten your muscles and breathe evenly.  Cat-cow  Do these steps until your lower back bends more easily:  Get on your hands and knees on a firm bed or the floor. Keep your hands under your shoulders, and keep your knees under your hips. You may put padding under your knees.  Let your head hang down toward your chest. Tighten (contract) the muscles in your belly. Point your tailbone toward the floor so your lower back becomes rounded like the back of a cat.  Stay in this position for 5 seconds.  Slowly lift your head. Let the muscles of your belly relax. Point your tailbone up toward the ceiling so your back forms a sagging arch like the back of a cow.  Stay in this position for 5 seconds.     Press-ups  Do these steps 5-10 times in a row:  Lie on your belly (face-down) on a firm bed or the floor.  Place your hands near your head, about shoulder-width apart.  While you keep your back relaxed and keep your hips on the floor, slowly straighten your arms to raise the top half of your body and lift your shoulders. Do not use your back muscles. You may change where you place your hands to make yourself more comfortable.  Stay in this position for  5 seconds. Keep your back relaxed.  Slowly return to lying flat on the floor.     Bridges  Do these steps 10 times in a row:  Lie on your back on a firm bed or the floor.  Bend your knees so they point up to the ceiling. Your feet should be flat on the floor. Your arms should be flat at your sides, next to your body.  Tighten your butt muscles and lift your butt off the floor until your waist is almost as high as your knees. If you do not feel the muscles working in your butt and the back of your thighs, slide your feet 1-2 inches (2.5-5 cm) farther away from your butt.  Stay in this position for 3-5 seconds.  Slowly lower your butt to the floor, and let your butt muscles relax.  If this exercise is too easy, try doing it with your arms crossed over your chest.  Belly crunches  Do these steps 5-10 times in a row:  Lie on your back on a firm bed or the floor with your legs stretched out.  Bend your knees so they point up to the ceiling. Your feet should be flat on the floor.  Cross your arms over your chest.  Tip your chin a little bit toward your chest, but do not bend your neck.  Tighten your belly muscles and slowly raise your chest just enough to lift your shoulder blades a tiny bit off the floor. Avoid raising your body higher than that because it can put too much stress on your lower back.  Slowly lower your chest and your head to the floor.  Back lifts  Do these steps 5-10 times in a row:  Lie on your belly (face-down) with your arms at your sides, and rest your forehead on the floor.  Tighten the muscles in your legs and your butt.  Slowly lift your chest off the floor while you keep your hips on the floor. Keep the back of your head in line with the curve in your back. Look at the floor while you do this.  Stay in this position for 3-5 seconds.  Slowly lower your chest and your face to the floor.  Contact a doctor if:  Your back pain gets a lot worse when you do an exercise.  Your back pain does not get  better within 2 hours after you exercise.  If you have any of these problems, stop doing the exercises. Do not do them again unless your doctor says it is okay.  Get help right away if:  You have sudden, very bad back pain. If this happens, stop doing the exercises. Do not do them again unless your doctor says it is okay.  This information is not intended to replace advice given to you by your health care provider. Make sure you discuss any questions you have with your health care provider.  Document Revised: 03/02/2022 Document Reviewed: 03/02/2022  [employer] Patient Education © 2024 [employer]    What is [employer]?  [employer] is a fitness and wellness program offered at no additional cost to seniors 65+ on eligible Medicare plans that helps you get active, get fit, and connect with others.  The program is designed for all levels and abilities and provides access to online and in-person classes, over 15,000 fitness locations, and health & wellness discounts.  Before starting any exercise program, consult with your primary care provider.  For additional information and to check eligibility:  tools[employer]inted

## 2025-07-07 NOTE — H&P
[facility]    History and Physical    Patient Name: Rob Shah  :  1950  MRN:  [id number]  Date of Admission: 2025    Subjective     Patient is a 74 y.o. male presents with chief complaint of chronic low back and leg: right pain.  Onset of symptoms was gradual starting several months ago.  Symptoms are associated/aggravated by nothing in particular, activity, or standing. Symptoms improve with lying down and injection    The following portions of the patients history were reviewed and updated as appropriate: current medications, allergies, past medical history, past surgical history, past family history, past social history, and problem list  Done physical therapy with minimal to no relief of his pain.  He says Advil helps his pain modestly.  He had a previous epidural steroid injection got about 80% relief of his pain for over 2 months.  He is very happy with the pain relief he says it has started to come back in some capacity    MRI  Narrative & Impression   MRI LUMBAR SPINE WO CONTRAST-     HISTORY:  intractable low back pain; M54.50-Low back pain, unspecified     COMPARISON: MRI lumbar spine 2021     FINDINGS: The alignment of the lumbar spine is within normal limits.  There is mild disc desiccation and loss of disc height from L2-S1. A  hemangioma is appreciated involving the L1 vertebral body superiorly.  The conus is at L1 and the caudal aspect the spinal cord appears  unremarkable.     L1-L2: There is no evidence of a disc bulge or herniation.     L2-L3: A mild central broad-based disc osteophyte complex is present  resulting in mild flattening of the ventral surface of the thecal sac.  Mild foraminal stenosis is present bilaterally secondary to extension of  a small disc osteophyte complex into the neural foramen.     L3-L4: A minimal central disc bulge is present. Mild foraminal stenosis  is present bilaterally secondary to extension of disc material into the  neural foramen.      L4-L5: A mild central broad-based disc osteophyte complex is present  which results in mild flattening of the ventral surface of the thecal  sac. Mild mild to moderate foraminal stenosis on the left and right  respectively is appreciated secondary to extension of the disc  osteophyte complex into the neural foramen. Mild endplate degenerative  changes are present posterolaterally to the right which are new versus  the prior examination. An annular tear is present posterolaterally to  the right which is new.     L5-S1: There is no evidence of a disc bulge or herniation.     IMPRESSION:  Multilevel degenerative disease involving the lumbar spine  as noted as described above overall similar to 4/29/2021 and including  mild disc desiccation and loss of disc height at multiple levels. Mild  foraminal stenosis is present bilaterally as described in detail above.  There is no evidence of a focal herniation. Endplate degenerative  changes posterolaterally to the right at L4-L5 and a small annular tear  posterolaterally to the right at L4-L5 are appreciated which are new  versus 4/29/2021.              Objective     Past Medical History:   Past Medical History:   Diagnosis Date    Allergic 1980    Allergic rhinitis     CHRONIC, D/T POLLEN    Bilateral tinnitus     BPH (benign prostatic hyperplasia)     FOLLOWED BY DR. ROSE LANZA    CKD (chronic kidney disease)     STAGE II, FOLLOWED BY DR. MAX NOEL    Colon polyps     FOLLOWED BY DR. MARCUS BEGUM    DDD (degenerative disc disease), cervical     DDD (degenerative disc disease), lumbosacral     CHRONIC LUMBAR PAIN    Erectile dysfunction 2009    Headache 2012    Hyperlipidemia     MIXED    Hypothyroidism, acquired     Liver hemangioma 01/18/2016    FOLLOWED BY DR. MAX NOEL    Low back pain 1999    Nodular prostate without urinary obstruction     FOLLOWED BY DR. ROSE LANZA    Osteoarthritis     Sleep apnea     DOES NOT WEAR CPAP     Past  Surgical History:   Past Surgical History:   Procedure Laterality Date    CARDIAC CATHETERIZATION  2003    D/T CP AND ABNML LABS    COLONOSCOPY N/A 10/18/2011    ENTIRE COLON WNL, RESCOPE IN 5 YRS, DR.RAYMOND BELLO AT [facility]    COLONOSCOPY N/A 11/09/2016    3 TUBULAR ADENOMA POLYPS IN CECUM, 2 TUBULAR ADENOMA POLYPS IN ASCENDING, 7 MM TUBULAR ADENOMA POLYP IN HEPATIC FLEXURE, RESCOPE IN 3 YRS, DR. MARCUS BEGUM AT [facility]    COLONOSCOPY N/A 11/14/2019    3 TUBULAR ADENOMA POLYPS IN TRANSVERSE, RESCOPE IN 3 YRS, DR. MARCUS BEGUM AT [facility]    COLONOSCOPY N/A 11/16/2022    4 MM BENIGN POLYP IN SIGMOID, MULTIPLE SMALL AND LARGE DIVERTICULA IN SIGMOID, RESCOPE IN 5 YRS, DR. MARCUS BEGUM AT [facility]    ENDOSCOPY      ENDOSCOPY AND COLONOSCOPY N/A 09/01/2005    NO RECORDS    EYE SURGERY  2020    HAND SURGERY Right 1961    HERNIA REPAIR N/A 1978    EPIGASTRIC HERNIA REPAIR    KNEE ARTHROSCOPY Left 12/02/2020    Procedure: LEFT KNEE ARTHROSCOPY. PARTIAL MEDIAL MENISECTOMY;  Surgeon: Juan Smith MD;  Location: [facility] OR [facility] [place];  Service: Orthopedics;  Laterality: Left;    LUMBAR EPIDURAL INJECTION N/A 08/11/2016    DR. THOMAS CASEY AT [facility]    LUMBAR EPIDURAL INJECTION N/A 06/07/2011    DR. SULLY HERNANDEZ AT [facility]    LUMBAR EPIDURAL INJECTION N/A 01/11/2011    DR. BRENT DUNN AT [facility]    LUMBAR EPIDURAL INJECTION N/A 10/07/2010    DR. JAY CHAMORRO AT [facility]    LUMBAR EPIDURAL INJECTION N/A 04/05/2010    DR. FREDDY MEJIA AT [facility]    LUMBAR EPIDURAL INJECTION N/A 03/24/2010    DR. FREDDY MEJIA AT [facility]    LUMBAR EPIDURAL INJECTION N/A 08/26/2009    DR. JAY CHAMORRO AT [facility]    LUMBAR EPIDURAL INJECTION N/A 08/19/2009    DR. BRIAN MELVIN AT [facility]    LUMBAR EPIDURAL INJECTION N/A 06/28/2007    DR. JAY CHAMORRO AT [facility]    LUMBAR EPIDURAL INJECTION N/A 07/05/2007    DR. AGUSTIN GONZALES AT [facility]    LUMBAR EPIDURAL INJECTION N/A 07/12/2007    DR. KENDRA CHANG AT [facility]    LUMBAR EPIDURAL INJECTION N/A 06/20/2006    DR. LÓPEZ DACOTSA AT [facility]    LUMBAR EPIDURAL INJECTION N/A  2006    DR. KENDRA CHANG AT [facility]    PROSTATE BIOPSY      X2    VASECTOMY N/A      Family History:   Family History   Problem Relation Age of Onset    Heart failure Mother     Kidney disease Mother     Heart disease Mother     Arthritis Mother     Cancer Father         BRAIN    Brain cancer Father     Alcohol abuse Father     Colon cancer Paternal Uncle     Cancer Paternal Uncle         Uncle    Malig Hyperthermia Neg Hx      Social History:   Social History     Socioeconomic History    Marital status:     Number of children: 2    Years of education: 12TH GRADE   Tobacco Use    Smoking status: Former     Current packs/day: 0.00     Average packs/day: 1 pack/day for 30.0 years (30.0 ttl pk-yrs)     Types: Cigarettes     Start date: 1967     Quit date:      Years since quittin.5    Smokeless tobacco: Never   Vaping Use    Vaping status: Never Used   Substance and Sexual Activity    Alcohol use: Not Currently     Comment: RARELY    Drug use: No    Sexual activity: Yes     Partners: Female       Vital Signs Range for the last 24 hours  Temperature: Temp:  [36.3 °C (97.3 °F)] 36.3 °C (97.3 °F)   Temp Source: Temp src: Tympanic   BP: BP: (129)/(75) 129/75   Pulse: Heart Rate:  [65] 65   Respirations: Resp:  [18] 18   SPO2: SpO2:  [95 %] 95 %   O2 Amount (l/min):     O2 Devices Device (Oxygen Therapy): room air   Weight:           --------------------------------------------------------------------------------    Current Outpatient Medications   Medication Sig Dispense Refill    atorvastatin (LIPITOR) 40 MG tablet Take 1 tablet by mouth Daily. 90 tablet 1    Cholecalciferol (Vitamin D3) 50 MCG (2000 UT) tablet Take 1 tablet by mouth Daily With Dinner.      diclofenac (VOLTAREN) 75 MG EC tablet Take 1 tablet by mouth 2 (Two) Times a Day. 30 tablet 2    fluticasone (FLONASE) 50 MCG/ACT nasal spray Administer 2 sprays into the nostril(s) as directed by provider Daily. 48 g 3     HYDROcodone-acetaminophen (Norco) 7.5-325 MG per tablet Take 1 tablet by mouth Daily As Needed for Moderate Pain. 40 tablet 0    levothyroxine (Synthroid) 100 MCG tablet Take 1 tablet by mouth Daily. 90 tablet 1    ondansetron ODT (ZOFRAN-ODT) 4 MG disintegrating tablet Place 1 tablet on the tongue Every 8 (Eight) Hours As Needed for Nausea or Vomiting. 30 tablet 5    Zinc 50 MG capsule Take 1 tablet by mouth Daily With Dinner.      alfuzosin (UROXATRAL) 10 MG 24 hr tablet Take 1 tablet by mouth every night at bedtime. (Patient not taking: Reported on 7/7/2025)      ascorbic acid (VITAMIN C) 500 MG tablet Take 1 tablet by mouth Daily With Dinner.      valACYclovir (VALTREX) 1000 MG tablet TAKE 2 TABLETS EVERY TWELVE HOURS FOR 2 DOSES AS NEEDED (Patient taking differently: As Needed. TAKE 2 TABLETS EVERY TWELVE HOURS FOR 2 DOSES AS NEEDED) 40 tablet 3     Current Facility-Administered Medications   Medication Dose Route Frequency Provider Last Rate Last Admin    fentaNYL citrate (PF) (SUBLIMAZE) injection 50 mcg  50 mcg Intravenous Once RenderHarjinder MD        iopamidol (ISOVUE-M 200) injection 41%  10 mL Epidural Once in imaging Harjinder Xiao MD        lidocaine (XYLOCAINE) 1 % injection 1 mL  1 mL Intradermal Once Harjinder Xiao MD        methylPREDNISolone acetate (DEPO-medrol) injection 80 mg  80 mg Epidural Once RenderHarjinder MD        midazolam (VERSED) injection 1 mg  1 mg Intravenous Once PRN Harjinder Xiao MD           --------------------------------------------------------------------------------  Assessment & Plan      Anesthesia Evaluation           Pain impairs ability to perform ADLs: Ambulation and Exercise/Activity  Modalities previously tried to control pain with limited effectiveness within the last 4-6 weeks: Rest, OTC medications and Physical therapy     Airway   Mallampati: II  TM distance: >3 FB  Neck ROM: full  Dental      Pulmonary    (+) ,sleep apnea  (-) wheezes   "Cardiovascular     Rhythm: regular      PE comment: Posterior tibial pulses are palpable bilaterally    Neuro/Psych  (+) headaches,   right straight leg raise test  GI/Hepatic/Renal/Endo    (+) renal disease- CRI, thyroid problem     ROS Comment: GFR is 67    Musculoskeletal         PE comment: Station and gait is narrow-based and steady.  There is no focal motor weakness noted in his lower extremities  Abdominal    Substance History      OB/GYN          Other                 Diagnosis and Plan    Treatment Plan  ASA 3   Patient has had previous injection/procedure with % improvement.   Procedures: Lumbar Epidural Steroid Injection(LESI), With fluoroscopy,      Anesthetic plan and risks discussed with patient.      Discussed with patient risk and benefits of DARIANA including but not limited to : Bleeding, infection, PDPH, inadvertant spinal anesthetic, worsening pain, hyperglycemia, hypertension, CHF, nerve damage, steroid \"toxicity\" and AVN of hips.  Pt agrees to proceed.  Diagnosis     * Connective tissue and disc stenosis of intervertebral foramina of lumbar region [M99.73]     * Lumbar radiculopathy [M54.16]                      "

## 2025-08-21 ENCOUNTER — TELEPHONE (OUTPATIENT)
Dept: FAMILY MEDICINE CLINIC | Facility: CLINIC | Age: 75
End: 2025-08-21
Payer: MEDICARE

## 2025-08-21 DIAGNOSIS — E03.9 HYPOTHYROIDISM, ACQUIRED: ICD-10-CM

## 2025-08-21 DIAGNOSIS — E78.2 MIXED HYPERLIPIDEMIA: Primary | ICD-10-CM

## 2025-08-30 LAB
ALBUMIN SERPL-MCNC: 4.3 G/DL (ref 3.5–5.2)
ALBUMIN/GLOB SERPL: 1.9 G/DL
ALP SERPL-CCNC: 82 U/L (ref 39–117)
ALT SERPL-CCNC: 21 U/L (ref 1–41)
AST SERPL-CCNC: 25 U/L (ref 1–40)
BASOPHILS # BLD AUTO: 0.07 10*3/MM3 (ref 0–0.2)
BASOPHILS NFR BLD AUTO: 1 % (ref 0–1.5)
BILIRUB SERPL-MCNC: 0.4 MG/DL (ref 0–1.2)
BUN SERPL-MCNC: 12 MG/DL (ref 8–23)
BUN/CREAT SERPL: 10.8 (ref 7–25)
CALCIUM SERPL-MCNC: 9.5 MG/DL (ref 8.6–10.5)
CHLORIDE SERPL-SCNC: 105 MMOL/L (ref 98–107)
CHOLEST SERPL-MCNC: 143 MG/DL (ref 0–200)
CO2 SERPL-SCNC: 23.3 MMOL/L (ref 22–29)
CREAT SERPL-MCNC: 1.11 MG/DL (ref 0.76–1.27)
EGFRCR SERPLBLD CKD-EPI 2021: 68.8 ML/MIN/1.73
EOSINOPHIL # BLD AUTO: 0.22 10*3/MM3 (ref 0–0.4)
EOSINOPHIL NFR BLD AUTO: 3.1 % (ref 0.3–6.2)
ERYTHROCYTE [DISTWIDTH] IN BLOOD BY AUTOMATED COUNT: 11.7 % (ref 12.3–15.4)
GLOBULIN SER CALC-MCNC: 2.3 GM/DL
GLUCOSE SERPL-MCNC: 87 MG/DL (ref 65–99)
HCT VFR BLD AUTO: 42.1 % (ref 37.5–51)
HDLC SERPL-MCNC: 62 MG/DL (ref 40–60)
HGB BLD-MCNC: 14.2 G/DL (ref 13–17.7)
IMM GRANULOCYTES # BLD AUTO: 0.02 10*3/MM3 (ref 0–0.05)
IMM GRANULOCYTES NFR BLD AUTO: 0.3 % (ref 0–0.5)
LDLC SERPL CALC-MCNC: 68 MG/DL (ref 0–100)
LYMPHOCYTES # BLD AUTO: 1.9 10*3/MM3 (ref 0.7–3.1)
LYMPHOCYTES NFR BLD AUTO: 26.7 % (ref 19.6–45.3)
MCH RBC QN AUTO: 31.8 PG (ref 26.6–33)
MCHC RBC AUTO-ENTMCNC: 33.7 G/DL (ref 31.5–35.7)
MCV RBC AUTO: 94.2 FL (ref 79–97)
MONOCYTES # BLD AUTO: 0.6 10*3/MM3 (ref 0.1–0.9)
MONOCYTES NFR BLD AUTO: 8.4 % (ref 5–12)
NEUTROPHILS # BLD AUTO: 4.31 10*3/MM3 (ref 1.7–7)
NEUTROPHILS NFR BLD AUTO: 60.5 % (ref 42.7–76)
NRBC BLD AUTO-RTO: 0 /100 WBC (ref 0–0.2)
PLATELET # BLD AUTO: 268 10*3/MM3 (ref 140–450)
POTASSIUM SERPL-SCNC: 4.1 MMOL/L (ref 3.5–5.2)
PROT SERPL-MCNC: 6.6 G/DL (ref 6–8.5)
RBC # BLD AUTO: 4.47 10*6/MM3 (ref 4.14–5.8)
SODIUM SERPL-SCNC: 142 MMOL/L (ref 136–145)
T4 FREE SERPL-MCNC: 1.45 NG/DL (ref 0.92–1.68)
TRIGL SERPL-MCNC: 60 MG/DL (ref 0–150)
TSH SERPL DL<=0.005 MIU/L-ACNC: 2.66 UIU/ML (ref 0.27–4.2)
VLDLC SERPL CALC-MCNC: 13 MG/DL (ref 5–40)
WBC # BLD AUTO: 7.12 10*3/MM3 (ref 3.4–10.8)

## (undated) DEVICE — THE TORRENT IRRIGATION SCOPE CONNECTOR IS USED WITH THE TORRENT IRRIGATION TUBING TO PROVIDE IRRIGATION FLUIDS SUCH AS STERILE WATER DURING GASTROINTESTINAL ENDOSCOPIC PROCEDURES WHEN USED IN CONJUNCTION WITH AN IRRIGATION PUMP (OR ELECTROSURGICAL UNIT).: Brand: TORRENT

## (undated) DEVICE — SKIN PREP TRAY W/CHG: Brand: MEDLINE INDUSTRIES, INC.

## (undated) DEVICE — SENSR O2 OXIMAX FNGR A/ 18IN NONSTR

## (undated) DEVICE — CANN O2 ETCO2 FITS ALL CONN CO2 SMPL A/ 7IN DISP LF

## (undated) DEVICE — SNAR POLYP SENSATION STDOVL 27 240 BX40

## (undated) DEVICE — THE SINGLE USE ETRAP – POLYP TRAP IS USED FOR SUCTION RETRIEVAL OF ENDOSCOPICALLY REMOVED POLYPS.: Brand: ETRAP

## (undated) DEVICE — ADAPT CLN BIOGUARD AIR/H2O DISP

## (undated) DEVICE — DRSNG GZ PETROLTM XEROFORM CURAD 1X8IN STRL

## (undated) DEVICE — PAD GRND REM POLYHESIVE A/ DISP

## (undated) DEVICE — BNDG ESMARK STRL 6INX12FT LF

## (undated) DEVICE — GLV SURG BIOGEL LTX PF 8 1/2

## (undated) DEVICE — TUBING, SUCTION, 1/4" X 10', STRAIGHT: Brand: MEDLINE

## (undated) DEVICE — DISPOSABLE TOURNIQUET CUFF SINGLE BLADDER, SINGLE PORT AND QUICK CONNECT CONNECTOR: Brand: COLOR CUFF

## (undated) DEVICE — SUT ETHLN 4/0 PS2 PLSTC 1667G

## (undated) DEVICE — CANN NASL CO2 TRULINK W/O2 A/

## (undated) DEVICE — TBG PUMP ARTHSCP MAIN AR6400 16FT

## (undated) DEVICE — PK ARTHSCP 40

## (undated) DEVICE — GLV SURG BIOGEL LTX PF 8

## (undated) DEVICE — PAD,ABDOMINAL,8"X10",ST,LF: Brand: MEDLINE

## (undated) DEVICE — KT VLV BIOGUARD SXN BIOP AIR/H20 CONN 4PC DISP

## (undated) DEVICE — SINGLE-USE BIOPSY FORCEPS: Brand: RADIAL JAW 4

## (undated) DEVICE — KT ORCA ORCAPOD DISP STRL

## (undated) DEVICE — BLD SHV DBL/CUT COOLCUT 4MM 13CM

## (undated) DEVICE — LN SMPL CO2 SHTRM SD STREAM W/M LUER